# Patient Record
Sex: MALE | Race: WHITE | NOT HISPANIC OR LATINO | Employment: FULL TIME | URBAN - METROPOLITAN AREA
[De-identification: names, ages, dates, MRNs, and addresses within clinical notes are randomized per-mention and may not be internally consistent; named-entity substitution may affect disease eponyms.]

---

## 2018-01-12 NOTE — PROGRESS NOTES
Assessment    1  Well adult exam (V70 0) (Z00 00)   2  Current every day smoker (305 1) (F17 200)   3  Ingrowing nail with infection (703 0) (L60 0)   4  Hyperlipidemia (272 4) (E78 5)   5  Insect bites, initial encounter (919 4,E906 4) (W57 XXXA)   6  Obesity (278 00) (E66 9)    Plan  Abnormal glucose, Hyperlipidemia    · (1) HEMOGLOBIN A1C; Status:Active; Requested for:70Qfi6931;   Hyperlipidemia    · (1) CBC/PLT/DIFF; Status:Active; Requested for:12Dwy0581;    · (1) COMPREHENSIVE METABOLIC PANEL; Status:Active; Requested for:30Atr0984;    · (1) LIPID PANEL, FASTING; Status:Active; Requested for:50Kut9885;    · (1) TSH; Status:Active; Requested for:85Yjs9591;   Ingrowing nail with infection    · Ciclopirox Treatment 8 % External Kit; USE AS DIRECTED  Insect bites, initial encounter    · HydrOXYzine HCl - 25 MG Oral Tablet; 1-2 AT HS FOR ITCHING AND SLEEP  Insomnia    · Zolpidem Tartrate ER 12 5 MG Oral Tablet Extended Release; take 1 tablet at  bedtime for sleep  Need for prophylactic vaccination and inoculation against influenza    · Fluzone Quadrivalent 0 5 ML Intramuscular Suspension Prefilled Syringe  Obesity    · Follow-up visit in 6 weeks Evaluation and Treatment  Follow-up  Status: Hold For -  Scheduling  Requested for: 77QLL9309  SocHx: Tobacco use    · Chantix Starting Month Ivan 0 5 MG X 11 & 1 MG X 42 Oral Tablet; TAKE AS  DIRECTED PER PACKAGE INSTRUCTIONS  Well adult exam    · We recommend you quit smoking  Time spent counseling today was greater than 3  minutes ; Status:Complete;   Done: 91EQD3033    Discussion/Summary  Impression: health maintenance visit, OBESE MALE  Currently, he eats a poor diet and has an inadequate exercise regimen  Prostate cancer screening: PSA is not indicated  Testicular cancer screening: monthly self testicular exam was advised  Colorectal cancer screening: colorectal cancer screening is not indicated  Screening lab work includes glucose and lipid profile   The immunizations are up to date  He was advised to be evaluated by Bridger Sharma  Advice and education were given regarding aerobic exercise, weight loss, cardiovascular risk reduction and tobacco cessation  Patient discussion: discussed with the patient  ENCOURGED TO LOSE WEIGHT AND QUIT SMOKING- REFILL CHANTIX- FOLLOW UP IN 6 WEEKS FOR PROGRESS  HE HAS USEDC CHANTIX 2X AND WELLBUTRIN - NO HELP  DENTAL LOSS- FOLLOW UP WITH DENTIST  MANSI COURTNEY OF CAD AND DM- CHECK LABS AND HGA1C  ADD AMBIEN CR TO HS TO SLEEP- MAY NEED SLEEP EVALUATION; ADD HYDROXIZINE TO HELP W SLEEP AND ITCHING FROM BUG BITES ON LEGS  NAILS- ADD PENLAC TO AREA BID  FLU VACCINE TODAY  Chief Complaint  patient here for annual exam; he feels well; he wants to quit smoking- has used chantix- helped the first time but the second time it did not;   he smokes 1 5 ppd  he has sleep issues; History of Present Illness  HM, Adult Male: The patient is being seen for a health maintenance evaluation  The last health maintenance visit was 12 PLUS month(s) ago  Social History: Work status: working full time  The patient SMOKES 1 5 PPD  He is ready to quit using tobacco  He reports rare alcohol use  He has never used illicit drugs  General Health: The patient's health since the last visit is described as good  He denies vision problems  He denies hearing loss  Immunizations status: up to date  Lifestyle:  He consumes a diverse and healthy diet  He has weight concerns  He does not exercise regularly  He uses tobacco  He denies drug use  Screening:       Sleep Disorders (Brief): The patient is being seen for worsening symptoms of a sleep disorder   Symptoms:  excessive daytime sleepiness, difficulty falling asleep and unrefreshing sleep, but no witnessed sleep apnea, no witnessed sleep gasping, no nocturnal choking, no snoring, no early awakening, no sleepiness when sedentary, no impaired concentration, no memory problems, no irritability and no restless legs  The patient is currently experiencing symptoms  No associated symptoms are reported  Smoking Cessation [facility]: Current treatment includes: varenicline (Chantix)  By report, there is good compliance with treatment  Nail Disease (Brief): The patient is being seen for worsening symptoms of nail disease  Symptoms:  nail discoloration, thickened nails, splitting nails and grooves in the nails, but no brittle nails, no soft nails, no loosened nails, no nail shedding, no bleeding under the nails, no swelling under the nails, no drainage under the nails and no tenderness under the nails  The patient is currently experiencing symptoms  Associated symptoms:  adjacent skin irritation and adjacent skin erythema, but no digital pain, no digital edema, no digital erythema, no digital tenderness, no adjacent skin pruritus and no adjacent skin edema  HPI: PT HAS SLEEP ISSUES- WAS ON 20 MG OF AMBIEN- IN THE PAST- 1 TAB DID NOT WORK WELL; HE WAKES UP THRU THE NIGHT; HE FAILED TRAZODONE;   HE WANTS TO QUIT SMOKING  HE HAS A LESIO NON RIGHT MIDDLE FINGER - A BUBBLE POPS UP AND GETS INFECTED- - HE HAS A HX OF NAIL FUNGUS; HE USED BRUSH ON APPLICATION ON HIS FEET- THEY ARE DOING WELL; HE HAS ITCHING IN GROIN  HE HAS STRONG FAMLY HX OF DM  HE IS DUE FOR LABS       Review of Systems    Constitutional: No fever or chills, feels well, no tiredness, no recent weight gain or weight loss, no fever, not feeling poorly, no chills and not feeling tired  Eyes: No complaints of eye pain, no red eyes, no discharge from eyes, no itchy eyes, no eye pain, no eyesight problems, eyes not red and no purulent discharge from the eyes  ENT: no complaints of earache, no hearing loss, no nosebleeds, no nasal discharge, no sore throat, no hoarseness, no earache, no nosebleeds, no hearing loss and no nasal discharge     Cardiovascular: No complaints of slow heart rate, no fast heart rate, no chest pain, no palpitations, no leg claudication, no lower extremity, the heart rate was not slow, no chest pain, the heart rate was not fast and no palpitations  Respiratory: cough, but No complaints of shortness of breath, no wheezing, no cough, no SOB on exertion, no orthopnea or PND, as noted in HPI, no shortness of breath, no orthopnea, no wheezing, no shortness of breath during exertion and no PND  Gastrointestinal: No complaints of abdominal pain, no constipation, no nausea or vomiting, no diarrhea or bloody stools, no abdominal pain, no nausea, no constipation and no diarrhea  Genitourinary: No complaints of dysuria, no incontinence, no hesitancy, no nocturia, no genital lesion, no testicular pain, no dysuria, no urinary hesitancy, no incontinence and no nocturia  Musculoskeletal: arthralgias and NAIL DEFORMITY RIGHT MIDDLE FINGER; BITES ON LEGS, but as noted in HPI, no joint swelling and no joint stiffness  Integumentary: No complaints of skin rash or skin lesions, no itching, no skin wound, no dry skin  Neurological: No compliants of headache, no confusion, no convulsions, no numbness or tingling, no dizziness or fainting, no limb weakness, no difficulty walking  Psychiatric: Is not suicidal, no sleep disturbances, no anxiety or depression, no change in personality, no emotional problems, no anxiety and no depression  Endocrine: No complaints of proptosis, no hot flashes, no muscle weakness, no erectile dysfunction, no deepening of the voice, no feelings of weakness, no proptosis, no hot flashes and no erectile dysfunction  Hematologic/Lymphatic: No complaints of swollen glands, no swollen glands in the neck, does not bleed easily, no easy bruising, no tendency for easy bleeding and no tendency for easy bruising  ROS reviewed  Active Problems    1  Abscess, dental (522 5) (K04 7)   2  Hyperlipidemia (272 4) (E78 5)   3  Insomnia (780 52) (G47 00)   4  Lyme disease (088 81) (A69 20)   5   Need for prophylactic vaccination and inoculation against influenza (V04 81) (Z23)   6  Obesity (278 00) (E66 9)   7  Onychomycosis (110 1) (B35 1)   8  Pre-op exam (V72 84) (Z01 818)   9  Shingles rash (053 9) (B02 9)   10  Shoulder joint pain, unspecified laterality   11  Tobacco use (305 1) (Z72 0)   12  Well adult exam (V70 0) (Z00 00)    Past Medical History    · History of Cellulitis of left arm (682 3) (L03 114)   · History of Complete tear of rotator cuff, unspecified laterality (727 61) (M75 120)   · History of Foot Pain (Soft Tissue) (729 5)   · History of onychomycosis (V12 09) (Z86 19)   · History of Rotator cuff tendinitis, unspecified laterality (726 10) (M75 80)   · History of Visit for pre-operative examination (V72 84) (M80 096)    Surgical History    · History of Eye Surgery   · History of Foot Repair   · History of Shoulder Surgery    Family History  Father    · Family history of Diabetes Mellitus (V18 0)    Social History    · Current every day smoker (305 1) (F17 200)   · Tobacco use (305 1) (Z72 0)    Current Meds   1  No Reported Medications Recorded    Allergies    1  No Known Drug Allergies    Vitals   Recorded: 33KNW6483 32:73JF   Systolic 078   Diastolic 80   Heart Rate 84   Respiration 16   Temperature 96 F   Height 5 ft 11 in   Weight 278 lb 8 0 oz   BMI Calculated 38 84   BSA Calculated 2 42     Physical Exam    Constitutional   General appearance: No acute distress, well appearing and well nourished  WDWN OBESE MALE IN NAD;    Eyes   Conjunctiva and lids: No erythema, swelling or discharge  EOMI PERRLA  Pupils and irises: Equal, round, reactive to light  Ears, Nose, Mouth, and Throat   External inspection of ears and nose: Normal     Otoscopic examination: Tympanic membranes translucent with normal light reflex  Canals patent without erythema  TM CLEAR  Hearing: Normal     Nasal mucosa, septum, and turbinates: Normal without edema or erythema  Lips, teeth, and gums: Normal, good dentition  Oropharynx: Normal with no erythema, edema, exudate or lesions  POOR DENTITION; CROWDED OROPHARYN X    Neck   Neck: Supple, symmetric, trachea midline, no masses  Thyroid: Normal, no thyromegaly  NO NODULES  Pulmonary   Respiratory effort: No increased work of breathing or signs of respiratory distress  Percussion of chest: Normal     Palpation of chest: Normal     Auscultation of lungs: Clear to auscultation  Auscultation of the lungs revealed no expiratory wheezing, normal expiratory time and no inspiratory wheezing  no rales or crackles were heard bilaterally  no rhonchi  no friction rub  no wheezing  no diminished breath sounds  no bronchial breath sounds  CLEAR WITH DECREASED BS AT BASES NO RALES NO WHEEDZE  Cardiovascular   Palpation of heart: Normal PMI, no thrills  REGULAR  Auscultation of heart: Normal rate and rhythm, normal S1 and S2, no murmurs  The heart rate was normal  The rhythm was regular  Heart sounds: normal S1, normal S2, no S3 and no S4  no murmurs were heard  Carotid pulses: 2+ bilaterally  NO BRUITS NOTED  Abdominal aorta: Normal     Pedal pulses: 2+ bilaterally  PEDAL PULSES INTACT - BONY DEFORMITY LEFT GREAT TOE  Examination of extremities for edema and/or varicosities: Normal     Abdomen   Abdomen: Non-tender, no masses  OBESE NTND NO MASSES NO BRUITS  Liver and spleen: No hepatomegaly or splenomegaly  Lymphatic   Palpation of lymph nodes in neck: No lymphadenopathy  RIGHT MIDDLE DIGIT WITH SCALING NAD IRRITATION AROUND NAIL BED SOME RIDGES NOTED IN NAIL  Musculoskeletal   Gait and station: Normal     Inspection/palpation of digits and nails: Normal without clubbing or cyanosis  Inspection/palpation of joints, bones, and muscles: Normal     Range of motion: Normal     Stability: Normal     Muscle strength/tone: Normal     Skin   Skin and subcutaneous tissue: Normal without rashes or lesions      Palpation of skin and subcutaneous tissue: Abnormal  MULTIPLE BITES ON B/ L LOWER EXTREMITIES  Neurologic   Cranial nerves: Cranial nerves 2-12 intact  Reflexes: 2+ and symmetric  Sensation: No sensory loss  Psychiatric   Judgment and insight: Normal     Orientation to person, place and time: Normal     Recent and remote memory: Intact  Mood and affect: Normal        Results/Data  PHQ-2 Adult Depression Screening 20Sep2016 09:11AM User, [facility]     Test Name Result Flag Reference   PHQ-2 Adult Depression Score 0     Over the last two weeks, how often have you been bothered by any of the following problems? Little interest or pleasure in doing things: Not at all - 0  Feeling down, depressed, or hopeless: Not at all - 0   PHQ-2 Adult Depression Screening Negative         Signatures   Electronically signed by :  Landon Terrell DO; Sep 20 2016  4:03PM EST                       (Author)

## 2018-01-16 NOTE — RESULT NOTES
Message   Farrel Row- Your triglycerides are very high- please add fish oil daily (available over the counter);  quitting smoking, exercising and limiting the fats in your diet will also help;  please repeat labs in 4 months to make sure they improve; Your HgA1c is also 6 3- putting youat risk for diabetes- limiting fats and carbohydrates in your diet will help this as will exercise and weight loss  Follow up after labs are drawn  Verified Results  (1) CBC/PLT/DIFF [id number] 08:22AM [facility] Order Number: [id number]     Test Name Result Flag Reference   WBC COUNT 8 58 Thousand/uL  4 31-10 16   RBC COUNT 5 14 Million/uL  3 88-5 62   HEMOGLOBIN 15 7 g/dL  12 0-17 0   HEMATOCRIT 45 0 %  36 5-49 3   MCV 88 fL  82-98   MCH 30 5 pg  26 8-34 3   MCHC 34 9 g/dL  31 4-37 4   RDW 13 4 %  11 6-15 1   MPV 11 4 fL  8 9-12 7   PLATELET COUNT 980 Thousands/uL  149-390   nRBC AUTOMATED 0 /100 WBCs     NEUTROPHILS RELATIVE PERCENT 58 %  43-75   LYMPHOCYTES RELATIVE PERCENT 27 %  14-44   MONOCYTES RELATIVE PERCENT 9 %  4-12   EOSINOPHILS RELATIVE PERCENT 5 %  0-6   BASOPHILS RELATIVE PERCENT 1 %  0-1   NEUTROPHILS ABSOLUTE COUNT 4 98 Thousands/?L  1 85-7 62   LYMPHOCYTES ABSOLUTE COUNT 2 33 Thousands/?L  0 60-4 47   MONOCYTES ABSOLUTE COUNT 0 81 Thousand/?L  0 17-1 22   EOSINOPHILS ABSOLUTE COUNT 0 39 Thousand/?L  0 00-0 61   BASOPHILS ABSOLUTE COUNT 0 06 Thousands/?L  0 00-0 10   - Patient Instructions: This bloodwork is non-fasting  Please drink two glasses of water morning of bloodwork  - Patient Instructions: This bloodwork is non-fasting  Please drink two glasses of water morning of bloodwork       (1) COMPREHENSIVE METABOLIC PANEL [id number] 92:59AC Kailyn Goncalves Order Number: [id number]     Test Name Result Flag Reference   GLUCOSE,RANDM 112 mg/dL     If the patient is fasting, the ADA then defines impaired fasting glucose as > 100 mg/dL and diabetes as > or equal to 123 mg/dL  SODIUM 132 mmol/L L 136-145   POTASSIUM 4 1 mmol/L  3 5-5 3   CHLORIDE 100 mmol/L  100-108   CARBON DIOXIDE 25 mmol/L  21-32   ANION GAP (CALC) 7 mmol/L  4-13   BLOOD UREA NITROGEN 11 mg/dL  5-25   CREATININE 1 02 mg/dL  0 60-1 30   Standardized to IDMS reference method   CALCIUM 9 0 mg/dL  8 3-10 1   BILI, TOTAL 0 65 mg/dL  0 20-1 00   ALK PHOSPHATAS 58 U/L     ALT (SGPT) 41 U/L  12-78   AST(SGOT) 16 U/L  5-45   ALBUMIN 3 6 g/dL  3 5-5 0   TOTAL PROTEIN 7 7 g/dL  6 4-8 2   eGFR Non-African American      >60 0 ml/min/1 73sq m   - Patient Instructions: This is a fasting blood test  Water,black tea or black  coffee only after 9:00pm the night before test Drink 2 glasses of water the morning of test - Patient Instructions: This bloodwork is non-fasting  Please drink two glasses of   water morning of bloodwork  National Kidney Disease Education Program recommendations are as follows:  GFR calculation is accurate only with a steady state creatinine  Chronic Kidney disease less than 60 ml/min/1 73 sq  meters  Kidney failure less than 15 ml/min/1 73 sq  meters  (1) LIPID PANEL, FASTING [id number] 08:22AM [facility] Order Number: [id number]     Test Name Result Flag Reference   CHOLESTEROL 156 mg/dL     HDL,DIRECT 18 mg/dL L 40-60   Specimen collection should occur prior to Metamizole administration due to the potential for falsely depressed results  LDL CHOLESTEROL CALCULATED 69 mg/dL  0-100   - Patient Instructions: This is a fasting blood test  Water,black tea or black  coffee only after 9:00pm the night before test   Drink 2 glasses of water the morning of test     - Patient Instructions: This is a fasting blood test  Water,black tea or black  coffee only after 9:00pm the night before test Drink 2 glasses of water the morning of test - Patient Instructions: This bloodwork is non-fasting  Please drink two glasses of   water morning of bloodwork  Triglyceride:         Normal              <150 mg/dl       Borderline High    150-199 mg/dl       High               200-499 mg/dl       Very High          >499 mg/dl  Cholesterol:         Desirable        <200 mg/dl      Borderline High  200-239 mg/dl      High             >239 mg/dl  HDL Cholesterol:        High    >59 mg/dL      Low     <41 mg/dL  LDL CALCULATED:    This screening LDL is a calculated result  It does not have the accuracy of the Direct Measured LDL in the monitoring of patients with hyperlipidemia and/or statin therapy  Direct Measure LDL (NIO613) must be ordered separately in these patients  TRIGLYCERIDES 344 mg/dL H <=150   Specimen collection should occur prior to N-Acetylcysteine or Metamizole administration due to the potential for falsely depressed results  (1) TSH 23Sep2016 08:22AM [facility] Number: [id number]     Test Name Result Flag Reference   TSH 1 320 uIU/mL  0 358-3 740   - Patient Instructions: This bloodwork is non-fasting  Please drink two glasses of water morning of bloodwork  - Patient Instructions: This is a fasting blood test  Water,black tea or black  coffee only after 9:00pm the night before test Drink 2 glasses of water the morning of test - Patient Instructions: This bloodwork is non-fasting  Please drink two glasses of   water morning of bloodwork  Patients undergoing fluorescein dye angiography may retain small amounts of fluorescein in the body for 48-72 hours post procedure  Samples containing fluorescein can produce falsely depressed TSH values  If the patient had this procedure,a specimen should be resubmitted post fluorescein clearance  (1) HEMOGLOBIN A1C 23Sep2016 08:22AM [facility] Order Number: [id number]     Test Name Result Flag Reference   HEMOGLOBIN A1C 6 3 %  4 2-6 3   EST  AVG   GLUCOSE 134 mg/dl         Plan  Hyperlipidemia    · (1) COMPREHENSIVE METABOLIC PANEL; Status:Active; Requested BTB:[id number];   Hyperlipidemia, Obesity    · (1) LIPID PANEL, FASTING; Status:Active;  Requested GQE:[id number];

## 2018-05-23 ENCOUNTER — APPOINTMENT (OUTPATIENT)
Dept: LAB | Facility: AMBULARY SURGERY CENTER | Age: 43
End: 2018-05-23
Payer: COMMERCIAL

## 2018-05-23 ENCOUNTER — TRANSCRIBE ORDERS (OUTPATIENT)
Dept: LAB | Facility: AMBULARY SURGERY CENTER | Age: 43
End: 2018-05-23

## 2018-05-23 DIAGNOSIS — D50.0 BLOOD LOSS ANEMIA: ICD-10-CM

## 2018-05-23 DIAGNOSIS — R73.01 IMPAIRED FASTING BLOOD SUGAR: ICD-10-CM

## 2018-05-23 DIAGNOSIS — E78.5 HYPERLIPIDEMIA, UNSPECIFIED HYPERLIPIDEMIA TYPE: Primary | ICD-10-CM

## 2018-05-23 DIAGNOSIS — R35.1 NYCTURIA: ICD-10-CM

## 2018-05-23 DIAGNOSIS — Z11.3 SCREENING FOR VENEREAL DISEASE: ICD-10-CM

## 2018-05-23 DIAGNOSIS — E78.5 HYPERLIPIDEMIA, UNSPECIFIED HYPERLIPIDEMIA TYPE: ICD-10-CM

## 2018-05-23 LAB
ALBUMIN SERPL BCP-MCNC: 3.7 G/DL (ref 3.5–5)
ALP SERPL-CCNC: 55 U/L (ref 46–116)
ALT SERPL W P-5'-P-CCNC: 30 U/L (ref 12–78)
ANION GAP SERPL CALCULATED.3IONS-SCNC: 6 MMOL/L (ref 4–13)
AST SERPL W P-5'-P-CCNC: 12 U/L (ref 5–45)
BASOPHILS # BLD AUTO: 0.07 THOUSANDS/ΜL (ref 0–0.1)
BASOPHILS NFR BLD AUTO: 1 % (ref 0–1)
BILIRUB SERPL-MCNC: 0.64 MG/DL (ref 0.2–1)
BUN SERPL-MCNC: 14 MG/DL (ref 5–25)
CALCIUM SERPL-MCNC: 8.9 MG/DL (ref 8.3–10.1)
CHLAMYDIA DNA CVX QL NAA+PROBE: NORMAL
CHLORIDE SERPL-SCNC: 102 MMOL/L (ref 100–108)
CHOLEST SERPL-MCNC: 175 MG/DL (ref 50–200)
CO2 SERPL-SCNC: 27 MMOL/L (ref 21–32)
CREAT SERPL-MCNC: 1.07 MG/DL (ref 0.6–1.3)
EOSINOPHIL # BLD AUTO: 0.35 THOUSAND/ΜL (ref 0–0.61)
EOSINOPHIL NFR BLD AUTO: 4 % (ref 0–6)
ERYTHROCYTE [DISTWIDTH] IN BLOOD BY AUTOMATED COUNT: 13.3 % (ref 11.6–15.1)
EST. AVERAGE GLUCOSE BLD GHB EST-MCNC: 128 MG/DL
GFR SERPL CREATININE-BSD FRML MDRD: 84 ML/MIN/1.73SQ M
GLUCOSE P FAST SERPL-MCNC: 101 MG/DL (ref 65–99)
HAV IGM SER QL: NORMAL
HBA1C MFR BLD: 6.1 % (ref 4.2–6.3)
HBV CORE IGM SER QL: NORMAL
HBV SURFACE AG SER QL: NORMAL
HCT VFR BLD AUTO: 49.1 % (ref 36.5–49.3)
HCV AB SER QL: NORMAL
HDLC SERPL-MCNC: 24 MG/DL (ref 40–60)
HGB BLD-MCNC: 17 G/DL (ref 12–17)
LDLC SERPL CALC-MCNC: 118 MG/DL (ref 0–100)
LYMPHOCYTES # BLD AUTO: 2.26 THOUSANDS/ΜL (ref 0.6–4.47)
LYMPHOCYTES NFR BLD AUTO: 28 % (ref 14–44)
MCH RBC QN AUTO: 31.5 PG (ref 26.8–34.3)
MCHC RBC AUTO-ENTMCNC: 34.6 G/DL (ref 31.4–37.4)
MCV RBC AUTO: 91 FL (ref 82–98)
MONOCYTES # BLD AUTO: 0.8 THOUSAND/ΜL (ref 0.17–1.22)
MONOCYTES NFR BLD AUTO: 10 % (ref 4–12)
N GONORRHOEA DNA GENITAL QL NAA+PROBE: NORMAL
NEUTROPHILS # BLD AUTO: 4.64 THOUSANDS/ΜL (ref 1.85–7.62)
NEUTS SEG NFR BLD AUTO: 57 % (ref 43–75)
NONHDLC SERPL-MCNC: 151 MG/DL
NRBC BLD AUTO-RTO: 0 /100 WBCS
PLATELET # BLD AUTO: 239 THOUSANDS/UL (ref 149–390)
PMV BLD AUTO: 10.3 FL (ref 8.9–12.7)
POTASSIUM SERPL-SCNC: 4.3 MMOL/L (ref 3.5–5.3)
PROT SERPL-MCNC: 8 G/DL (ref 6.4–8.2)
PSA SERPL-MCNC: 0.8 NG/ML (ref 0–4)
RBC # BLD AUTO: 5.39 MILLION/UL (ref 3.88–5.62)
SODIUM SERPL-SCNC: 135 MMOL/L (ref 136–145)
TRIGL SERPL-MCNC: 164 MG/DL
WBC # BLD AUTO: 8.14 THOUSAND/UL (ref 4.31–10.16)

## 2018-05-23 PROCEDURE — 80074 ACUTE HEPATITIS PANEL: CPT

## 2018-05-23 PROCEDURE — 36415 COLL VENOUS BLD VENIPUNCTURE: CPT

## 2018-05-23 PROCEDURE — 87389 HIV-1 AG W/HIV-1&-2 AB AG IA: CPT

## 2018-05-23 PROCEDURE — 83036 HEMOGLOBIN GLYCOSYLATED A1C: CPT

## 2018-05-23 PROCEDURE — G0103 PSA SCREENING: HCPCS

## 2018-05-23 PROCEDURE — 80061 LIPID PANEL: CPT

## 2018-05-23 PROCEDURE — 87591 N.GONORRHOEAE DNA AMP PROB: CPT

## 2018-05-23 PROCEDURE — 87491 CHLMYD TRACH DNA AMP PROBE: CPT

## 2018-05-23 PROCEDURE — 85025 COMPLETE CBC W/AUTO DIFF WBC: CPT

## 2018-05-23 PROCEDURE — 80053 COMPREHEN METABOLIC PANEL: CPT

## 2018-05-25 LAB — HIV 1+2 AB+HIV1 P24 AG SERPL QL IA: NORMAL

## 2018-06-04 ENCOUNTER — OFFICE VISIT (OUTPATIENT)
Dept: OBGYN CLINIC | Facility: CLINIC | Age: 43
End: 2018-06-04
Payer: COMMERCIAL

## 2018-06-04 ENCOUNTER — TRANSCRIBE ORDERS (OUTPATIENT)
Dept: ADMINISTRATIVE | Facility: HOSPITAL | Age: 43
End: 2018-06-04

## 2018-06-04 VITALS
HEART RATE: 60 BPM | WEIGHT: 257 LBS | HEIGHT: 72 IN | BODY MASS INDEX: 34.81 KG/M2 | DIASTOLIC BLOOD PRESSURE: 78 MMHG | SYSTOLIC BLOOD PRESSURE: 130 MMHG

## 2018-06-04 DIAGNOSIS — R22.32 MASS OF LEFT HAND: Primary | ICD-10-CM

## 2018-06-04 DIAGNOSIS — R22.32 MASS OF FINGER OF LEFT HAND: Primary | ICD-10-CM

## 2018-06-04 PROCEDURE — 99213 OFFICE O/P EST LOW 20 MIN: CPT | Performed by: ORTHOPAEDIC SURGERY

## 2018-06-04 RX ORDER — DIPHENHYDRAMINE HCL 25 MG
25 CAPSULE ORAL EVERY 6 HOURS PRN
COMMUNITY
End: 2019-08-01

## 2018-06-04 RX ORDER — TOBRAMYCIN AND DEXAMETHASONE 3; 1 MG/ML; MG/ML
1 SUSPENSION/ DROPS OPHTHALMIC
COMMUNITY
End: 2019-08-01

## 2018-06-04 RX ORDER — VARENICLINE TARTRATE 25 MG
KIT ORAL
COMMUNITY
Start: 2016-09-20 | End: 2019-08-01

## 2018-06-04 RX ORDER — LORATADINE 10 MG/1
10 TABLET ORAL DAILY
COMMUNITY
End: 2019-08-01

## 2018-06-04 NOTE — PROGRESS NOTES
Assessment:  1  Mass of left hand  US cyst aspiration left    MRI hand left w wo contrast     Patient Active Problem List   Diagnosis    Mass of left hand           Plan      Difficult to assess whether the mass in Tony's left hand is a ganglion cyst versus a neuroma as it is tense to palpation  He will obtain an and [facility] ultrasound with possible aspiration if fluid-filled with radiology  If the mass appears to be dense tissue on the ultrasound and then an MRI with contrast will be ordered to evaluate for neuroma  Possibility of hand surgery referral if Tony's findings are positive for neuroma was discussed with him  Patient was seen and examined and plan for related by Dr Becka Campuzano      Subjective:     Patient ID:    Chief Complaint:Tony Ferrer [age] y o  male      HPI    78-year-old male who is here for evaluation of a mass in his left hand  He noticed a "cyst" in the palm of his left hand approximately one year ago  Initially he did not think much of it but over the past 1-2 months has been giving him significant amount of discomfort with pressure  He denies locking or catching  He denies numbness or tingling  The following portions of the patient's history were reviewed and updated as appropriate: allergies, current medications, past family history, past social history, past surgical history and problem list         Social History     Social History    Marital status: Single     Spouse name: N/A    Number of children: N/A    Years of education: N/A     Occupational History    Not on file  Social History Main Topics    Smoking status: Current Every Day Smoker     Types: Cigarettes    Smokeless tobacco: Never Used    Alcohol use Yes    Drug use: Unknown    Sexual activity: Not on file     Other Topics Concern    Not on file     Social History Narrative    No narrative on file     History reviewed  No pertinent past medical history    Past Surgical History:   Procedure Laterality Date    EYE SURGERY      FOOT SURGERY      HAND SURGERY      SHOULDER SURGERY       Allergies   Allergen Reactions    Adhesive [Medical Tape]      No current outpatient prescriptions on file prior to visit  No current facility-administered medications on file prior to visit  Objective:    Review of Systems   Constitutional: Negative  HENT: Negative  Eyes: Positive for discharge and redness  Respiratory: Negative  Cardiovascular: Negative  Gastrointestinal: Negative  Endocrine: Negative  Genitourinary: Negative  Musculoskeletal: Negative  Skin: Negative  Allergic/Immunologic: Negative  Neurological: Negative  Hematological: Negative  Psychiatric/Behavioral: Negative  Left Knee Exam     Other   Effusion: no effusion present      Left Hand Exam     Tenderness   The patient is experiencing tenderness in the bautista area  Range of Motion   The patient has normal left wrist ROM  Muscle Strength   The patient has normal left wrist strength  Other   Erythema: absent  Scars: absent  Sensation: normal  Pulse: present    Comments:  Mobile dense mass to the palmar surface of left hand between ring and small finger  NO triggering or involvement with A1 pulley  Physical Exam   Constitutional: He is oriented to person, place, and time  He appears well-developed and well-nourished  HENT:   Head: Normocephalic and atraumatic  Eyes: EOM are normal  Left eye exhibits discharge  Neck: Normal range of motion  Cardiovascular: Regular rhythm  Pulmonary/Chest: Effort normal    Musculoskeletal:        Left knee: He exhibits no effusion  Neurological: He is alert and oriented to person, place, and time  Skin: Skin is warm and dry  Psychiatric: He has a normal mood and affect  His behavior is normal  Thought content normal    Nursing note and vitals reviewed              Portions of the record may have been created with voice recognition software   Occasional wrong word or "sound a like" substitutions may have occurred due to the inherent limitations of voice recognition software   Read the chart carefully and recognize, using context, where substitutions have occurred

## 2018-06-07 ENCOUNTER — HOSPITAL ENCOUNTER (OUTPATIENT)
Dept: RADIOLOGY | Facility: HOSPITAL | Age: 43
Discharge: HOME | End: 2018-06-07
Payer: COMMERCIAL

## 2018-06-07 ENCOUNTER — TRANSCRIBE ORDERS (OUTPATIENT)
Dept: RADIOLOGY | Facility: HOSPITAL | Age: 43
End: 2018-06-07

## 2018-06-07 DIAGNOSIS — R22.32 MASS OF FINGER OF LEFT HAND: ICD-10-CM

## 2018-06-07 PROCEDURE — 76881 US COMPL JOINT R-T W/IMG: CPT

## 2018-06-07 PROCEDURE — 20606 DRAIN/INJ JOINT/BURSA W/US: CPT

## 2018-06-07 RX ORDER — LIDOCAINE HYDROCHLORIDE 10 MG/ML
5 INJECTION, SOLUTION EPIDURAL; INFILTRATION; INTRACAUDAL; PERINEURAL ONCE
Status: COMPLETED | OUTPATIENT
Start: 2018-06-07 | End: 2018-06-07

## 2018-06-07 RX ADMIN — LIDOCAINE HYDROCHLORIDE 5 ML: 10 INJECTION, SOLUTION EPIDURAL; INFILTRATION; INTRACAUDAL; PERINEURAL at 08:35

## 2018-12-26 ENCOUNTER — TRANSCRIBE ORDERS (OUTPATIENT)
Dept: ADMINISTRATIVE | Facility: HOSPITAL | Age: 43
End: 2018-12-26

## 2018-12-26 DIAGNOSIS — G56.03 CARPAL TUNNEL SYNDROME, BILATERAL: Primary | ICD-10-CM

## 2019-04-02 ENCOUNTER — HOSPITAL ENCOUNTER (OUTPATIENT)
Dept: NEUROLOGY | Facility: AMBULATORY SURGERY CENTER | Age: 44
Discharge: HOME | End: 2019-04-02
Payer: COMMERCIAL

## 2019-04-02 DIAGNOSIS — G56.03 CARPAL TUNNEL SYNDROME, BILATERAL: ICD-10-CM

## 2019-04-02 PROCEDURE — 95911 NRV CNDJ TEST 9-10 STUDIES: CPT | Performed by: PSYCHIATRY & NEUROLOGY

## 2019-04-02 PROCEDURE — 95886 MUSC TEST DONE W/N TEST COMP: CPT | Performed by: PSYCHIATRY & NEUROLOGY

## 2019-04-08 ENCOUNTER — OFFICE VISIT (OUTPATIENT)
Dept: OBGYN CLINIC | Facility: CLINIC | Age: 44
End: 2019-04-08
Payer: COMMERCIAL

## 2019-04-08 VITALS
HEART RATE: 91 BPM | SYSTOLIC BLOOD PRESSURE: 133 MMHG | BODY MASS INDEX: 36.57 KG/M2 | WEIGHT: 270 LBS | DIASTOLIC BLOOD PRESSURE: 79 MMHG | HEIGHT: 72 IN

## 2019-04-08 DIAGNOSIS — G56.03 CARPAL TUNNEL SYNDROME, BILATERAL: Primary | ICD-10-CM

## 2019-04-08 PROCEDURE — 99213 OFFICE O/P EST LOW 20 MIN: CPT | Performed by: SURGERY

## 2019-05-22 ENCOUNTER — OFFICE VISIT (OUTPATIENT)
Dept: OBGYN CLINIC | Facility: HOSPITAL | Age: 44
End: 2019-05-22
Payer: COMMERCIAL

## 2019-05-22 VITALS
BODY MASS INDEX: 37.11 KG/M2 | SYSTOLIC BLOOD PRESSURE: 138 MMHG | HEIGHT: 72 IN | WEIGHT: 274 LBS | DIASTOLIC BLOOD PRESSURE: 82 MMHG | HEART RATE: 90 BPM

## 2019-05-22 DIAGNOSIS — G56.03 CARPAL TUNNEL SYNDROME, BILATERAL: Primary | ICD-10-CM

## 2019-05-22 PROCEDURE — 99213 OFFICE O/P EST LOW 20 MIN: CPT | Performed by: SURGERY

## 2019-05-22 PROCEDURE — 20526 THER INJECTION CARP TUNNEL: CPT | Performed by: SURGERY

## 2019-05-22 RX ORDER — LIDOCAINE HYDROCHLORIDE 20 MG/ML
1 INJECTION, SOLUTION EPIDURAL; INFILTRATION; INTRACAUDAL; PERINEURAL
Status: COMPLETED | OUTPATIENT
Start: 2019-05-22 | End: 2019-05-22

## 2019-05-22 RX ORDER — DEXAMETHASONE SODIUM PHOSPHATE 100 MG/10ML
40 INJECTION INTRAMUSCULAR; INTRAVENOUS
Status: COMPLETED | OUTPATIENT
Start: 2019-05-22 | End: 2019-05-22

## 2019-05-22 RX ADMIN — LIDOCAINE HYDROCHLORIDE 1 ML: 20 INJECTION, SOLUTION EPIDURAL; INFILTRATION; INTRACAUDAL; PERINEURAL at 09:07

## 2019-05-22 RX ADMIN — DEXAMETHASONE SODIUM PHOSPHATE 40 MG: 100 INJECTION INTRAMUSCULAR; INTRAVENOUS at 09:07

## 2019-07-18 ENCOUNTER — OFFICE VISIT (OUTPATIENT)
Dept: OBGYN CLINIC | Facility: CLINIC | Age: 44
End: 2019-07-18
Payer: COMMERCIAL

## 2019-07-18 VITALS
DIASTOLIC BLOOD PRESSURE: 84 MMHG | BODY MASS INDEX: 37.11 KG/M2 | HEIGHT: 72 IN | WEIGHT: 274 LBS | SYSTOLIC BLOOD PRESSURE: 137 MMHG | HEART RATE: 83 BPM

## 2019-07-18 DIAGNOSIS — G56.02 LEFT CARPAL TUNNEL SYNDROME: ICD-10-CM

## 2019-07-18 DIAGNOSIS — G56.03 CARPAL TUNNEL SYNDROME, BILATERAL: Primary | ICD-10-CM

## 2019-07-18 DIAGNOSIS — M25.512 LEFT SHOULDER PAIN, UNSPECIFIED CHRONICITY: ICD-10-CM

## 2019-07-18 PROCEDURE — 99214 OFFICE O/P EST MOD 30 MIN: CPT | Performed by: SURGERY

## 2019-07-18 RX ORDER — METHYLPREDNISOLONE 4 MG/1
TABLET ORAL
Qty: 1 EACH | Refills: 0 | Status: SHIPPED | OUTPATIENT
Start: 2019-07-18 | End: 2019-08-01

## 2019-07-18 NOTE — PROGRESS NOTES
ASSESSMENT/PLAN:      37 y o  male with bilateral carpal tunnel syndrome, left worse then right  Radha Ling has been wearing bilateral cock up wrist braces at night  He also underwent bilateral carpal tunnel CSI, without significant relief  Left open carpal tunnel release was discussed today  Radha Ling would like to proceed with surgical intervention  Left open carpal tunnel surgical consent was sign in the office today  Follow up 10-14 days afer surgery for suture removal  A EKG and BMP will be performed prior to surgery  He was also instructed to quite smoking  A medrol dosepak was prescribed today for left shoulder pain, no injury or trama  If symptoms persist he may see a shoulder specialist      The patient verbalized understanding of exam findings and treatment plan  We engaged in the shared decision-making process and treatment options were discussed at length with the patient  Surgical and conservative management discussed today along with risks and benefits  Diagnoses and all orders for this visit:    Carpal tunnel syndrome, bilateral      Open Carpal Tunnel Release: The anatomy and physiology of carpal tunnel syndrome was discussed with the patient today  Increase pressure localized under the transverse carpal ligament can cause pain, numbness, tingling, or dysesthesias within the median nerve distribution as well as feelings of fatigue, clumsiness, or awkwardness  These symptoms typically occur at night and worse in the morning upon waking  Eventually, untreated carpal tunnel syndrome can result in weakness and permanent loss of muscle within the thenar compartment of the hand  Treatment options were discussed with the patient  Conservative treatment includes nocturnal resting splints to keep the nerve in a neutral position, ergonomic changes within the work or home environment, activity modification, and tendon gliding exercises    Vitamin B6 one tablet daily over the counter may helpful to reduce symptoms  Steroid injections within the carpal canal can help a majority of patients, however this is often self-limited in a majority of patients  Surgical intervention to divide the transverse carpal ligament typically results in a long-lasting relief of the patient's complaints, with the recurrence rate of less than 1%  The patient has elected to undergo an open carpal tunnel release  The palmar incision technique was discussed in the office with the patient today  In the postoperative period, light activities are allowed immediately, driving is allowed when narcotic medication has stopped, and the bandages may be removed and incision may get wet after 2 days  Heavy activities (lifting more than approximately 10 pounds) will be allowed after follow up appointment in 1-2 weeks  While night symptoms (waking from sleep, pain, and discomfort in the hands) generally improves rapidly, the numbness and tingling as well as the strength will slowly improve over weeks to months depending on the chronicity and severity of the carpal tunnel syndrome  Pillar pain and scar discomfort were discussed with the patient which are self-limiting conditions  The risks of bleeding and infection from the surgery are less than 1%  Risk of recurrence is approximately 0 5%  The risks of nerve injury or nerve damage or damage to the blood vessels is approximately 1 in 1200  The patient has an understanding of the above mentioned discussion  The risks and benefits of the procedure were explained to the patient, which include, but are not limited to: Bleeding, infection, recurrence, pain, scar, damage to tendons, damage to nerves, and damage to blood vessels, failure to give desired results and complications related to anesthesia  These risks, along with alternative conservative treatment options, and postoperative protocols were voiced back and understood by the patient  All questions were answered to the patient's satisfaction  The patient agrees to comply with a standard postoperative protocol, and is willing to proceed  Education was provided via written and auditory forms  There were no barriers to learning  Written handouts regarding wound care, incision and scar care, and general preoperative information was provided to the patient  Prior to surgery, the patient may be requested to stop all anti-inflammatory medications  Prophylactic aspirin, Plavix, and Coumadin may be allowed to be continued  Medications including vitamin E , ginkgo, and fish oil are requested to be stopped approximately one week prior to surgery  Hypertensive medications and beta blockers, if taken, s    Follow Up:  Return 10-14 days after surgery   To Do Next Visit:  Re-evaluation of current issue and Sutures out    ____________________________________________________________________________________________________________________________________________      CHIEF COMPLAINT:  Chief Complaint   Patient presents with    Left Hand - Follow-up    Right Hand - Follow-up       SUBJECTIVE:  Ayah Villalpando is a 37y o  year old RHD male who presents to the office today for a follow up regarding bilateral carpal tunnel syndrome  Doug Nieto has been wearing bilateral cock up wrist braces at night  He received bilateral carpal tunnel CSI on 05/22/19 without significant relief  He would like to discuss surgical intervention today  He notes pain to his right shoulder that radiates down into his thumb since last night while sleeping  He denies any injury or trama  I have personally reviewed all the relevant PMH, PSH, SH, FH, Medications and allergies  PAST MEDICAL HISTORY:  History reviewed  No pertinent past medical history      PAST SURGICAL HISTORY:  Past Surgical History:   Procedure Laterality Date    EYE SURGERY      FOOT SURGERY      HAND SURGERY      SHOULDER SURGERY         FAMILY HISTORY:  Family History   Problem Relation Age of Onset    Diabetes Father     Diabetes Paternal Grandmother        SOCIAL HISTORY:  Social History     Tobacco Use    Smoking status: Current Every Day Smoker     Types: Cigarettes    Smokeless tobacco: Never Used   Substance Use Topics    Alcohol use: Yes    Drug use: Not on file       MEDICATIONS:    Current Outpatient Medications:     diphenhydrAMINE (BENADRYL) 25 mg capsule, Take 25 mg by mouth every 6 (six) hours as needed for itching, Disp: , Rfl:     loratadine (CLARITIN) 10 mg tablet, Take 10 mg by mouth daily, Disp: , Rfl:     tobramycin-dexamethasone (TOBRADEX) ophthalmic suspension, 1 drop every 4 (four) hours while awake, Disp: , Rfl:     varenicline (CHANTIX STARTING MONTH PAK) 0 5 MG X 11 & 1 MG X 42 tablet, Take by mouth, Disp: , Rfl:     ALLERGIES:  Allergies   Allergen Reactions    Adhesive [Medical Tape]        REVIEW OF SYSTEMS:  Review of Systems   Constitutional: Negative for chills, fever and unexpected weight change  HENT: Negative for hearing loss, nosebleeds and sore throat  Eyes: Negative for pain, redness and visual disturbance  Respiratory: Negative for cough, shortness of breath and wheezing  Cardiovascular: Negative for chest pain, palpitations and leg swelling  Gastrointestinal: Negative for abdominal pain, nausea and vomiting  Endocrine: Negative for polydipsia and polyuria  Genitourinary: Negative for difficulty urinating and hematuria  Musculoskeletal: Negative for arthralgias, joint swelling and myalgias  Skin: Negative for rash and wound  Neurological: Positive for numbness  Negative for dizziness and headaches  Psychiatric/Behavioral: Negative for decreased concentration, dysphoric mood and suicidal ideas  The patient is not nervous/anxious          VITALS:  Vitals:    07/18/19 1538   BP: 137/84   Pulse: 83       LABS:  HgA1c:   Lab Results   Component Value Date    HGBA1C 6 1 05/23/2018     BMP:   Lab Results   Component Value Date    GLUCOSE 101 08/13/2014    CALCIUM 8 9 05/23/2018     (L) 08/13/2014    K 4 3 05/23/2018    CO2 27 05/23/2018     05/23/2018    BUN 14 05/23/2018    CREATININE 1 07 05/23/2018       _____________________________________________________  PHYSICAL EXAMINATION:  General: well developed and well nourished, alert, oriented times 3 and appears comfortable  Psychiatric: Normal  HEENT: Normocephalic, Atraumatic Trachea Midline, No torticollis  Pulmonary: No audible wheezing or respiratory distress   Cardiovascular: No pitting edema, 2+ radial pulse   Skin: No masses, erythema, lacerations, fluctation, ulcerations  Neurovascular: Sensation intact to the Ulnar Nerve, Sensation Intact to the Radial Nerve, Motor Intact to the Ulnar Nerve, Motor Intact to the Radial Nerve and Pulses Intact  Musculoskeletal: Normal, except as noted in detailed exam and in HPI  MUSCULOSKELETAL EXAMINATION:    Right Carpal Tunnel Exam:  Negative thenar atrophy  Negative phalen's test  Negative carpal tunnel compression  Positive tinels over median nerve at the wrist      Left Carpal Tunnel Exam:  Negative thenar atrophy  Negative phalen's test  Negative carpal tunnel compression  Positive tinels over median nerve at the wrist       ___________________________________________________  STUDIES REVIEWED:  I have personally reviewed bilateral upper extremity EMG's which demonstrates bilateral moderate carpal tunnel syndrome    Right worse than left no motor changes noted          PROCEDURES PERFORMED:  Procedures  No Procedures performed today    _____________________________________________________      Rajesh Loveless    I,:   Naren Beckett am acting as a scribe while in the presence of the attending physician :        I,:   Antonia Shane MD personally performed the services described in this documentation    as scribed in my presence :

## 2019-07-18 NOTE — H&P
ASSESSMENT/PLAN:      37 y o  male with bilateral carpal tunnel syndrome, left worse then right  Jenn Michelle has been wearing bilateral cock up wrist braces at night  He also underwent bilateral carpal tunnel CSI, without significant relief  Left open carpal tunnel release was discussed today  Jenn Michelle would like to proceed with surgical intervention  Left open carpal tunnel surgical consent was sign in the office today  Follow up 10-14 days afer surgery for suture removal  A EKG and BMP will be performed prior to surgery  He was also instructed to quite smoking  A medrol dosepak was prescribed today for left shoulder pain, no injury or trama  If symptoms persist he may see a shoulder specialist      The patient verbalized understanding of exam findings and treatment plan  We engaged in the shared decision-making process and treatment options were discussed at length with the patient  Surgical and conservative management discussed today along with risks and benefits  Diagnoses and all orders for this visit:    Carpal tunnel syndrome, bilateral      Open Carpal Tunnel Release: The anatomy and physiology of carpal tunnel syndrome was discussed with the patient today  Increase pressure localized under the transverse carpal ligament can cause pain, numbness, tingling, or dysesthesias within the median nerve distribution as well as feelings of fatigue, clumsiness, or awkwardness  These symptoms typically occur at night and worse in the morning upon waking  Eventually, untreated carpal tunnel syndrome can result in weakness and permanent loss of muscle within the thenar compartment of the hand  Treatment options were discussed with the patient  Conservative treatment includes nocturnal resting splints to keep the nerve in a neutral position, ergonomic changes within the work or home environment, activity modification, and tendon gliding exercises    Vitamin B6 one tablet daily over the counter may helpful to reduce symptoms  Steroid injections within the carpal canal can help a majority of patients, however this is often self-limited in a majority of patients  Surgical intervention to divide the transverse carpal ligament typically results in a long-lasting relief of the patient's complaints, with the recurrence rate of less than 1%  The patient has elected to undergo an open carpal tunnel release  The palmar incision technique was discussed in the office with the patient today  In the postoperative period, light activities are allowed immediately, driving is allowed when narcotic medication has stopped, and the bandages may be removed and incision may get wet after 2 days  Heavy activities (lifting more than approximately 10 pounds) will be allowed after follow up appointment in 1-2 weeks  While night symptoms (waking from sleep, pain, and discomfort in the hands) generally improves rapidly, the numbness and tingling as well as the strength will slowly improve over weeks to months depending on the chronicity and severity of the carpal tunnel syndrome  Pillar pain and scar discomfort were discussed with the patient which are self-limiting conditions  The risks of bleeding and infection from the surgery are less than 1%  Risk of recurrence is approximately 0 5%  The risks of nerve injury or nerve damage or damage to the blood vessels is approximately 1 in 1200  The patient has an understanding of the above mentioned discussion  The risks and benefits of the procedure were explained to the patient, which include, but are not limited to: Bleeding, infection, recurrence, pain, scar, damage to tendons, damage to nerves, and damage to blood vessels, failure to give desired results and complications related to anesthesia  These risks, along with alternative conservative treatment options, and postoperative protocols were voiced back and understood by the patient  All questions were answered to the patient's satisfaction  The patient agrees to comply with a standard postoperative protocol, and is willing to proceed  Education was provided via written and auditory forms  There were no barriers to learning  Written handouts regarding wound care, incision and scar care, and general preoperative information was provided to the patient  Prior to surgery, the patient may be requested to stop all anti-inflammatory medications  Prophylactic aspirin, Plavix, and Coumadin may be allowed to be continued  Medications including vitamin E , ginkgo, and fish oil are requested to be stopped approximately one week prior to surgery  Hypertensive medications and beta blockers, if taken, s    Follow Up:  Return 10-14 days after surgery   To Do Next Visit:  Re-evaluation of current issue and Sutures out    ____________________________________________________________________________________________________________________________________________      CHIEF COMPLAINT:  Chief Complaint   Patient presents with    Left Hand - Follow-up    Right Hand - Follow-up       SUBJECTIVE:  Chrissy Huynh is a 37y o  year old RHD male who presents to the office today for a follow up regarding bilateral carpal tunnel syndrome  Hermila Morgan has been wearing bilateral cock up wrist braces at night  He received bilateral carpal tunnel CSI on 05/22/19 without significant relief  He would like to discuss surgical intervention today  He notes pain to his right shoulder that radiates down into his thumb since last night while sleeping  He denies any injury or trama  I have personally reviewed all the relevant PMH, PSH, SH, FH, Medications and allergies  PAST MEDICAL HISTORY:  History reviewed  No pertinent past medical history      PAST SURGICAL HISTORY:  Past Surgical History:   Procedure Laterality Date    EYE SURGERY      FOOT SURGERY      HAND SURGERY      SHOULDER SURGERY         FAMILY HISTORY:  Family History   Problem Relation Age of Onset    Diabetes Father     Diabetes Paternal Grandmother        SOCIAL HISTORY:  Social History     Tobacco Use    Smoking status: Current Every Day Smoker     Types: Cigarettes    Smokeless tobacco: Never Used   Substance Use Topics    Alcohol use: Yes    Drug use: Not on file       MEDICATIONS:    Current Outpatient Medications:     diphenhydrAMINE (BENADRYL) 25 mg capsule, Take 25 mg by mouth every 6 (six) hours as needed for itching, Disp: , Rfl:     loratadine (CLARITIN) 10 mg tablet, Take 10 mg by mouth daily, Disp: , Rfl:     tobramycin-dexamethasone (TOBRADEX) ophthalmic suspension, 1 drop every 4 (four) hours while awake, Disp: , Rfl:     varenicline (CHANTIX STARTING MONTH PAK) 0 5 MG X 11 & 1 MG X 42 tablet, Take by mouth, Disp: , Rfl:     ALLERGIES:  Allergies   Allergen Reactions    Adhesive [Medical Tape]        REVIEW OF SYSTEMS:  Review of Systems   Constitutional: Negative for chills, fever and unexpected weight change  HENT: Negative for hearing loss, nosebleeds and sore throat  Eyes: Negative for pain, redness and visual disturbance  Respiratory: Negative for cough, shortness of breath and wheezing  Cardiovascular: Negative for chest pain, palpitations and leg swelling  Gastrointestinal: Negative for abdominal pain, nausea and vomiting  Endocrine: Negative for polydipsia and polyuria  Genitourinary: Negative for difficulty urinating and hematuria  Musculoskeletal: Negative for arthralgias, joint swelling and myalgias  Skin: Negative for rash and wound  Neurological: Positive for numbness  Negative for dizziness and headaches  Psychiatric/Behavioral: Negative for decreased concentration, dysphoric mood and suicidal ideas  The patient is not nervous/anxious          VITALS:  Vitals:    07/18/19 1538   BP: 137/84   Pulse: 83       LABS:  HgA1c:   Lab Results   Component Value Date    HGBA1C 6 1 05/23/2018     BMP:   Lab Results   Component Value Date    GLUCOSE 101 08/13/2014    CALCIUM 8 9 05/23/2018     (L) 08/13/2014    K 4 3 05/23/2018    CO2 27 05/23/2018     05/23/2018    BUN 14 05/23/2018    CREATININE 1 07 05/23/2018       _____________________________________________________  PHYSICAL EXAMINATION:  General: well developed and well nourished, alert, oriented times 3 and appears comfortable  Psychiatric: Normal  HEENT: Normocephalic, Atraumatic Trachea Midline, No torticollis  Pulmonary: No audible wheezing or respiratory distress   Cardiovascular: No pitting edema, 2+ radial pulse   Skin: No masses, erythema, lacerations, fluctation, ulcerations  Neurovascular: Sensation intact to the Ulnar Nerve, Sensation Intact to the Radial Nerve, Motor Intact to the Ulnar Nerve, Motor Intact to the Radial Nerve and Pulses Intact  Musculoskeletal: Normal, except as noted in detailed exam and in HPI  MUSCULOSKELETAL EXAMINATION:    Right Carpal Tunnel Exam:  Negative thenar atrophy  Negative phalen's test  Negative carpal tunnel compression  Positive tinels over median nerve at the wrist      Left Carpal Tunnel Exam:  Negative thenar atrophy  Negative phalen's test  Negative carpal tunnel compression  Positive tinels over median nerve at the wrist       ___________________________________________________  STUDIES REVIEWED:  I have personally reviewed bilateral upper extremity EMG's which demonstrates bilateral moderate carpal tunnel syndrome    Right worse than left no motor changes noted          PROCEDURES PERFORMED:  Procedures  No Procedures performed today    _____________________________________________________      Lisa Hooker    I,:   Susan Beckett am acting as a scribe while in the presence of the attending physician :        I,:   Miladys Her MD personally performed the services described in this documentation    as scribed in my presence :

## 2019-08-01 RX ORDER — COVID-19 ANTIGEN TEST
220 KIT MISCELLANEOUS AS NEEDED
COMMUNITY
End: 2020-01-20

## 2019-08-01 NOTE — PRE-PROCEDURE INSTRUCTIONS
Pre-Surgery Instructions:   Medication Instructions    Naproxen Sodium (ALEVE) 220 MG CAPS Patient was instructed by Physician and understands  Pre op and bathing instructions reviewed   Pt will get hibiclens

## 2019-08-06 ENCOUNTER — OFFICE VISIT (OUTPATIENT)
Dept: LAB | Facility: CLINIC | Age: 44
End: 2019-08-06
Payer: COMMERCIAL

## 2019-08-06 ENCOUNTER — APPOINTMENT (OUTPATIENT)
Dept: LAB | Facility: CLINIC | Age: 44
End: 2019-08-06
Payer: COMMERCIAL

## 2019-08-06 DIAGNOSIS — G56.02 LEFT CARPAL TUNNEL SYNDROME: ICD-10-CM

## 2019-08-06 LAB
ANION GAP SERPL CALCULATED.3IONS-SCNC: 11 MMOL/L (ref 4–13)
BUN SERPL-MCNC: 9 MG/DL (ref 5–25)
CALCIUM SERPL-MCNC: 9.4 MG/DL (ref 8.3–10.1)
CHLORIDE SERPL-SCNC: 103 MMOL/L (ref 100–108)
CO2 SERPL-SCNC: 25 MMOL/L (ref 21–32)
CREAT SERPL-MCNC: 1.07 MG/DL (ref 0.6–1.3)
GFR SERPL CREATININE-BSD FRML MDRD: 83 ML/MIN/1.73SQ M
GLUCOSE SERPL-MCNC: 136 MG/DL (ref 65–140)
POTASSIUM SERPL-SCNC: 4 MMOL/L (ref 3.5–5.3)
SODIUM SERPL-SCNC: 139 MMOL/L (ref 136–145)

## 2019-08-06 PROCEDURE — 93005 ELECTROCARDIOGRAM TRACING: CPT

## 2019-08-06 PROCEDURE — 80048 BASIC METABOLIC PNL TOTAL CA: CPT

## 2019-08-06 PROCEDURE — 36415 COLL VENOUS BLD VENIPUNCTURE: CPT

## 2019-08-08 LAB
ATRIAL RATE: 73 BPM
P AXIS: 55 DEGREES
PR INTERVAL: 204 MS
QRS AXIS: 24 DEGREES
QRSD INTERVAL: 110 MS
QT INTERVAL: 378 MS
QTC INTERVAL: 416 MS
T WAVE AXIS: 37 DEGREES
VENTRICULAR RATE: 73 BPM

## 2019-08-08 PROCEDURE — 93010 ELECTROCARDIOGRAM REPORT: CPT | Performed by: INTERNAL MEDICINE

## 2019-08-09 ENCOUNTER — ANESTHESIA EVENT (OUTPATIENT)
Dept: PERIOP | Facility: AMBULARY SURGERY CENTER | Age: 44
End: 2019-08-09
Payer: COMMERCIAL

## 2019-08-20 ENCOUNTER — ANESTHESIA (OUTPATIENT)
Dept: PERIOP | Facility: AMBULARY SURGERY CENTER | Age: 44
End: 2019-08-20
Payer: COMMERCIAL

## 2019-08-20 ENCOUNTER — HOSPITAL ENCOUNTER (OUTPATIENT)
Facility: AMBULARY SURGERY CENTER | Age: 44
Setting detail: OUTPATIENT SURGERY
Discharge: HOME | End: 2019-08-20
Attending: SURGERY | Admitting: SURGERY
Payer: COMMERCIAL

## 2019-08-20 VITALS
RESPIRATION RATE: 18 BRPM | TEMPERATURE: 97.3 F | HEART RATE: 63 BPM | OXYGEN SATURATION: 94 % | DIASTOLIC BLOOD PRESSURE: 67 MMHG | BODY MASS INDEX: 36.57 KG/M2 | SYSTOLIC BLOOD PRESSURE: 111 MMHG | HEIGHT: 72 IN | WEIGHT: 270 LBS

## 2019-08-20 DIAGNOSIS — Z47.89 AFTERCARE FOLLOWING SURGERY OF THE MUSCULOSKELETAL SYSTEM: ICD-10-CM

## 2019-08-20 DIAGNOSIS — G56.02 LEFT CARPAL TUNNEL SYNDROME: Primary | ICD-10-CM

## 2019-08-20 PROCEDURE — 99024 POSTOP FOLLOW-UP VISIT: CPT | Performed by: SURGERY

## 2019-08-20 PROCEDURE — 64721 CARPAL TUNNEL SURGERY: CPT | Performed by: PHYSICIAN ASSISTANT

## 2019-08-20 PROCEDURE — 64721 CARPAL TUNNEL SURGERY: CPT | Performed by: SURGERY

## 2019-08-20 RX ORDER — FENTANYL CITRATE 50 UG/ML
INJECTION, SOLUTION INTRAMUSCULAR; INTRAVENOUS AS NEEDED
Status: DISCONTINUED | OUTPATIENT
Start: 2019-08-20 | End: 2019-08-20 | Stop reason: SURG

## 2019-08-20 RX ORDER — LIDOCAINE HYDROCHLORIDE 10 MG/ML
INJECTION, SOLUTION INFILTRATION; PERINEURAL AS NEEDED
Status: DISCONTINUED | OUTPATIENT
Start: 2019-08-20 | End: 2019-08-20 | Stop reason: SURG

## 2019-08-20 RX ORDER — HYDROCODONE BITARTRATE AND ACETAMINOPHEN 5; 325 MG/1; MG/1
1 TABLET ORAL EVERY 6 HOURS PRN
Qty: 5 TABLET | Refills: 0 | Status: SHIPPED | OUTPATIENT
Start: 2019-08-20 | End: 2019-08-30

## 2019-08-20 RX ORDER — ONDANSETRON 2 MG/ML
INJECTION INTRAMUSCULAR; INTRAVENOUS AS NEEDED
Status: DISCONTINUED | OUTPATIENT
Start: 2019-08-20 | End: 2019-08-20 | Stop reason: SURG

## 2019-08-20 RX ORDER — MIDAZOLAM HYDROCHLORIDE 1 MG/ML
INJECTION INTRAMUSCULAR; INTRAVENOUS AS NEEDED
Status: DISCONTINUED | OUTPATIENT
Start: 2019-08-20 | End: 2019-08-20 | Stop reason: SURG

## 2019-08-20 RX ORDER — CEFAZOLIN SODIUM 1 G/50ML
1000 SOLUTION INTRAVENOUS ONCE
Status: COMPLETED | OUTPATIENT
Start: 2019-08-20 | End: 2019-08-20

## 2019-08-20 RX ORDER — FENTANYL CITRATE/PF 50 MCG/ML
25 SYRINGE (ML) INJECTION
Status: DISCONTINUED | OUTPATIENT
Start: 2019-08-20 | End: 2019-08-20 | Stop reason: HOSPADM

## 2019-08-20 RX ORDER — ONDANSETRON 2 MG/ML
4 INJECTION INTRAMUSCULAR; INTRAVENOUS EVERY 6 HOURS PRN
Status: DISCONTINUED | OUTPATIENT
Start: 2019-08-20 | End: 2019-08-20 | Stop reason: HOSPADM

## 2019-08-20 RX ORDER — ACETAMINOPHEN 325 MG/1
650 TABLET ORAL EVERY 6 HOURS PRN
Status: DISCONTINUED | OUTPATIENT
Start: 2019-08-20 | End: 2019-08-20 | Stop reason: HOSPADM

## 2019-08-20 RX ORDER — PROPOFOL 10 MG/ML
INJECTION, EMULSION INTRAVENOUS AS NEEDED
Status: DISCONTINUED | OUTPATIENT
Start: 2019-08-20 | End: 2019-08-20 | Stop reason: SURG

## 2019-08-20 RX ORDER — PROPOFOL 10 MG/ML
INJECTION, EMULSION INTRAVENOUS CONTINUOUS PRN
Status: DISCONTINUED | OUTPATIENT
Start: 2019-08-20 | End: 2019-08-20 | Stop reason: SURG

## 2019-08-20 RX ORDER — SODIUM CHLORIDE, SODIUM LACTATE, POTASSIUM CHLORIDE, CALCIUM CHLORIDE 600; 310; 30; 20 MG/100ML; MG/100ML; MG/100ML; MG/100ML
125 INJECTION, SOLUTION INTRAVENOUS CONTINUOUS
Status: DISCONTINUED | OUTPATIENT
Start: 2019-08-20 | End: 2019-08-20 | Stop reason: HOSPADM

## 2019-08-20 RX ORDER — MAGNESIUM HYDROXIDE 1200 MG/15ML
LIQUID ORAL AS NEEDED
Status: DISCONTINUED | OUTPATIENT
Start: 2019-08-20 | End: 2019-08-20 | Stop reason: HOSPADM

## 2019-08-20 RX ORDER — CEFAZOLIN SODIUM 2 G/50ML
2000 SOLUTION INTRAVENOUS ONCE
Status: COMPLETED | OUTPATIENT
Start: 2019-08-20 | End: 2019-08-20

## 2019-08-20 RX ADMIN — FENTANYL CITRATE 50 MCG: 50 INJECTION, SOLUTION INTRAMUSCULAR; INTRAVENOUS at 08:40

## 2019-08-20 RX ADMIN — CEFAZOLIN SODIUM 1000 MG: 1 SOLUTION INTRAVENOUS at 08:28

## 2019-08-20 RX ADMIN — LIDOCAINE HYDROCHLORIDE 50 MG: 10 INJECTION, SOLUTION INFILTRATION; PERINEURAL at 08:29

## 2019-08-20 RX ADMIN — PROPOFOL 150 MG: 10 INJECTION, EMULSION INTRAVENOUS at 08:35

## 2019-08-20 RX ADMIN — PROPOFOL 120 MCG/KG/MIN: 10 INJECTION, EMULSION INTRAVENOUS at 08:35

## 2019-08-20 RX ADMIN — MIDAZOLAM HYDROCHLORIDE 2 MG: 1 INJECTION, SOLUTION INTRAMUSCULAR; INTRAVENOUS at 08:29

## 2019-08-20 RX ADMIN — ONDANSETRON 4 MG: 2 INJECTION INTRAMUSCULAR; INTRAVENOUS at 08:40

## 2019-08-20 RX ADMIN — CEFAZOLIN SODIUM 2000 MG: 2 SOLUTION INTRAVENOUS at 08:28

## 2019-08-20 RX ADMIN — FENTANYL CITRATE 50 MCG: 50 INJECTION, SOLUTION INTRAMUSCULAR; INTRAVENOUS at 08:29

## 2019-08-20 RX ADMIN — SODIUM CHLORIDE, SODIUM LACTATE, POTASSIUM CHLORIDE, AND CALCIUM CHLORIDE: .6; .31; .03; .02 INJECTION, SOLUTION INTRAVENOUS at 08:29

## 2019-08-20 NOTE — ANESTHESIA POSTPROCEDURE EVALUATION
Post-Op Assessment Note    CV Status:  Stable    Pain management: adequate     Mental Status:  Alert and awake   Hydration Status:  Euvolemic   PONV Controlled:  Controlled   Airway Patency:  Patent   Post Op Vitals Reviewed: Yes      Staff: CRNA           BP   119/65   Temp     Pulse  81   Resp   18   SpO2   92

## 2019-08-20 NOTE — OP NOTE
OPERATIVE REPORT  PATIENT NAME: Eren Byrd  :  1975  MRN: [id number]  Pt Location: [facility]    SURGERY DATE: 19    Surgeon(s) and Role:     * Shira Montilla MD - Primary     * Kim Tafoya PA-C - Assisting    Pre-Op Diagnosis:  Left carpal tunnel syndrome [G56 02]    Post-Op Diagnosis Codes:     * Left carpal tunnel syndrome [G56 02]    Procedure(s):  CARPAL TUNNEL RELEASE (Left)    Specimen(s):  * No orders in the log *    Estimated Blood Loss:   Minimal    Anesthesia Type:   Conscious Sedation     IMPLANTS:  * No implants in log *    PERIOPERATIVE ANTIBIOTICS:    cefazolin, 3 grams    Tourniquet Time: 4 min at 250 mmHg          Operative Indications: The patient has a history of Carpal Tunnel Syndrome  left that was recalcitrant to conservative management  The decision was made to bring the patient to the operating room for Open Carpal Tunnel Release  left  Risks of the procedure were explained which include, but are not limited to bleeding; infection; damage to nerves, arteries,veins, tendons; scar; pain; need for reoperation; failure to give desired result; and risks of anaesthesia  All questions were answered to satisfaction and they were willing to proceed  Operative Findings:  Significant hypertrophy of TCL       Complications:   None    Procedure and Technique:  After the patient, site, and procedure were identified, the patient was brought into the operating room in a supine position  Local anaesthesia and sedation were provided  A well padded tourniquet was applied to the extremity, set at 250 mmHg  The  left upper extremity was then prepped and drapped in a normal, sterile, orthopedic fashion  An anterior approach to the carpal tunnel was undertaken  A 3 cm incision was made in line with the fourth digit, proximal to Castillo's line  The skin and the subcutaneous tissue were sharply incised    Under loupe magnification, dissection was carried down to the palmar fascia and it was incised  The transverse carpal ligament was visualized and  Released distally with a #64 blade  A freer was was passed above and below the TCL in a retrograde fashion, freeing up any adhesions  A Knife Lite was used to release the proximal portion of the transverse carpal ligament under direct visualization  Distally the superficial arch was identified  A complete release was carried out and exploration of the carpal canal revealed no significant tenosynovitis  The median nerve and its branches were intact  At the completion of the procedure, hemostasis was obtained with cautery and direct pressure  The wounds were copiously irrigated with sterile solution  The wounds were closed with Prolene  Sterile dressings were applied, including Xeroform, gauze, tweeners, webril, ACE  Please note, all sponge, needle, and instrument counts were correct prior to closure  Loupe magnification was utilized  The patient tolerated the procedure well       I was present for the entire procedure, A qualified resident physician was not available and A physician assistant was required during the procedure for retraction tissue handling,dissection and suturing    Patient Disposition:  PACU     SIGNATURE: Antonia Shane MD  DATE: 08/20/19  TIME: 8:55 AM

## 2019-08-20 NOTE — INTERVAL H&P NOTE
H&P reviewed  After examining the patient I find no changes in the patients condition since the H&P had been written      Vitals:    08/20/19 0744   BP: 136/81   Pulse: 76   Resp: 18   Temp: (!) 97 3 °F (36 3 °C)   SpO2: 95%

## 2019-08-20 NOTE — H&P
ASSESSMENT/PLAN:       37 y o  male with bilateral carpal tunnel syndrome, left worse then right  James Rachel has been wearing bilateral cock up wrist braces at night  He also underwent bilateral carpal tunnel CSI, without significant relief  Left open carpal tunnel release was discussed today  James Rachel would like to proceed with surgical intervention  Left open carpal tunnel surgical consent was sign in the office today  Follow up 10-14 days afer surgery for suture removal  A EKG and BMP will be performed prior to surgery  He was also instructed to quite smoking  A medrol dosepak was prescribed today for left shoulder pain, no injury or trama  If symptoms persist he may see a shoulder specialist       The patient verbalized understanding of exam findings and treatment plan  We engaged in the shared decision-making process and treatment options were discussed at length with the patient  Surgical and conservative management discussed today along with risks and benefits      Diagnoses and all orders for this visit:     Carpal tunnel syndrome, bilateral       Open Carpal Tunnel Release: The anatomy and physiology of carpal tunnel syndrome was discussed with the patient today  Increase pressure localized under the transverse carpal ligament can cause pain, numbness, tingling, or dysesthesias within the median nerve distribution as well as feelings of fatigue, clumsiness, or awkwardness  These symptoms typically occur at night and worse in the morning upon waking  Eventually, untreated carpal tunnel syndrome can result in weakness and permanent loss of muscle within the thenar compartment of the hand  Treatment options were discussed with the patient  Conservative treatment includes nocturnal resting splints to keep the nerve in a neutral position, ergonomic changes within the work or home environment, activity modification, and tendon gliding exercises    Vitamin B6 one tablet daily over the counter may helpful to reduce symptoms  Steroid injections within the carpal canal can help a majority of patients, however this is often self-limited in a majority of patients  Surgical intervention to divide the transverse carpal ligament typically results in a long-lasting relief of the patient's complaints, with the recurrence rate of less than 1%  The patient has elected to undergo an open carpal tunnel release  The palmar incision technique was discussed in the office with the patient today  In the postoperative period, light activities are allowed immediately, driving is allowed when narcotic medication has stopped, and the bandages may be removed and incision may get wet after 2 days  Heavy activities (lifting more than approximately 10 pounds) will be allowed after follow up appointment in 1-2 weeks  While night symptoms (waking from sleep, pain, and discomfort in the hands) generally improves rapidly, the numbness and tingling as well as the strength will slowly improve over weeks to months depending on the chronicity and severity of the carpal tunnel syndrome  Pillar pain and scar discomfort were discussed with the patient which are self-limiting conditions  The risks of bleeding and infection from the surgery are less than 1%  Risk of recurrence is approximately 0 5%  The risks of nerve injury or nerve damage or damage to the blood vessels is approximately 1 in 1200  The patient has an understanding of the above mentioned discussion  The risks and benefits of the procedure were explained to the patient, which include, but are not limited to: Bleeding, infection, recurrence, pain, scar, damage to tendons, damage to nerves, and damage to blood vessels, failure to give desired results and complications related to anesthesia  These risks, along with alternative conservative treatment options, and postoperative protocols were voiced back and understood by the patient    All questions were answered to the patient's satisfaction  The patient agrees to comply with a standard postoperative protocol, and is willing to proceed  Education was provided via written and auditory forms  There were no barriers to learning  Written handouts regarding wound care, incision and scar care, and general preoperative information was provided to the patient  Prior to surgery, the patient may be requested to stop all anti-inflammatory medications  Prophylactic aspirin, Plavix, and Coumadin may be allowed to be continued  Medications including vitamin E , ginkgo, and fish oil are requested to be stopped approximately one week prior to surgery  Hypertensive medications and beta blockers, if taken, s     Follow Up:  Return 10-14 days after surgery          To Do Next Visit:  Re-evaluation of current issue and Sutures out     ____________________________________________________________________________________________________________________________________________        CHIEF COMPLAINT:      Chief Complaint   Patient presents with    Left Hand - Follow-up    Right Hand - Follow-up         SUBJECTIVE:  Tsering Hernandez is a 37y o  year old RHD male who presents to the office today for a follow up regarding bilateral carpal tunnel syndrome  Dulce Maria Nuñez has been wearing bilateral cock up wrist braces at night  He received bilateral carpal tunnel CSI on 05/22/19 without significant relief  He would like to discuss surgical intervention today  He notes pain to his right shoulder that radiates down into his thumb since last night while sleeping  He denies any injury or trama          I have personally reviewed all the relevant PMH, PSH, SH, FH, Medications and allergies       PAST MEDICAL HISTORY:  History reviewed   No pertinent past medical history      PAST SURGICAL HISTORY:        Past Surgical History:   Procedure Laterality Date    EYE SURGERY        FOOT SURGERY        HAND SURGERY        SHOULDER SURGERY             FAMILY HISTORY: Family History   Problem Relation Age of Onset    Diabetes Father      Diabetes Paternal Grandmother           SOCIAL HISTORY:  Social History            Tobacco Use    Smoking status: Current Every Day Smoker       Types: Cigarettes    Smokeless tobacco: Never Used   Substance Use Topics    Alcohol use: Yes    Drug use: Not on file         MEDICATIONS:     Current Outpatient Medications:     diphenhydrAMINE (BENADRYL) 25 mg capsule, Take 25 mg by mouth every 6 (six) hours as needed for itching, Disp: , Rfl:     loratadine (CLARITIN) 10 mg tablet, Take 10 mg by mouth daily, Disp: , Rfl:     tobramycin-dexamethasone (TOBRADEX) ophthalmic suspension, 1 drop every 4 (four) hours while awake, Disp: , Rfl:     varenicline (CHANTIX STARTING MONTH PAK) 0 5 MG X 11 & 1 MG X 42 tablet, Take by mouth, Disp: , Rfl:      ALLERGIES:  Allergies   Allergen Reactions    Adhesive [Medical Tape]           REVIEW OF SYSTEMS:  Review of Systems   Constitutional: Negative for chills, fever and unexpected weight change  HENT: Negative for hearing loss, nosebleeds and sore throat  Eyes: Negative for pain, redness and visual disturbance  Respiratory: Negative for cough, shortness of breath and wheezing  Cardiovascular: Negative for chest pain, palpitations and leg swelling  Gastrointestinal: Negative for abdominal pain, nausea and vomiting  Endocrine: Negative for polydipsia and polyuria  Genitourinary: Negative for difficulty urinating and hematuria  Musculoskeletal: Negative for arthralgias, joint swelling and myalgias  Skin: Negative for rash and wound  Neurological: Positive for numbness  Negative for dizziness and headaches  Psychiatric/Behavioral: Negative for decreased concentration, dysphoric mood and suicidal ideas   The patient is not nervous/anxious           VITALS:      Vitals:     07/18/19 1538   BP: 137/84   Pulse: 83         LABS:  HgA1c:         Lab Results   Component Value Date     HGBA1C 6 1 05/23/2018      BMP:         Lab Results   Component Value Date     GLUCOSE 101 08/13/2014     CALCIUM 8 9 05/23/2018      (L) 08/13/2014     K 4 3 05/23/2018     CO2 27 05/23/2018      05/23/2018     BUN 14 05/23/2018     CREATININE 1 07 05/23/2018         _____________________________________________________  PHYSICAL EXAMINATION:  General: well developed and well nourished, alert, oriented times 3 and appears comfortable  Psychiatric: Normal  HEENT: Normocephalic, Atraumatic Trachea Midline, No torticollis  Pulmonary: No audible wheezing or respiratory distress   Cardiovascular: No pitting edema, 2+ radial pulse   Skin: No masses, erythema, lacerations, fluctation, ulcerations  Neurovascular: Sensation intact to the Ulnar Nerve, Sensation Intact to the Radial Nerve, Motor Intact to the Ulnar Nerve, Motor Intact to the Radial Nerve and Pulses Intact  Musculoskeletal: Normal, except as noted in detailed exam and in HPI         MUSCULOSKELETAL EXAMINATION:     Right Carpal Tunnel Exam:  Negative thenar atrophy  Negative phalen's test  Negative carpal tunnel compression  Positive tinels over median nerve at the wrist       Left Carpal Tunnel Exam:  Negative thenar atrophy  Negative phalen's test  Negative carpal tunnel compression  Positive tinels over median nerve at the wrist        ___________________________________________________  STUDIES REVIEWED:  I have personally reviewed bilateral upper extremity EMG's which demonstrates bilateral moderate carpal tunnel syndrome    Right worse than left no motor changes noted

## 2019-09-05 ENCOUNTER — OFFICE VISIT (OUTPATIENT)
Dept: OBGYN CLINIC | Facility: CLINIC | Age: 44
End: 2019-09-05

## 2019-09-05 VITALS
HEIGHT: 72 IN | SYSTOLIC BLOOD PRESSURE: 129 MMHG | DIASTOLIC BLOOD PRESSURE: 80 MMHG | HEART RATE: 67 BPM | BODY MASS INDEX: 36.57 KG/M2 | WEIGHT: 270 LBS

## 2019-09-05 DIAGNOSIS — Z98.890 S/P CARPAL TUNNEL RELEASE: Primary | ICD-10-CM

## 2019-09-05 PROCEDURE — 99024 POSTOP FOLLOW-UP VISIT: CPT | Performed by: PHYSICIAN ASSISTANT

## 2019-09-05 NOTE — PROGRESS NOTES
Assessment/Plan:  Patient ID: Linh Brooks 37 y o  male   Surgery: Carpal Tunnel Release - Left  Date of Surgery: 8/20/2019    Sutures removed today, incision is well healed with no evidence of infection   Begin scar massage  Activities as tolerated  Of note - patient broke out in rash after surgery on the operative arm, likely due to prep used prior to surgery  Follow Up:  PRN    To Do Next Visit:         CHIEF COMPLAINT:  Chief Complaint   Patient presents with    Left Wrist - Post-op         SUBJECTIVE:  Linh Brooks is a 37y o  year old male who presents for follow up after Carpal Tunnel Release - Left  Today patient has no complaints of pain and states his carpal tunnel symptoms are improving  He did wake up once with pain but feels ok now  Patient notes that after surgery he broke out in a rash on the operative arm which has since resolved  Denies fever/chills         PHYSICAL EXAMINATION:  General: well developed and well nourished, alert, oriented times 3 and appears comfortable  Psychiatric: Normal    MUSCULOSKELETAL EXAMINATION:  Incision: Clean, dry, intact  Surgery Site: normal, no evidence of infection   Range of Motion: opposition intact and full composite fist possible  Neurovascular status: Neuro intact, good cap refill  Activity Restrictions: No restrictions  Done today: Sutures out      STUDIES REVIEWED:  No Studies to review      PROCEDURES PERFORMED:  Procedures  No Procedures performed today      Baljeet Rausch PA-C

## 2020-01-20 ENCOUNTER — HOSPITAL ENCOUNTER (OUTPATIENT)
Dept: RADIOLOGY | Facility: HOSPITAL | Age: 45
Discharge: HOME | End: 2020-01-20
Payer: COMMERCIAL

## 2020-01-20 ENCOUNTER — OFFICE VISIT (OUTPATIENT)
Dept: OBGYN CLINIC | Facility: HOSPITAL | Age: 45
End: 2020-01-20
Payer: COMMERCIAL

## 2020-01-20 VITALS
SYSTOLIC BLOOD PRESSURE: 136 MMHG | HEART RATE: 80 BPM | BODY MASS INDEX: 37.93 KG/M2 | DIASTOLIC BLOOD PRESSURE: 85 MMHG | HEIGHT: 72 IN | WEIGHT: 280 LBS

## 2020-01-20 DIAGNOSIS — M79.641 RIGHT HAND PAIN: Primary | ICD-10-CM

## 2020-01-20 DIAGNOSIS — M66.241 NONTRAUMATIC SUBLUXATION OF EXTENSOR TENDON AT METACARPOPHALANGEAL JOINT OF RIGHT HAND: ICD-10-CM

## 2020-01-20 DIAGNOSIS — M79.641 RIGHT HAND PAIN: ICD-10-CM

## 2020-01-20 PROCEDURE — 99213 OFFICE O/P EST LOW 20 MIN: CPT | Performed by: PHYSICIAN ASSISTANT

## 2020-01-20 PROCEDURE — 73130 X-RAY EXAM OF HAND: CPT

## 2020-01-20 RX ORDER — MELOXICAM 15 MG/1
15 TABLET ORAL DAILY
Qty: 14 TABLET | Refills: 0 | Status: SHIPPED | OUTPATIENT
Start: 2020-01-20 | End: 2020-07-14

## 2020-01-20 NOTE — PROGRESS NOTES
Assessment/Plan   Diagnoses and all orders for this visit:    Right hand pain  -     XR hand 3+ vw right; Future    Nontraumatic subluxation of extensor tendon at metacarpophalangeal joint of right hand    - Mobic   - Follow up with Dr Leobardo Gallo   Patient ID: Roque Rebolleod is a 53 y o  male  Vitals:    01/20/20 0828   BP: 136/85   Pulse: [de-identified]     45yo male comes in for an evaluation of his right hand  For about 3 weeks, he has been having pain in the 3rd MCP joint with no injury  It doesn't happen all the time, but a few times per day, when he flexes the mcp, he feels a pop sensation over the dorsal aspect  This is very painful  The pain is dull in character, mild in severity, pain does not radiate and is not associated with numbness  He is a recent patient of Dr Aubrey Carter for CTS          The following portions of the patient's history were reviewed and updated as appropriate: allergies, current medications, past family history, past medical history, past social history, past surgical history and problem list     Review of Systems  Ortho Exam  Past Medical History:   Diagnosis Date    Anesthesia     "woke up during procedure"     Past Surgical History:   Procedure Laterality Date    EYE SURGERY      lasik    FOOT SURGERY Bilateral     hammer toe    FOREARM SURGERY Right     HAND SURGERY Left     MD REVISE MEDIAN N/CARPAL TUNNEL SURG Left 8/20/2019    Procedure: CARPAL TUNNEL RELEASE;  Surgeon: Meagan Ballard MD;  Location: [facility];  Service: Orthopedics    SHOULDER SURGERY Right     x2     Family History   Problem Relation Age of Onset    Diabetes Father     Diabetes Paternal Grandmother      Social History     Occupational History    Not on file   Tobacco Use    Smoking status: Current Every Day Smoker     Packs/day: 2 00     Types: Cigarettes    Smokeless tobacco: Never Used   Substance and Sexual Activity    Alcohol use: Yes     Frequency: 4 or more times a week Drinks per session: 1 or 2     Comment: beer    Drug use: Never    Sexual activity: Not on file       Review of Systems   Constitutional: Negative  HENT: Negative  Eyes: Negative  Respiratory: Negative  Cardiovascular: Negative  Gastrointestinal: Negative  Endocrine: Negative  Genitourinary: Negative  Musculoskeletal: As below      Allergic/Immunologic: Negative  Neurological: Negative  Hematological: Negative  Psychiatric/Behavioral: Negative  Objective   Physical Exam    · Constitutional: Awake, Alert, Oriented  · Eyes: EOMI  · Psych: Mood and affect appropriate  · Heart: regular rate and rhythm  · Lungs: No audible wheezing  · Abdomen: soft  · Lymph: no lymphedema   right long finger:  - Appearance   No swelling, discoloration, deformity, or ecchymosis  - Palpation  o No tenderness to palpation of distal radius, distal ulna, scaphoid, lunate, hamate, ulnar wrist, dorsal wrist, palmar wrist, thenar eminence, hypothenar eminence, or hand   - ROM  o palmar flexion 90, dorsiflexion 90, pronation 90, supination 90,  radial deviation 25, ulnar deviation 25  - Motor  o  5/5, wrist extension 5/5, and wrist flexion 5/5, interossi 5/5  - Special Tests  o He is not able to reproduce the popping over the joint while in the office today  - NVI distally    I have personally reviewed pertinent films in PACS and my interpretation is no fracture

## 2020-01-29 ENCOUNTER — OFFICE VISIT (OUTPATIENT)
Dept: OBGYN CLINIC | Facility: CLINIC | Age: 45
End: 2020-01-29
Payer: COMMERCIAL

## 2020-01-29 VITALS
BODY MASS INDEX: 37.93 KG/M2 | HEART RATE: 69 BPM | WEIGHT: 280 LBS | SYSTOLIC BLOOD PRESSURE: 158 MMHG | HEIGHT: 72 IN | DIASTOLIC BLOOD PRESSURE: 92 MMHG

## 2020-01-29 DIAGNOSIS — M66.241 NONTRAUMATIC SUBLUXATION OF EXTENSOR TENDON AT METACARPOPHALANGEAL JOINT OF RIGHT HAND: Primary | ICD-10-CM

## 2020-01-29 PROCEDURE — 99214 OFFICE O/P EST MOD 30 MIN: CPT | Performed by: SURGERY

## 2020-01-29 NOTE — PROGRESS NOTES
ASSESSMENT/PLAN:      53 y o  male with right long finger extensor tendonitis/ longitudinal tear vs sagittal band injury  A Dynamic ultrasound was ordered today to evaluate his extensor tendon as well as the sagittal bands  On physical exam, Mortimer Salk does not have obvious subluxation of his extensor tendon  It was discussed today that he could possibly have a tear in one of his sagittal bands  A Dynamic ultrasound was ordered today  I will see him back in the office once the ultrasound is complete to discuss a plan of care  The patient verbalized understanding of exam findings and treatment plan  We engaged in the shared decision-making process and treatment options were discussed at length with the patient  Surgical and conservative management discussed today along with risks and benefits  Diagnoses and all orders for this visit:    Nontraumatic subluxation of extensor tendon at metacarpophalangeal joint of right hand  -     US MSK limited; Future        Follow Up:  Return after MSK  To Do Next Visit:  Re-evaluation of current issue    ____________________________________________________________________________________________________________________________________________      CHIEF COMPLAINT:  Chief Complaint   Patient presents with    Right Hand - Follow-up       SUBJECTIVE:  Armando Laguerre is a 53y o  year old  male who presents to the office today for right long finger pain  Mortimer Salk notes pain to his right long finger MP joint with flexion of his finger  He notes a feeling of locking when in the flexed position, to the point he has to crack his knuckle  He denies any injury or trama  He is taking Mobic for pain control  I have personally reviewed all the relevant PMH, PSH, SH, FH, Medications and allergies       PAST MEDICAL HISTORY:  Past Medical History:   Diagnosis Date    Anesthesia     "woke up during procedure"       PAST SURGICAL HISTORY:  Past Surgical History:   Procedure Laterality Date    EYE SURGERY      lasik    FOOT SURGERY Bilateral     hammer toe    FOREARM SURGERY Right     HAND SURGERY Left     IA REVISE MEDIAN N/CARPAL TUNNEL SURG Left 8/20/2019    Procedure: CARPAL TUNNEL RELEASE;  Surgeon: Oscar Dai MD;  Location: [facility];  Service: Orthopedics    SHOULDER SURGERY Right     x2       FAMILY HISTORY:  Family History   Problem Relation Age of Onset    Diabetes Father     Diabetes Paternal Grandmother        SOCIAL HISTORY:  Social History     Tobacco Use    Smoking status: Current Every Day Smoker     Packs/day: 2 00     Types: Cigarettes    Smokeless tobacco: Never Used   Substance Use Topics    Alcohol use: Yes     Frequency: 4 or more times a week     Drinks per session: 1 or 2     Comment: beer    Drug use: Never       MEDICATIONS:    Current Outpatient Medications:     meloxicam (MOBIC) 15 mg tablet, Take 1 tablet (15 mg total) by mouth daily, Disp: 14 tablet, Rfl: 0    ALLERGIES:  Allergies   Allergen Reactions    Adhesive [Medical Tape] Hives       REVIEW OF SYSTEMS:  Review of Systems   Constitutional: Negative for chills, fever and unexpected weight change  HENT: Negative for hearing loss, nosebleeds and sore throat  Eyes: Negative for pain, redness and visual disturbance  Respiratory: Negative for cough, shortness of breath and wheezing  Cardiovascular: Negative for chest pain, palpitations and leg swelling  Gastrointestinal: Negative for abdominal pain, nausea and vomiting  Endocrine: Negative for polydipsia and polyuria  Genitourinary: Negative for difficulty urinating and hematuria  Musculoskeletal: Negative for arthralgias, joint swelling and myalgias  Skin: Negative for rash and wound  Neurological: Negative for dizziness, numbness and headaches  Psychiatric/Behavioral: Negative for decreased concentration, dysphoric mood and suicidal ideas  The patient is not nervous/anxious          VITALS:  Vitals: 01/29/20 0942   BP: 158/92   Pulse: 69       LABS:  HgA1c:   Lab Results   Component Value Date    HGBA1C 6 1 05/23/2018     BMP:   Lab Results   Component Value Date    GLUCOSE 101 08/13/2014    CALCIUM 9 4 08/06/2019     (L) 08/13/2014    K 4 0 08/06/2019    CO2 25 08/06/2019     08/06/2019    BUN 9 08/06/2019    CREATININE 1 07 08/06/2019       _____________________________________________________  PHYSICAL EXAMINATION:  General: well developed and well nourished, alert, oriented times 3 and appears comfortable  Psychiatric: Normal  HEENT: Normocephalic, Atraumatic Trachea Midline, No torticollis  Pulmonary: No audible wheezing or respiratory distress   Cardiovascular: No pitting edema, 2+ radial pulse   Skin: No masses, erythema, lacerations, fluctation, ulcerations  Neurovascular: Sensation Intact to the Median, Ulnar, Radial Nerve, Motor Intact to the Median, Ulnar, Radial Nerve and Pulses Intact  Musculoskeletal: Normal, except as noted in detailed exam and in HPI        MUSCULOSKELETAL EXAMINATION:    Right long finger:     No erythema  No ecchymosis   Mild edema ulnar aspect of MP joint   Tender to palpation MP joint   No obvious extensor tendon subluxation   Slight ulnar deviation of extensor tendon with flexion   Full ROM   Sensation intact to light touch   Brisk capillary refill     ___________________________________________________  STUDIES REVIEWED:  I have personally reviewed AP lateral and oblique radiographs of left long finger    which demonstrate no acute fracture dislocation may be mild narrowing of the joint but no significant degenerative changes noted           PROCEDURES PERFORMED:  Procedures  No Procedures performed today    _____________________________________________________      Marah Book    I,:   [place] Beckett am acting as a scribe while in the presence of the attending physician :        I,:   Anders Funes MD personally performed the services described in this documentation    as scribed in my presence :

## 2020-02-07 ENCOUNTER — OFFICE VISIT (OUTPATIENT)
Dept: PODIATRY | Facility: CLINIC | Age: 45
End: 2020-02-07
Payer: COMMERCIAL

## 2020-02-07 ENCOUNTER — TELEPHONE (OUTPATIENT)
Dept: PODIATRY | Facility: CLINIC | Age: 45
End: 2020-02-07

## 2020-02-07 ENCOUNTER — HOSPITAL ENCOUNTER (OUTPATIENT)
Dept: RADIOLOGY | Facility: HOSPITAL | Age: 45
Discharge: HOME | End: 2020-02-07
Attending: SURGERY
Payer: COMMERCIAL

## 2020-02-07 ENCOUNTER — TRANSCRIBE ORDERS (OUTPATIENT)
Dept: RADIOLOGY | Facility: HOSPITAL | Age: 45
End: 2020-02-07

## 2020-02-07 VITALS
HEIGHT: 72 IN | SYSTOLIC BLOOD PRESSURE: 163 MMHG | BODY MASS INDEX: 38.47 KG/M2 | DIASTOLIC BLOOD PRESSURE: 76 MMHG | HEART RATE: 97 BPM | WEIGHT: 284 LBS

## 2020-02-07 DIAGNOSIS — M21.42 PES PLANUS OF BOTH FEET: ICD-10-CM

## 2020-02-07 DIAGNOSIS — M67.00 SHORT ACHILLES TENDON, UNSPECIFIED LATERALITY: ICD-10-CM

## 2020-02-07 DIAGNOSIS — M66.241 NONTRAUMATIC SUBLUXATION OF EXTENSOR TENDON AT METACARPOPHALANGEAL JOINT OF RIGHT HAND: ICD-10-CM

## 2020-02-07 DIAGNOSIS — M20.41 HAMMER TOE OF RIGHT FOOT: Primary | ICD-10-CM

## 2020-02-07 DIAGNOSIS — M21.41 PES PLANUS OF BOTH FEET: ICD-10-CM

## 2020-02-07 DIAGNOSIS — M20.42 HAMMER TOE OF LEFT FOOT: ICD-10-CM

## 2020-02-07 DIAGNOSIS — M72.2 PLANTAR FASCIITIS: ICD-10-CM

## 2020-02-07 PROCEDURE — 76882 US LMTD JT/FCL EVL NVASC XTR: CPT

## 2020-02-07 PROCEDURE — 99203 OFFICE O/P NEW LOW 30 MIN: CPT | Performed by: PODIATRIST

## 2020-02-07 NOTE — PROGRESS NOTES
PATIENT:  Sriram Finley  1975       ASSESSMENT:     1  Hammer toe of right foot  XR foot 3+ vw right   2  Hammer toe of left foot  XR foot 3+ vw left   3  Pes planus of both feet     4  Plantar fasciitis     5  Short Achilles tendon, unspecified laterality               PLAN:  Patient was counseled and educated on the condition and the diagnosis  X-ray was obtained and personally reviewed  The radiological findings were discussed with the patient  The diagnosis, treatment options and prognosis were discussed with the patient  He has chronic pain associated with deformity and plantar fasciitis  Discussed surgical options for his foot deformity  Will try orthotics for now and schedule him for casting  Instructed supportive care, home exercise and proper footwear  He also has severe ankle equinus bilaterally that could be the main pathomechanics causing the deformity and pain  Instructed stretching exercise at home and consider PT  Subjective:       HPI  The patient presents with chief complaint of bilateral foot pain, left worse than right  He has chronic pain in his feet and relates to hammertoe  He also has chronic plantar fasciitis related to flat foot  He had multiple foot surgeries since he was in his 25s  He always had foot deformity even when he was a kid  He tried 2 orthotics in the past   He was doing well with first pairs  He had a second pairs in 2017  He has not been wearing them in the last 6 months because they were causing more pain  Pain is more intense around left 4th toe with deformity  Denied any swelling  No associated numbness or paresthesia  No significant weakness  The following portions of the patient's history were reviewed and updated as appropriate: allergies, current medications, past family history, past medical history, past social history, past surgical history and problem list   All pertinent labs and images were reviewed  Past Medical History  Past Medical History:   Diagnosis Date    Anesthesia     "woke up during procedure"       Past Surgical History  Past Surgical History:   Procedure Laterality Date    EYE SURGERY      lasik    FOOT SURGERY Bilateral     hammer toe    FOREARM SURGERY Right     HAND SURGERY Left     [place] REVISE MEDIAN N/CARPAL TUNNEL SURG Left 8/20/2019    Procedure: CARPAL TUNNEL RELEASE;  Surgeon: Hal Murillo MD;  [facility];  Service: Orthopedics    SHOULDER SURGERY Right     x2        Allergies:  Adhesive [medical tape]    Medications:  Current Outpatient Medications   Medication Sig Dispense Refill    meloxicam (MOBIC) 15 mg tablet Take 1 tablet (15 mg total) by mouth daily (Patient not taking: Reported on 2/7/2020) 14 tablet 0     No current facility-administered medications for this visit          Social History:  Social History     Socioeconomic History    Marital status: Single     Spouse name: None    Number of children: None    Years of education: None    Highest education level: None   Occupational History    None   Social Needs    Financial resource strain: None    Food insecurity:     Worry: None     Inability: None    Transportation needs:     Medical: None     Non-medical: None   Tobacco Use    Smoking status: Current Every Day Smoker     Packs/day: 2 00     Types: Cigarettes    Smokeless tobacco: Never Used   Substance and Sexual Activity    Alcohol use: Yes     Frequency: 4 or more times a week     Drinks per session: 1 or 2     Comment: beer    Drug use: Never    Sexual activity: None   Lifestyle    Physical activity:     Days per week: None     Minutes per session: None    Stress: None   Relationships    Social connections:     Talks on phone: None     Gets together: None     Attends Protestant service: None     Active member of club or organization: None     Attends meetings of clubs or organizations: None     Relationship status: None    Intimate partner violence:     Fear of current or ex partner: None     Emotionally abused: None     Physically abused: None     Forced sexual activity: None   Other Topics Concern    None   Social History Narrative    None          Review of Systems   Constitutional: Negative for chills and fever  Respiratory: Negative for cough and shortness of breath  Cardiovascular: Negative for chest pain  Gastrointestinal: Negative for diarrhea, nausea and vomiting  Endocrine: Negative for polyuria  Skin: Negative for color change and wound  Allergic/Immunologic: Negative for immunocompromised state  Neurological: Negative for weakness and numbness  Hematological: Does not bruise/bleed easily  Psychiatric/Behavioral: Negative for confusion  Objective:      /76   Pulse 97   Ht 6' (1 829 m)   Wt 129 kg (284 lb)   BMI 38 52 kg/m²          Physical Exam   Constitutional: He is oriented to person, place, and time  He appears well-developed and well-nourished  No distress  HENT:   Head: Normocephalic and atraumatic  Neck: Normal range of motion  Neck supple  Cardiovascular: Normal rate, regular rhythm and intact distal pulses  Pulmonary/Chest: Effort normal and breath sounds normal  No respiratory distress  Musculoskeletal: He exhibits no edema  Severe equinus noted with negative 10 degree dorsiflexion of foot when knee extended  Semi-rigid hammertoe deformity presents  S/P fusion on right 4th toe  Lateral angular deformity of hallux  Pes planus noted upon WB  Neurological: He is alert and oriented to person, place, and time  No cranial nerve deficit or sensory deficit  Coordination normal    Skin: Skin is dry  Capillary refill takes less than 2 seconds  No rash noted  No erythema  Psychiatric: His behavior is normal    Vitals reviewed

## 2020-02-07 NOTE — TELEPHONE ENCOUNTER
[employer]    Pt will be covered 90% after deductible has been met  He has met his deductible so he will owe apprx $125  He is also covered 100% if he meets his out of pocket of $7000  At this time he met $5728  13    Insurance will be billed at the time of

## 2020-02-13 ENCOUNTER — OFFICE VISIT (OUTPATIENT)
Dept: OBGYN CLINIC | Facility: CLINIC | Age: 45
End: 2020-02-13
Payer: COMMERCIAL

## 2020-02-13 VITALS
HEART RATE: 78 BPM | BODY MASS INDEX: 38.47 KG/M2 | HEIGHT: 72 IN | WEIGHT: 284 LBS | DIASTOLIC BLOOD PRESSURE: 77 MMHG | SYSTOLIC BLOOD PRESSURE: 151 MMHG

## 2020-02-13 DIAGNOSIS — M79.641 RIGHT HAND PAIN: ICD-10-CM

## 2020-02-13 DIAGNOSIS — M19.041 OSTEOARTHRITIS OF FINGER OF RIGHT HAND: Primary | ICD-10-CM

## 2020-02-13 PROCEDURE — 99213 OFFICE O/P EST LOW 20 MIN: CPT | Performed by: SURGERY

## 2020-02-13 NOTE — PROGRESS NOTES
ASSESSMENT/PLAN:      57-year-old male with right 3rd MP joint pain  Dynamic ultrasound negative for sagittal band injury or any concerning pathology  Patient states his pain is improved since he last saw us a would like to hold off on any steroid injection at this time  Discussed that his pain may flare up again, we will send him Voltaren gel in the meantime but we welcome the opportunity see him back should any flares occur and we may consider a steroid injection at that time    The patient verbalized understanding of exam findings and treatment plan  We engaged in the shared decision-making process and treatment options were discussed at length with the patient  Surgical and conservative management discussed today along with risks and benefits  Diagnoses and all orders for this visit:    Osteoarthritis of finger of right hand  -     diclofenac sodium (VOLTAREN) 1 %; Apply 2 g topically 4 (four) times a day    Right hand pain      Follow Up:  Return if symptoms worsen or fail to improve  To Do Next Visit:  Re-evaluation of current issue    ____________________________________________________________________________________________________________________________________________      CHIEF COMPLAINT:  Chief Complaint   Patient presents with    Right Hand - Follow-up       SUBJECTIVE:  Clarke Yates is a 53y o  year old RHD male who presents for follow-up evaluation of right long finger MP joint pain  He recently got a dynamic ultrasound to evaluate for sagittal band injury which was negative  Patient states his pain has improved a lot since his last visit with us and his pain is not significantly impacted his ability to function  Denies any numbness or tingling  I have personally reviewed all the relevant PMH, PSH, SH, FH, Medications and allergies       PAST MEDICAL HISTORY:  Past Medical History:   Diagnosis Date    Anesthesia     "woke up during procedure"       PAST SURGICAL HISTORY:  Past Surgical History:   Procedure Laterality Date    EYE SURGERY      lasik    FOOT SURGERY Bilateral     hammer toe    FOREARM SURGERY Right     HAND SURGERY Left     [place] REVISE MEDIAN N/CARPAL TUNNEL SURG Left 8/20/2019    Procedure: CARPAL TUNNEL RELEASE;  Surgeon: Nevaeh Broderick MD;  [facility];  Service: Orthopedics    SHOULDER SURGERY Right     x2       FAMILY HISTORY:  Family History   Problem Relation Age of Onset    Diabetes Father     Diabetes Paternal Grandmother        SOCIAL HISTORY:  Social History     Tobacco Use    Smoking status: Current Every Day Smoker     Packs/day: 2 00     Types: Cigarettes    Smokeless tobacco: Never Used   Substance Use Topics    Alcohol use: Yes     Frequency: 4 or more times a week     Drinks per session: 1 or 2     Comment: beer    Drug use: Never       MEDICATIONS:    Current Outpatient Medications:     diclofenac sodium (VOLTAREN) 1 %, Apply 2 g topically 4 (four) times a day, Disp: 1 Tube, Rfl: 1    meloxicam (MOBIC) 15 mg tablet, Take 1 tablet (15 mg total) by mouth daily (Patient not taking: Reported on 2/7/2020), Disp: 14 tablet, Rfl: 0    ALLERGIES:  Allergies   Allergen Reactions    Adhesive [Medical Tape] Hives       REVIEW OF SYSTEMS:  Review of Systems   Constitutional: Negative for chills, fatigue, fever and unexpected weight change  HENT: Negative for congestion, dental problem, facial swelling, hearing loss, sneezing, sore throat, trouble swallowing and voice change  Respiratory: Negative for chest tightness, shortness of breath, wheezing and stridor  Cardiovascular: Negative for chest pain, palpitations and leg swelling  Gastrointestinal: Negative for abdominal pain, diarrhea, nausea and vomiting  Endocrine: Negative for cold intolerance and heat intolerance  Musculoskeletal: Positive for arthralgias  Negative for joint swelling, myalgias and neck pain     Skin: Negative for color change, pallor, rash and wound  Neurological: Negative for tremors, facial asymmetry, speech difficulty, weakness and numbness  VITALS:  Vitals:    02/13/20 0836   BP: 151/77   Pulse: 78       LABS:  HgA1c:   Lab Results   Component Value Date    HGBA1C 6 1 05/23/2018     BMP:   Lab Results   Component Value Date    GLUCOSE 101 08/13/2014    CALCIUM 9 4 08/06/2019     (L) 08/13/2014    K 4 0 08/06/2019    CO2 25 08/06/2019     08/06/2019    BUN 9 08/06/2019    CREATININE 1 07 08/06/2019       _____________________________________________________  PHYSICAL EXAMINATION:  General: well developed and well nourished, alert, oriented times 3 and appears comfortable  Psychiatric: Normal  HEENT: Normocephalic, Atraumatic Trachea Midline, No torticollis  Pulmonary: No audible wheezing or respiratory distress   Cardiovascular: No pitting edema, 2+ radial pulse   Skin: No masses, erythema, lacerations, fluctation, ulcerations  Neurovascular: Sensation Intact to the Median, Ulnar, Radial Nerve, Motor Intact to the Median, Ulnar, Radial Nerve and Pulses Intact  Musculoskeletal: Normal, except as noted in detailed exam and in HPI  MUSCULOSKELETAL EXAMINATION:  Right hand:  No excessive swelling, no erythema, no ecchymosis  Very mild tenderness to palpation over 3rd MP joint  Range of motion intact at MP, PIP, DIP joints  No tenderness over radial sagittal band or any other joints    ___________________________________________________  STUDIES REVIEWED:  I have personally reviewed dynamic ultrasound which reveal no evidence of sagittal band injury of the right long finger, extensor tendons are intact without tenosynovitis          PROCEDURES PERFORMED:  Procedures  No Procedures performed today    _____________________________________________________    Yola Lick, PA-C        PA Cosign: I supervised the physician assistant and discussed the case with them    I personally performed history and physical exam  I agree with the assessment and plan of care as documented by the physician assistant

## 2020-06-19 ENCOUNTER — TRANSCRIBE ORDERS (OUTPATIENT)
Dept: LAB | Facility: AMBULARY SURGERY CENTER | Age: 45
End: 2020-06-19

## 2020-06-19 ENCOUNTER — APPOINTMENT (OUTPATIENT)
Dept: LAB | Facility: AMBULARY SURGERY CENTER | Age: 45
End: 2020-06-19
Payer: COMMERCIAL

## 2020-06-19 DIAGNOSIS — E78.2 MIXED HYPERLIPIDEMIA: Primary | ICD-10-CM

## 2020-06-19 DIAGNOSIS — R73.01 IMPAIRED FASTING GLUCOSE: ICD-10-CM

## 2020-06-19 DIAGNOSIS — Z11.3 SCREENING EXAMINATION FOR VENEREAL DISEASE: ICD-10-CM

## 2020-06-19 DIAGNOSIS — E78.2 MIXED HYPERLIPIDEMIA: ICD-10-CM

## 2020-06-19 LAB
ALBUMIN SERPL BCP-MCNC: 4 G/DL (ref 3.5–5)
ALP SERPL-CCNC: 55 U/L (ref 46–116)
ALT SERPL W P-5'-P-CCNC: 34 U/L (ref 12–78)
ANION GAP SERPL CALCULATED.3IONS-SCNC: 5 MMOL/L (ref 4–13)
AST SERPL W P-5'-P-CCNC: 11 U/L (ref 5–45)
BASOPHILS # BLD AUTO: 0.12 THOUSANDS/ΜL (ref 0–0.1)
BASOPHILS NFR BLD AUTO: 1 % (ref 0–1)
BILIRUB SERPL-MCNC: 0.72 MG/DL (ref 0.2–1)
BUN SERPL-MCNC: 15 MG/DL (ref 5–25)
C TRACH DNA SPEC QL NAA+PROBE: NEGATIVE
CALCIUM SERPL-MCNC: 9.5 MG/DL (ref 8.3–10.1)
CHLORIDE SERPL-SCNC: 103 MMOL/L (ref 100–108)
CHOLEST SERPL-MCNC: 199 MG/DL (ref 50–200)
CO2 SERPL-SCNC: 26 MMOL/L (ref 21–32)
CREAT SERPL-MCNC: 1.01 MG/DL (ref 0.6–1.3)
EOSINOPHIL # BLD AUTO: 0.26 THOUSAND/ΜL (ref 0–0.61)
EOSINOPHIL NFR BLD AUTO: 2 % (ref 0–6)
ERYTHROCYTE [DISTWIDTH] IN BLOOD BY AUTOMATED COUNT: 13.2 % (ref 11.6–15.1)
EST. AVERAGE GLUCOSE BLD GHB EST-MCNC: 123 MG/DL
GFR SERPL CREATININE-BSD FRML MDRD: 89 ML/MIN/1.73SQ M
GLUCOSE P FAST SERPL-MCNC: 108 MG/DL (ref 65–99)
HAV IGM SER QL: NORMAL
HBA1C MFR BLD: 5.9 %
HBV CORE IGM SER QL: NORMAL
HBV SURFACE AG SER QL: NORMAL
HCT VFR BLD AUTO: 48.7 % (ref 36.5–49.3)
HCV AB SER QL: NORMAL
HDLC SERPL-MCNC: 22 MG/DL
HGB BLD-MCNC: 16.3 G/DL (ref 12–17)
IMM GRANULOCYTES # BLD AUTO: 0.05 THOUSAND/UL (ref 0–0.2)
IMM GRANULOCYTES NFR BLD AUTO: 0 % (ref 0–2)
LDLC SERPL CALC-MCNC: 142 MG/DL (ref 0–100)
LYMPHOCYTES # BLD AUTO: 3.68 THOUSANDS/ΜL (ref 0.6–4.47)
LYMPHOCYTES NFR BLD AUTO: 27 % (ref 14–44)
MCH RBC QN AUTO: 30.6 PG (ref 26.8–34.3)
MCHC RBC AUTO-ENTMCNC: 33.5 G/DL (ref 31.4–37.4)
MCV RBC AUTO: 91 FL (ref 82–98)
MONOCYTES # BLD AUTO: 0.96 THOUSAND/ΜL (ref 0.17–1.22)
MONOCYTES NFR BLD AUTO: 7 % (ref 4–12)
N GONORRHOEA DNA SPEC QL NAA+PROBE: NEGATIVE
NEUTROPHILS # BLD AUTO: 8.4 THOUSANDS/ΜL (ref 1.85–7.62)
NEUTS SEG NFR BLD AUTO: 63 % (ref 43–75)
NONHDLC SERPL-MCNC: 177 MG/DL
NRBC BLD AUTO-RTO: 0 /100 WBCS
PLATELET # BLD AUTO: 299 THOUSANDS/UL (ref 149–390)
PMV BLD AUTO: 10 FL (ref 8.9–12.7)
POTASSIUM SERPL-SCNC: 4.2 MMOL/L (ref 3.5–5.3)
PROT SERPL-MCNC: 8 G/DL (ref 6.4–8.2)
RBC # BLD AUTO: 5.33 MILLION/UL (ref 3.88–5.62)
SODIUM SERPL-SCNC: 134 MMOL/L (ref 136–145)
TRIGL SERPL-MCNC: 177 MG/DL
WBC # BLD AUTO: 13.47 THOUSAND/UL (ref 4.31–10.16)

## 2020-06-19 PROCEDURE — 85025 COMPLETE CBC W/AUTO DIFF WBC: CPT

## 2020-06-19 PROCEDURE — 87491 CHLMYD TRACH DNA AMP PROBE: CPT

## 2020-06-19 PROCEDURE — 80061 LIPID PANEL: CPT

## 2020-06-19 PROCEDURE — 80074 ACUTE HEPATITIS PANEL: CPT

## 2020-06-19 PROCEDURE — 87591 N.GONORRHOEAE DNA AMP PROB: CPT

## 2020-06-19 PROCEDURE — 80053 COMPREHEN METABOLIC PANEL: CPT

## 2020-06-19 PROCEDURE — 83036 HEMOGLOBIN GLYCOSYLATED A1C: CPT

## 2020-06-19 PROCEDURE — 87389 HIV-1 AG W/HIV-1&-2 AB AG IA: CPT

## 2020-06-19 PROCEDURE — 36415 COLL VENOUS BLD VENIPUNCTURE: CPT

## 2020-06-21 LAB — HIV 1+2 AB+HIV1 P24 AG SERPL QL IA: NORMAL

## 2020-06-30 ENCOUNTER — HOSPITAL ENCOUNTER (OUTPATIENT)
Dept: RADIOLOGY | Facility: HOSPITAL | Age: 45
Discharge: HOME | End: 2020-06-30
Attending: FAMILY MEDICINE
Payer: COMMERCIAL

## 2020-06-30 ENCOUNTER — TRANSCRIBE ORDERS (OUTPATIENT)
Dept: ADMINISTRATIVE | Facility: HOSPITAL | Age: 45
End: 2020-06-30

## 2020-06-30 DIAGNOSIS — M25.511 RIGHT SHOULDER PAIN, UNSPECIFIED CHRONICITY: ICD-10-CM

## 2020-06-30 DIAGNOSIS — M25.511 RIGHT SHOULDER PAIN, UNSPECIFIED CHRONICITY: Primary | ICD-10-CM

## 2020-06-30 PROCEDURE — 73030 X-RAY EXAM OF SHOULDER: CPT

## 2020-07-14 ENCOUNTER — OFFICE VISIT (OUTPATIENT)
Dept: OBGYN CLINIC | Facility: CLINIC | Age: 45
End: 2020-07-14
Payer: COMMERCIAL

## 2020-07-14 VITALS
HEART RATE: 77 BPM | DIASTOLIC BLOOD PRESSURE: 80 MMHG | HEIGHT: 72 IN | WEIGHT: 284 LBS | SYSTOLIC BLOOD PRESSURE: 148 MMHG | BODY MASS INDEX: 38.47 KG/M2

## 2020-07-14 DIAGNOSIS — M54.12 RIGHT CERVICAL RADICULOPATHY: Primary | ICD-10-CM

## 2020-07-14 DIAGNOSIS — M25.511 ACUTE PAIN OF RIGHT SHOULDER: ICD-10-CM

## 2020-07-14 DIAGNOSIS — Z98.890 HISTORY OF REPAIR OF RIGHT ROTATOR CUFF: ICD-10-CM

## 2020-07-14 PROCEDURE — 99203 OFFICE O/P NEW LOW 30 MIN: CPT | Performed by: ORTHOPAEDIC SURGERY
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
## 2020-11-09 ENCOUNTER — HOSPITAL ENCOUNTER (OUTPATIENT)
Dept: NEUROLOGY | Facility: CLINIC | Age: 45
Discharge: HOME | End: 2020-11-09
Payer: COMMERCIAL

## 2020-11-09 DIAGNOSIS — M54.12 RIGHT CERVICAL RADICULOPATHY: ICD-10-CM

## 2020-11-09 PROCEDURE — 95886 MUSC TEST DONE W/N TEST COMP: CPT | Performed by: PSYCHIATRY & NEUROLOGY

## 2020-11-09 PROCEDURE — 95909 NRV CNDJ TST 5-6 STUDIES: CPT | Performed by: PSYCHIATRY & NEUROLOGY

## 2020-11-18 ENCOUNTER — OFFICE VISIT (OUTPATIENT)
Dept: OBGYN CLINIC | Facility: CLINIC | Age: 45
End: 2020-11-18
Payer: COMMERCIAL

## 2020-11-18 VITALS — DIASTOLIC BLOOD PRESSURE: 90 MMHG | SYSTOLIC BLOOD PRESSURE: 156 MMHG | TEMPERATURE: 96.7 F | HEART RATE: 76 BPM

## 2020-11-18 DIAGNOSIS — G56.01 CARPAL TUNNEL SYNDROME OF RIGHT WRIST: Primary | ICD-10-CM

## 2020-11-18 PROCEDURE — 99214 OFFICE O/P EST MOD 30 MIN: CPT | Performed by: SURGERY

## 2020-11-30 RX ORDER — ACETAMINOPHEN/DIPHENHYDRAMINE 500MG-25MG
1 TABLET ORAL
COMMUNITY

## 2020-12-04 ENCOUNTER — HOSPITAL ENCOUNTER (OUTPATIENT)
Facility: HOSPITAL | Age: 45
Setting detail: OUTPATIENT SURGERY
Discharge: HOME | End: 2020-12-04
Attending: SURGERY | Admitting: SURGERY
Payer: COMMERCIAL

## 2020-12-04 ENCOUNTER — ANESTHESIA EVENT (OUTPATIENT)
Dept: PERIOP | Facility: HOSPITAL | Age: 45
End: 2020-12-04
Payer: COMMERCIAL

## 2020-12-04 ENCOUNTER — ANESTHESIA (OUTPATIENT)
Dept: PERIOP | Facility: HOSPITAL | Age: 45
End: 2020-12-04
Payer: COMMERCIAL

## 2020-12-04 VITALS
DIASTOLIC BLOOD PRESSURE: 75 MMHG | WEIGHT: 270 LBS | HEART RATE: 67 BPM | TEMPERATURE: 97.8 F | BODY MASS INDEX: 36.57 KG/M2 | SYSTOLIC BLOOD PRESSURE: 121 MMHG | HEIGHT: 72 IN | RESPIRATION RATE: 20 BRPM | OXYGEN SATURATION: 96 %

## 2020-12-04 VITALS — HEART RATE: 83 BPM

## 2020-12-04 DIAGNOSIS — G56.01 CARPAL TUNNEL SYNDROME OF RIGHT WRIST: Primary | ICD-10-CM

## 2020-12-04 DIAGNOSIS — Z47.89 AFTERCARE FOLLOWING SURGERY OF THE MUSCULOSKELETAL SYSTEM: ICD-10-CM

## 2020-12-04 PROBLEM — E66.9 CLASS 2 OBESITY IN ADULT: Status: ACTIVE | Noted: 2020-12-04

## 2020-12-04 PROBLEM — F17.200 SMOKING: Status: ACTIVE | Noted: 2020-12-04

## 2020-12-04 PROBLEM — E66.812 CLASS 2 OBESITY IN ADULT: Status: ACTIVE | Noted: 2020-12-04

## 2020-12-04 PROCEDURE — 64721 CARPAL TUNNEL SURGERY: CPT | Performed by: SURGERY

## 2020-12-04 RX ORDER — DEXAMETHASONE SODIUM PHOSPHATE 10 MG/ML
INJECTION, SOLUTION INTRAMUSCULAR; INTRAVENOUS AS NEEDED
Status: DISCONTINUED | OUTPATIENT
Start: 2020-12-04 | End: 2020-12-04

## 2020-12-04 RX ORDER — ONDANSETRON 2 MG/ML
INJECTION INTRAMUSCULAR; INTRAVENOUS AS NEEDED
Status: DISCONTINUED | OUTPATIENT
Start: 2020-12-04 | End: 2020-12-04

## 2020-12-04 RX ORDER — FENTANYL CITRATE/PF 50 MCG/ML
25 SYRINGE (ML) INJECTION
Status: DISCONTINUED | OUTPATIENT
Start: 2020-12-04 | End: 2020-12-04 | Stop reason: HOSPADM

## 2020-12-04 RX ORDER — PROPOFOL 10 MG/ML
INJECTION, EMULSION INTRAVENOUS AS NEEDED
Status: DISCONTINUED | OUTPATIENT
Start: 2020-12-04 | End: 2020-12-04

## 2020-12-04 RX ORDER — CEFAZOLIN SODIUM 1 G/3ML
INJECTION, POWDER, FOR SOLUTION INTRAMUSCULAR; INTRAVENOUS AS NEEDED
Status: DISCONTINUED | OUTPATIENT
Start: 2020-12-04 | End: 2020-12-04

## 2020-12-04 RX ORDER — PROPOFOL 10 MG/ML
INJECTION, EMULSION INTRAVENOUS CONTINUOUS PRN
Status: DISCONTINUED | OUTPATIENT
Start: 2020-12-04 | End: 2020-12-04

## 2020-12-04 RX ORDER — FENTANYL CITRATE 50 UG/ML
INJECTION, SOLUTION INTRAMUSCULAR; INTRAVENOUS AS NEEDED
Status: DISCONTINUED | OUTPATIENT
Start: 2020-12-04 | End: 2020-12-04

## 2020-12-04 RX ORDER — HYDROMORPHONE HCL/PF 1 MG/ML
0.2 SYRINGE (ML) INJECTION
Status: DISCONTINUED | OUTPATIENT
Start: 2020-12-04 | End: 2020-12-04 | Stop reason: HOSPADM

## 2020-12-04 RX ORDER — MIDAZOLAM HYDROCHLORIDE 2 MG/2ML
INJECTION, SOLUTION INTRAMUSCULAR; INTRAVENOUS AS NEEDED
Status: DISCONTINUED | OUTPATIENT
Start: 2020-12-04 | End: 2020-12-04

## 2020-12-04 RX ORDER — SODIUM CHLORIDE, SODIUM LACTATE, POTASSIUM CHLORIDE, CALCIUM CHLORIDE 600; 310; 30; 20 MG/100ML; MG/100ML; MG/100ML; MG/100ML
125 INJECTION, SOLUTION INTRAVENOUS CONTINUOUS
Status: DISCONTINUED | OUTPATIENT
Start: 2020-12-04 | End: 2020-12-04 | Stop reason: HOSPADM

## 2020-12-04 RX ORDER — CEFAZOLIN SODIUM 2 G/50ML
2000 SOLUTION INTRAVENOUS ONCE
Status: DISCONTINUED | OUTPATIENT
Start: 2020-12-04 | End: 2020-12-04 | Stop reason: HOSPADM

## 2020-12-04 RX ORDER — KETAMINE HYDROCHLORIDE 50 MG/ML
INJECTION, SOLUTION, CONCENTRATE INTRAMUSCULAR; INTRAVENOUS AS NEEDED
Status: DISCONTINUED | OUTPATIENT
Start: 2020-12-04 | End: 2020-12-04

## 2020-12-04 RX ORDER — HYDROCODONE BITARTRATE AND ACETAMINOPHEN 5; 325 MG/1; MG/1
1 TABLET ORAL EVERY 6 HOURS PRN
Status: DISCONTINUED | OUTPATIENT
Start: 2020-12-04 | End: 2020-12-04 | Stop reason: HOSPADM

## 2020-12-04 RX ORDER — LIDOCAINE HYDROCHLORIDE 10 MG/ML
0.5 INJECTION, SOLUTION EPIDURAL; INFILTRATION; INTRACAUDAL; PERINEURAL ONCE AS NEEDED
Status: DISCONTINUED | OUTPATIENT
Start: 2020-12-04 | End: 2020-12-04 | Stop reason: HOSPADM

## 2020-12-04 RX ORDER — MAGNESIUM HYDROXIDE 1200 MG/15ML
LIQUID ORAL AS NEEDED
Status: DISCONTINUED | OUTPATIENT
Start: 2020-12-04 | End: 2020-12-04 | Stop reason: HOSPADM

## 2020-12-04 RX ORDER — HYDROCODONE BITARTRATE AND ACETAMINOPHEN 5; 325 MG/1; MG/1
1 TABLET ORAL EVERY 6 HOURS PRN
Qty: 5 TABLET | Refills: 0 | Status: SHIPPED | OUTPATIENT
Start: 2020-12-04 | End: 2020-12-14

## 2020-12-04 RX ORDER — ONDANSETRON 2 MG/ML
4 INJECTION INTRAMUSCULAR; INTRAVENOUS ONCE AS NEEDED
Status: DISCONTINUED | OUTPATIENT
Start: 2020-12-04 | End: 2020-12-04 | Stop reason: HOSPADM

## 2020-12-04 RX ORDER — SODIUM CHLORIDE, SODIUM LACTATE, POTASSIUM CHLORIDE, CALCIUM CHLORIDE 600; 310; 30; 20 MG/100ML; MG/100ML; MG/100ML; MG/100ML
INJECTION, SOLUTION INTRAVENOUS CONTINUOUS PRN
Status: DISCONTINUED | OUTPATIENT
Start: 2020-12-04 | End: 2020-12-04

## 2020-12-04 RX ADMIN — FENTANYL CITRATE 25 MCG: 50 INJECTION INTRAMUSCULAR; INTRAVENOUS at 09:41

## 2020-12-04 RX ADMIN — KETAMINE HYDROCHLORIDE 20 MG: 50 INJECTION, SOLUTION INTRAMUSCULAR; INTRAVENOUS at 09:43

## 2020-12-04 RX ADMIN — FENTANYL CITRATE 25 MCG: 50 INJECTION INTRAMUSCULAR; INTRAVENOUS at 10:09

## 2020-12-04 RX ADMIN — CEFAZOLIN 2000 MG: 1 INJECTION, POWDER, FOR SOLUTION INTRAVENOUS at 09:41

## 2020-12-04 RX ADMIN — KETAMINE HYDROCHLORIDE 30 MG: 50 INJECTION, SOLUTION INTRAMUSCULAR; INTRAVENOUS at 09:42

## 2020-12-04 RX ADMIN — ONDANSETRON 4 MG: 2 INJECTION INTRAMUSCULAR; INTRAVENOUS at 09:52

## 2020-12-04 RX ADMIN — PROPOFOL 20 MG: 10 INJECTION, EMULSION INTRAVENOUS at 09:43

## 2020-12-04 RX ADMIN — PROPOFOL 60 MCG/KG/MIN: 10 INJECTION, EMULSION INTRAVENOUS at 09:41

## 2020-12-04 RX ADMIN — SODIUM CHLORIDE, SODIUM LACTATE, POTASSIUM CHLORIDE, AND CALCIUM CHLORIDE: .6; .31; .03; .02 INJECTION, SOLUTION INTRAVENOUS at 09:37

## 2020-12-04 RX ADMIN — SODIUM CHLORIDE, SODIUM LACTATE, POTASSIUM CHLORIDE, AND CALCIUM CHLORIDE 125 ML/HR: .6; .31; .03; .02 INJECTION, SOLUTION INTRAVENOUS at 09:03

## 2020-12-04 RX ADMIN — MIDAZOLAM 2 MG: 1 INJECTION INTRAMUSCULAR; INTRAVENOUS at 09:36

## 2020-12-04 RX ADMIN — DEXAMETHASONE SODIUM PHOSPHATE 10 MG: 10 INJECTION, SOLUTION INTRAMUSCULAR; INTRAVENOUS at 09:52

## 2020-12-16 ENCOUNTER — OFFICE VISIT (OUTPATIENT)
Dept: OBGYN CLINIC | Facility: CLINIC | Age: 45
End: 2020-12-16
Payer: COMMERCIAL

## 2020-12-16 VITALS
HEART RATE: 85 BPM | BODY MASS INDEX: 36.57 KG/M2 | DIASTOLIC BLOOD PRESSURE: 81 MMHG | SYSTOLIC BLOOD PRESSURE: 153 MMHG | WEIGHT: 270 LBS | HEIGHT: 72 IN

## 2020-12-16 DIAGNOSIS — Z98.890 S/P CARPAL TUNNEL RELEASE: ICD-10-CM

## 2020-12-16 DIAGNOSIS — R22.31 MASS OF RIGHT HAND: Primary | ICD-10-CM

## 2020-12-16 PROCEDURE — 99212 OFFICE O/P EST SF 10 MIN: CPT | Performed by: SURGERY

## 2020-12-16 PROCEDURE — 99024 POSTOP FOLLOW-UP VISIT: CPT | Performed by: SURGERY

## 2021-01-19 ENCOUNTER — HOSPITAL ENCOUNTER (OUTPATIENT)
Dept: RADIOLOGY | Facility: HOSPITAL | Age: 46
Discharge: HOME | End: 2021-01-19
Attending: SURGERY
Payer: COMMERCIAL

## 2021-01-19 ENCOUNTER — TRANSCRIBE ORDERS (OUTPATIENT)
Dept: RADIOLOGY | Facility: HOSPITAL | Age: 46
End: 2021-01-19

## 2021-01-19 DIAGNOSIS — R22.31 MASS OF RIGHT HAND: ICD-10-CM

## 2021-01-19 PROCEDURE — 76882 US LMTD JT/FCL EVL NVASC XTR: CPT

## 2021-01-19 RX ORDER — LIDOCAINE HYDROCHLORIDE 10 MG/ML
5 INJECTION, SOLUTION EPIDURAL; INFILTRATION; INTRACAUDAL; PERINEURAL ONCE
Status: DISCONTINUED | OUTPATIENT
Start: 2021-01-19 | End: 2021-01-19

## 2021-01-25 ENCOUNTER — OFFICE VISIT (OUTPATIENT)
Dept: OBGYN CLINIC | Facility: CLINIC | Age: 46
End: 2021-01-25
Payer: COMMERCIAL

## 2021-01-25 VITALS
BODY MASS INDEX: 37.93 KG/M2 | SYSTOLIC BLOOD PRESSURE: 147 MMHG | HEIGHT: 72 IN | HEART RATE: 90 BPM | DIASTOLIC BLOOD PRESSURE: 75 MMHG | WEIGHT: 280 LBS

## 2021-01-25 DIAGNOSIS — M25.532 PAIN IN LEFT WRIST: ICD-10-CM

## 2021-01-25 DIAGNOSIS — M72.0 DUPUYTREN'S DISEASE OF PALM OF RIGHT HAND: Primary | ICD-10-CM

## 2021-01-25 DIAGNOSIS — S63.522A: ICD-10-CM

## 2021-01-25 PROCEDURE — 20605 DRAIN/INJ JOINT/BURSA W/O US: CPT | Performed by: SURGERY

## 2021-01-25 PROCEDURE — 99214 OFFICE O/P EST MOD 30 MIN: CPT | Performed by: SURGERY

## 2021-01-25 RX ORDER — TRIAMCINOLONE ACETONIDE 40 MG/ML
20 INJECTION, SUSPENSION INTRA-ARTICULAR; INTRAMUSCULAR
Status: COMPLETED | OUTPATIENT
Start: 2021-01-25 | End: 2021-01-25

## 2021-01-25 RX ORDER — MELOXICAM 15 MG/1
15 TABLET ORAL DAILY
Qty: 30 TABLET | Refills: 0 | Status: SHIPPED | OUTPATIENT
Start: 2021-01-25

## 2021-01-25 RX ORDER — BUPIVACAINE HYDROCHLORIDE 2.5 MG/ML
0.5 INJECTION, SOLUTION INFILTRATION; PERINEURAL
Status: COMPLETED | OUTPATIENT
Start: 2021-01-25 | End: 2021-01-25

## 2021-01-25 RX ADMIN — TRIAMCINOLONE ACETONIDE 20 MG: 40 INJECTION, SUSPENSION INTRA-ARTICULAR; INTRAMUSCULAR at 08:19

## 2021-01-25 RX ADMIN — BUPIVACAINE HYDROCHLORIDE 0.5 ML: 2.5 INJECTION, SOLUTION INFILTRATION; PERINEURAL at 08:19

## 2021-01-25 NOTE — PROGRESS NOTES
ASSESSMENT/PLAN:      53-year-old male reviewed and ultrasound MSK the right hand showed early onset Dupuytren's nodules over the 4th and 5th metacarpal heads  On exam of the left wrist and thumb patient has mild CMC joint arthritis but majority of his pain is over the radiocarpal joint  An injection was administered today, which she tolerated well  Explained the cortisone will take affect within 7 days  In addition to the injection who prescribed Mobic 15 mg to be taken 1 time daily with food which were sent to his pharmacy  Patient shows understanding and agrees with this planned  We will follow up with him in 6 weeks for re-evaluation  The patient verbalized understanding of exam findings and treatment plan  We engaged in the shared decision-making process and treatment options were discussed at length with the patient  Surgical and conservative management discussed today along with risks and benefits  Diagnoses and all orders for this visit:    Dupuytren's disease of palm of right hand    Pain in left wrist  -     meloxicam (MOBIC) 15 mg tablet; Take 1 tablet (15 mg total) by mouth daily With food    Radiocarpal joint sprain, left, initial encounter  -     meloxicam (MOBIC) 15 mg tablet; Take 1 tablet (15 mg total) by mouth daily With food    Other orders  -     Medium joint arthrocentesis      Follow Up:  Return in about 6 weeks (around 3/8/2021) for left wrist       To Do Next Visit:  Re-evaluation of current issue    ____________________________________________________________________________________________________________________________________________      CHIEF COMPLAINT:  Chief Complaint   Patient presents with    Follow-up       SUBJECTIVE:  Raj Ann is a 50y o  year old RHD male who presents 2 days to review an ultrasound MSK of the right hand to further evaluate nodules on the palmar aspect of the 4th and 5th metacarpals    He noted these palmar masses on the right hand surgery post a right carpal tunnel release  He has a history of symptoms in the left hand that were evaluated by ultrasound and aspirated 2018  I have personally reviewed all the relevant PMH, PSH, SH, FH, Medications and allergies  PAST MEDICAL HISTORY:  Past Medical History:   Diagnosis Date    Anesthesia     "woke up during procedure"       PAST SURGICAL HISTORY:  Past Surgical History:   Procedure Laterality Date    EYE SURGERY      lasik    FOOT SURGERY Bilateral     hammer toe    FOREARM SURGERY Right     HAND SURGERY Left     [place] REVISE MEDIAN N/CARPAL TUNNEL SURG Left 8/20/2019    Procedure: CARPAL TUNNEL RELEASE;  Surgeon: Tobin Bob MD;  [facility];  Service: Orthopedics    [place] REVISE MEDIAN N/CARPAL TUNNEL SURG Right 12/4/2020    Procedure: RELEASE CARPAL TUNNEL;  Surgeon: Tobin Bob MD;  [facility];  Service: Orthopedics    SHOULDER SURGERY Right     x2       FAMILY HISTORY:  Family History   Problem Relation Age of Onset    Diabetes Father     Diabetes Paternal Grandmother        SOCIAL HISTORY:  Social History     Tobacco Use    Smoking status: Current Every Day Smoker     Packs/day: 1 50     Types: Cigarettes    Smokeless tobacco: Never Used    Tobacco comment: last cigarette at 9186   Substance Use Topics    Alcohol use: Yes     Alcohol/week: 4 0 standard drinks     Types: 4 Cans of beer per week     Frequency: 2-3 times a week     Drinks per session: 1 or 2     Binge frequency: Never    Drug use: Never       MEDICATIONS:    Current Outpatient Medications:     diphenhydrAMINE-acetaminophen (Tylenol PM Extra Strength)  MG TABS, Take 1 tablet by mouth daily at bedtime as needed for sleep, Disp: , Rfl:     ALLERGIES:  Allergies   Allergen Reactions    Adhesive [Medical Tape] Hives       REVIEW OF SYSTEMS:  Review of Systems   Constitutional: Negative for chills, fever and unexpected weight change     HENT: Negative for hearing loss, nosebleeds and sore throat  Eyes: Negative for pain, redness and visual disturbance  Respiratory: Negative for cough, shortness of breath and wheezing  Cardiovascular: Negative for chest pain, palpitations and leg swelling  Gastrointestinal: Negative for abdominal pain, nausea and vomiting  Endocrine: Negative for polydipsia and polyuria  Genitourinary: Negative for dysuria and hematuria  Skin: Negative for rash and wound  Neurological: Negative for dizziness, light-headedness and headaches  Psychiatric/Behavioral: Negative for decreased concentration, dysphoric mood and suicidal ideas  The patient is not nervous/anxious  VITALS:  Vitals:    01/25/21 0750   BP: 147/75   Pulse: 90       LABS:  HgA1c:   Lab Results   Component Value Date    HGBA1C 5 9 (H) 06/19/2020     BMP:   Lab Results   Component Value Date    GLUCOSE 101 08/13/2014    CALCIUM 9 5 06/19/2020     (L) 08/13/2014    K 4 2 06/19/2020    CO2 26 06/19/2020     06/19/2020    BUN 15 06/19/2020    CREATININE 1 01 06/19/2020       _____________________________________________________  PHYSICAL EXAMINATION:   General: well developed and well nourished, alert, oriented times 3 and appears comfortable  Psychiatric: Normal  HEENT: Normocephalic, Atraumatic Trachea Midline, No torticollis  Pulmonary: No audible wheezing or respiratory distress   Cardiovascular: No pitting edema, 2+ radial pulse   Skin: No masses, erythema, lacerations, fluctation, ulcerations  Neurovascular: Sensation Intact to the Median, Ulnar, Radial Nerve, Motor Intact to the Median, Ulnar, Radial Nerve and Pulses Intact  Musculoskeletal: Normal, except as noted in detailed exam and in HPI        MUSCULOSKELETAL EXAMINATION:  Right hand:   No swelling in the palm  Well healed carpal tunnel incision  + TTP over the dupuytren's nodules over the ring and small fingers  Sensation intact to light touch  Brisk capillary refill       Left hand:   Mild swelling over the thenar  Mild tenderness at the thumb CMC joint  + TTP over the radial carpal joint  Negative Valentine's test  Sensation intact to light touch  Brisk capillary refill    ___________________________________________________  STUDIES REVIEWED:  I have personally reviewed ultrasound MSK of the right hand  which demonstrate an early Dupuytren's contracture/nodule noted over the palmar aspect of the 4th and 5th metacarpal heads  PROCEDURES PERFORMED:  Medium joint arthrocentesis: L radiocarpal  Universal Protocol:  Consent: Verbal consent obtained  Risks and benefits: risks, benefits and alternatives were discussed  Consent given by: patient  Time out: Immediately prior to procedure a "time out" was called to verify the correct patient, procedure, equipment, support staff and site/side marked as required    Timeout called at: 1/25/2021 8:15 AM   Patient understanding: patient states understanding of the procedure being performed  Site marked: the operative site was marked  Patient identity confirmed: verbally with patient    Supporting Documentation  Indications: pain   Procedure Details  Location: wrist - L radiocarpal  Preparation: Patient was prepped and draped in the usual sterile fashion  Needle size: 25 G  Approach: dorsal  Medications administered: 20 mg triamcinolone acetonide 40 mg/mL; 0 5 mL bupivacaine 0 25 %    Patient tolerance: patient tolerated the procedure well with no immediate complications        _____________________________________________________      Eugena Living    I,:  Fiona Bowling am acting as a scribe while in the presence of the attending physician :       I,:  Little Andrews MD personally performed the services described in this documentation    as scribed in my presence :

## 2021-02-28 ENCOUNTER — IMMUNIZATIONS (OUTPATIENT)
Dept: FAMILY MEDICINE CLINIC | Facility: HOSPITAL | Age: 46
End: 2021-02-28

## 2021-02-28 DIAGNOSIS — Z23 ENCOUNTER FOR IMMUNIZATION: Primary | ICD-10-CM

## 2021-02-28 PROCEDURE — 91300 SARS-COV-2 / COVID-19 MRNA VACCINE (PFIZER-BIONTECH) 30 MCG: CPT

## 2021-02-28 PROCEDURE — 0001A SARS-COV-2 / COVID-19 MRNA VACCINE (PFIZER-BIONTECH) 30 MCG: CPT

## 2021-03-08 ENCOUNTER — OFFICE VISIT (OUTPATIENT)
Dept: OBGYN CLINIC | Facility: CLINIC | Age: 46
End: 2021-03-08
Payer: COMMERCIAL

## 2021-03-08 VITALS
DIASTOLIC BLOOD PRESSURE: 81 MMHG | BODY MASS INDEX: 37.93 KG/M2 | HEIGHT: 72 IN | WEIGHT: 280 LBS | SYSTOLIC BLOOD PRESSURE: 132 MMHG | HEART RATE: 66 BPM

## 2021-03-08 DIAGNOSIS — M72.0 DUPUYTREN'S DISEASE OF PALM OF RIGHT HAND: Primary | ICD-10-CM

## 2021-03-08 DIAGNOSIS — S63.522D: ICD-10-CM

## 2021-03-08 PROCEDURE — 99213 OFFICE O/P EST LOW 20 MIN: CPT | Performed by: SURGERY

## 2021-03-08 PROCEDURE — 3008F BODY MASS INDEX DOCD: CPT | Performed by: SURGERY

## 2021-03-08 NOTE — PROGRESS NOTES
ASSESSMENT/PLAN:      50 y o  male with right hand Dupuytren's disease and left thumb CMC arthritis and left wrist radiocarpal joint sprain  Suyapa Jefferson is doing well  Activities to tolerance, no restrictions  Advised to take NSAID's prior to strenuous activities  The radiocarpal joint CSI can be repeated every 3 months as needed  Follow up in the office as needed if symptoms worsen or fail to improve  The patient verbalized understanding of exam findings and treatment plan  We engaged in the shared decision-making process and treatment options were discussed at length with the patient  Surgical and conservative management discussed today along with risks and benefits  Diagnoses and all orders for this visit:    Dupuytren's disease of palm of right hand    Radiocarpal joint sprain, left, subsequent encounter      Follow Up:  Return if symptoms worsen or fail to improve  To Do Next Visit:  Re-evaluation of current issue    ____________________________________________________________________________________________________________________________________________      CHIEF COMPLAINT:  Chief Complaint   Patient presents with    Right Wrist - Follow-up       SUBJECTIVE:  Thi John is a 50y o  year old RHD male who presents to the office today for a follow up regarding left wrist pain  Suyapa Jefferson was last seen in the office on 01/25/2021, at which time a radiocarpal joint CSI was performed  Overall Suyapa Jefferson is doing well  He states that CSI was beneficial for him  Suyapa Jefferson has minimal to no pain at this time  He is not taking anything for pain control at this time  I have personally reviewed all the relevant PMH, PSH, SH, FH, Medications and allergies       PAST MEDICAL HISTORY:  Past Medical History:   Diagnosis Date    Anesthesia     "woke up during procedure"       PAST SURGICAL HISTORY:  Past Surgical History:   Procedure Laterality Date    EYE SURGERY      lasik    FOOT SURGERY Bilateral hammer toe    FOREARM SURGERY Right     HAND SURGERY Left     CT REVISE MEDIAN N/CARPAL TUNNEL SURG Left 8/20/2019    Procedure: CARPAL TUNNEL RELEASE;  Surgeon: Sofia Fitzpatrick MD;  [facility];  Service: Orthopedics    CT REVISE MEDIAN N/CARPAL TUNNEL SURG Right 12/4/2020    Procedure: RELEASE CARPAL TUNNEL;  Surgeon: Sofia Fitzpatrick MD;  [facility];  Service: Orthopedics    SHOULDER SURGERY Right     x2       FAMILY HISTORY:  Family History   Problem Relation Age of Onset    Diabetes Father     Diabetes Paternal Grandmother        SOCIAL HISTORY:  Social History     Tobacco Use    Smoking status: Current Every Day Smoker     Packs/day: 1 50     Types: Cigarettes    Smokeless tobacco: Never Used    Tobacco comment: last cigarette at 8389   Substance Use Topics    Alcohol use: Yes     Alcohol/week: 4 0 standard drinks     Types: 4 Cans of beer per week     Frequency: 2-3 times a week     Drinks per session: 1 or 2     Binge frequency: Never    Drug use: Never       MEDICATIONS:    Current Outpatient Medications:     diphenhydrAMINE-acetaminophen (Tylenol PM Extra Strength)  MG TABS, Take 1 tablet by mouth daily at bedtime as needed for sleep, Disp: , Rfl:     meloxicam (MOBIC) 15 mg tablet, Take 1 tablet (15 mg total) by mouth daily With food, Disp: 30 tablet, Rfl: 0    ALLERGIES:  Allergies   Allergen Reactions    Adhesive [Medical Tape] Hives       REVIEW OF SYSTEMS:  Review of Systems   Constitutional: Negative for chills, fever and unexpected weight change  HENT: Negative for hearing loss, nosebleeds and sore throat  Eyes: Negative for pain, redness and visual disturbance  Respiratory: Negative for cough, shortness of breath and wheezing  Cardiovascular: Negative for chest pain, palpitations and leg swelling  Gastrointestinal: Negative for abdominal pain, nausea and vomiting  Endocrine: Negative for polydipsia and polyuria     Genitourinary: Negative for difficulty urinating and hematuria  Musculoskeletal: Negative for arthralgias, joint swelling and myalgias  Skin: Negative for rash and wound  Neurological: Negative for dizziness, numbness and headaches  Psychiatric/Behavioral: Negative for decreased concentration, dysphoric mood and suicidal ideas  The patient is not nervous/anxious  VITALS:  Vitals:    03/08/21 0738   BP: 132/81   Pulse: 66       LABS:  HgA1c:   Lab Results   Component Value Date    HGBA1C 5 9 (H) 06/19/2020     BMP:   Lab Results   Component Value Date    GLUCOSE 101 08/13/2014    CALCIUM 9 5 06/19/2020     (L) 08/13/2014    K 4 2 06/19/2020    CO2 26 06/19/2020     06/19/2020    BUN 15 06/19/2020    CREATININE 1 01 06/19/2020       _____________________________________________________  PHYSICAL EXAMINATION:  General: well developed and well nourished, alert, oriented times 3 and appears comfortable  Psychiatric: Normal  HEENT: Normocephalic, Atraumatic Trachea Midline, No torticollis  Pulmonary: No audible wheezing or respiratory distress   Cardiovascular: No pitting edema, 2+ radial pulse   Skin: No masses, erythema, lacerations, fluctation, ulcerations  Neurovascular: Sensation Intact to the Median, Ulnar, Radial Nerve, Motor Intact to the Median, Ulnar, Radial Nerve and Pulses Intact  Musculoskeletal: Normal, except as noted in detailed exam and in HPI        MUSCULOSKELETAL EXAMINATION:    Left wrist:     No erythema, ecchymosis or edema  Radiocarpal joint non tender to palpation   Full wrist ROM   No pain with weightbearing on an extended wrist   Full composite fist   2+ radial pulse     ___________________________________________________  STUDIES REVIEWED:  No new imaging to review           PROCEDURES PERFORMED:  Procedures  No Procedures performed today    _____________________________________________________      Hugo Gabriella    I,:  Jacques Beckett am acting as a scribe while in the presence of the attending physician :       I,:  Leobardo Vidales MD personally performed the services described in this documentation    as scribed in my presence :

## 2021-03-21 ENCOUNTER — IMMUNIZATIONS (OUTPATIENT)
Dept: FAMILY MEDICINE CLINIC | Facility: HOSPITAL | Age: 46
End: 2021-03-21

## 2021-03-21 DIAGNOSIS — Z23 ENCOUNTER FOR IMMUNIZATION: Primary | ICD-10-CM

## 2021-03-21 PROCEDURE — 91300 SARS-COV-2 / COVID-19 MRNA VACCINE (PFIZER-BIONTECH) 30 MCG: CPT

## 2021-03-21 PROCEDURE — 0002A SARS-COV-2 / COVID-19 MRNA VACCINE (PFIZER-BIONTECH) 30 MCG: CPT

## 2021-07-30 ENCOUNTER — OFFICE VISIT (OUTPATIENT)
Dept: PODIATRY | Facility: CLINIC | Age: 46
End: 2021-07-30
Payer: COMMERCIAL

## 2021-07-30 VITALS
SYSTOLIC BLOOD PRESSURE: 122 MMHG | BODY MASS INDEX: 36.7 KG/M2 | HEIGHT: 72 IN | HEART RATE: 83 BPM | WEIGHT: 271 LBS | DIASTOLIC BLOOD PRESSURE: 74 MMHG

## 2021-07-30 DIAGNOSIS — M21.41 PES PLANUS OF BOTH FEET: Primary | ICD-10-CM

## 2021-07-30 DIAGNOSIS — M21.70 ACQUIRED UNEQUAL LIMB LENGTH: ICD-10-CM

## 2021-07-30 DIAGNOSIS — M67.00 SHORT ACHILLES TENDON, UNSPECIFIED LATERALITY: ICD-10-CM

## 2021-07-30 DIAGNOSIS — M20.42 HAMMER TOE OF LEFT FOOT: ICD-10-CM

## 2021-07-30 DIAGNOSIS — M21.42 PES PLANUS OF BOTH FEET: Primary | ICD-10-CM

## 2021-07-30 DIAGNOSIS — M20.41 HAMMER TOE OF RIGHT FOOT: ICD-10-CM

## 2021-07-30 PROCEDURE — 99214 OFFICE O/P EST MOD 30 MIN: CPT | Performed by: PODIATRIST

## 2021-07-30 PROCEDURE — 3008F BODY MASS INDEX DOCD: CPT | Performed by: PODIATRIST

## 2021-07-30 PROCEDURE — 4004F PT TOBACCO SCREEN RCVD TLK: CPT | Performed by: PODIATRIST

## 2021-07-30 NOTE — PROGRESS NOTES
PATIENT:  Wil Oliva  1975       ASSESSMENT:     1  Pes planus of both feet  Device prior authorization   2  Hammer toe of right foot  Device prior authorization   3  Hammer toe of left foot  Device prior authorization   4  Acquired unequal limb length  CT leg length evaluation (scanogram)    Device prior authorization   5  Short Achilles tendon, unspecified laterality             PLAN:  1  Patient was counseled and educated on the condition and the diagnosis  2  Reviewed previous office note  Previous X-ray was reviewed and discussed  3  The diagnosis, treatment options and prognosis were discussed with the patient  4  He has chronic pain associated with deformity  He also has severe ankle equinus bilaterally that could be the main pathomechanics causing the deformity and pain  Instructed stretching exercise at home and consider PT      5   Will try orthotics  Will call his insurance for possible coverage  6   Sent him for scanogram to rule out limb length discrepancy  7   RA in 3-4 weeks for orthotic casting  Subjective:       HPI  The patient presents with chief complaint of bilateral foot pain  He has chronic pain in his feet and relates to hammertoe and flatfoot  Pain increases after being on his feet for a while  No edema or redness  Denied any injury to his feet  No associated numbness or paresthesia  No significant weakness  He thinks his left leg is shorter and feels he is always leaning to his left side  The following portions of the patient's history were reviewed and updated as appropriate: allergies, current medications, past family history, past medical history, past social history, past surgical history and problem list   All pertinent labs and images were reviewed        Past Medical History  Past Medical History:   Diagnosis Date    Anesthesia     "woke up during procedure"       Past Surgical History  Past Surgical History: Procedure Laterality Date    EYE SURGERY      lasik    FOOT SURGERY Bilateral     hammer toe    FOREARM SURGERY Right     HAND SURGERY Left     MI REVISE MEDIAN N/CARPAL TUNNEL SURG Left 8/20/2019    Procedure: CARPAL TUNNEL RELEASE;  Surgeon: Angelica Holden MD;  [facility];  Service: Orthopedics    MI REVISE MEDIAN N/CARPAL TUNNEL SURG Right 12/4/2020    Procedure: RELEASE CARPAL TUNNEL;  Surgeon: Angelica Hodlen MD;  [facility];  Service: Orthopedics    SHOULDER SURGERY Right     x2        Allergies:  Adhesive [medical tape]    Medications:  Current Outpatient Medications   Medication Sig Dispense Refill    diphenhydrAMINE-acetaminophen (Tylenol PM Extra Strength)  MG TABS Take 1 tablet by mouth daily at bedtime as needed for sleep (Patient not taking: Reported on 7/30/2021)      meloxicam (MOBIC) 15 mg tablet Take 1 tablet (15 mg total) by mouth daily With food (Patient not taking: Reported on 7/30/2021) 30 tablet 0     No current facility-administered medications for this visit  Social History:  Social History     Socioeconomic History    Marital status: Single     Spouse name: None    Number of children: None    Years of education: None    Highest education level: None   Occupational History    None   Tobacco Use    Smoking status: Current Every Day Smoker     Packs/day: 1 50     Types: Cigarettes    Smokeless tobacco: Never Used    Tobacco comment: last cigarette at 0644   Vaping Use    Vaping Use: Never used   Substance and Sexual Activity    Alcohol use:  Yes     Alcohol/week: 4 0 standard drinks     Types: 4 Cans of beer per week    Drug use: Never    Sexual activity: None   Other Topics Concern    None   Social History Narrative    None     Social Determinants of Health     Financial Resource Strain:     Difficulty of Paying Living Expenses:    Food Insecurity:     Worried About Running Out of Food in the Last Year:     [employer] in the Last Year:    Transportation Needs:     Lack of Transportation (Medical):  Lack of Transportation (Non-Medical):    Physical Activity:     Days of Exercise per Week:     Minutes of Exercise per Session:    Stress:     Feeling of Stress :    Social Connections:     Frequency of Communication with Friends and Family:     Frequency of Social Gatherings with Friends and Family:     Attends Zoroastrian Services:     Active Member of Clubs or Organizations:     Attends Club or Organization Meetings:     Marital Status:    Intimate Partner Violence:     Fear of Current or Ex-Partner:     Emotionally Abused:     Physically Abused:     Sexually Abused:           Review of Systems   Constitutional: Negative for appetite change, chills and fever  Respiratory: Negative for cough and shortness of breath  Cardiovascular: Negative for chest pain and leg swelling  Gastrointestinal: Negative for diarrhea, nausea and vomiting  Skin: Negative for color change and wound  Allergic/Immunologic: Negative for immunocompromised state  Neurological: Negative for weakness and numbness  Hematological: Negative  Psychiatric/Behavioral: Negative for behavioral problems and confusion  Objective:      /74   Pulse 83   Ht 6' (1 829 m) Comment: verbal  Wt 123 kg (271 lb)   BMI 36 75 kg/m²          Physical Exam  Vitals reviewed  Constitutional:       General: He is not in acute distress  Appearance: He is well-developed  He is obese  He is not toxic-appearing  Cardiovascular:      Rate and Rhythm: Normal rate and regular rhythm  Pulses: Normal pulses  Dorsalis pedis pulses are 2+ on the right side and 2+ on the left side  Posterior tibial pulses are 2+ on the right side and 2+ on the left side  Pulmonary:      Effort: Pulmonary effort is normal  No respiratory distress  Musculoskeletal:         General: Deformity present  No swelling or signs of injury  Cervical back: Normal range of motion and neck supple  Right lower leg: No edema  Left lower leg: No edema  Right foot: No foot drop  Left foot: No foot drop  Comments: Severe equinus noted with negative 10 degree dorsiflexion of foot when knee extended  Semi-rigid hammertoe deformity presents  S/P fusion on right 4th toe  Lateral angular deformity of hallux  Pes planus noted upon WB  Skin:     General: Skin is warm and dry  Capillary Refill: Capillary refill takes less than 2 seconds  Coloration: Skin is not cyanotic or mottled  Findings: No abscess, ecchymosis, erythema, rash or wound  Nails: There is no clubbing  Neurological:      General: No focal deficit present  Mental Status: He is alert and oriented to person, place, and time  Cranial Nerves: No cranial nerve deficit  Sensory: No sensory deficit  Motor: No weakness  Coordination: Coordination normal    Psychiatric:         Mood and Affect: Mood normal          Behavior: Behavior normal          Thought Content:  Thought content normal          Judgment: Judgment normal

## 2021-08-06 ENCOUNTER — HOSPITAL ENCOUNTER (OUTPATIENT)
Dept: CT IMAGING | Facility: HOSPITAL | Age: 46
Discharge: HOME | End: 2021-08-06
Payer: COMMERCIAL

## 2021-08-06 DIAGNOSIS — M21.70 ACQUIRED UNEQUAL LIMB LENGTH: ICD-10-CM

## 2021-08-06 PROCEDURE — 77073 BONE LENGTH STUDIES: CPT

## 2021-09-16 ENCOUNTER — PROCEDURE VISIT (OUTPATIENT)
Dept: PODIATRY | Facility: CLINIC | Age: 46
End: 2021-09-16
Payer: COMMERCIAL

## 2021-09-16 VITALS — WEIGHT: 276.8 LBS | HEIGHT: 72 IN | BODY MASS INDEX: 37.49 KG/M2

## 2021-09-16 DIAGNOSIS — M20.41 HAMMER TOE OF RIGHT FOOT: ICD-10-CM

## 2021-09-16 DIAGNOSIS — M21.41 PES PLANUS OF BOTH FEET: Primary | ICD-10-CM

## 2021-09-16 DIAGNOSIS — M72.2 PLANTAR FASCIITIS: ICD-10-CM

## 2021-09-16 DIAGNOSIS — M21.42 PES PLANUS OF BOTH FEET: Primary | ICD-10-CM

## 2021-09-16 PROCEDURE — 99213 OFFICE O/P EST LOW 20 MIN: CPT | Performed by: PODIATRIST

## 2021-09-16 PROCEDURE — S0395 IMPRESSION CASTING FT: HCPCS | Performed by: PODIATRIST

## 2021-09-16 NOTE — PROGRESS NOTES
PATIENT:  Meli Parra  1975       ASSESSMENT:     1  Pes planus of both feet     2  Hammer toe of right foot     3  Plantar fasciitis             PLAN:  1  Patient was counseled and educated on the condition and the diagnosis  2  Reviewed previous office note  Scanogram was reviewed and discussed  No significant LLD  3  The diagnosis, treatment options and prognosis were discussed with the patient  4  Instructed stretching exercise at home and consider PT      5   He was casted for orthotics bilaterally  Will call him when it is ready to be picked  Subjective:       HPI  The patient presents for foot evaluation and orthotic casting  He has chronic hammertoe, flatfoot, and plantar fasciitis  No acute edema or numbness in his feet  The following portions of the patient's history were reviewed and updated as appropriate: allergies, current medications, past family history, past medical history, past social history, past surgical history and problem list   All pertinent labs and images were reviewed        Past Medical History  Past Medical History:   Diagnosis Date    Anesthesia     "woke up during procedure"       Past Surgical History  Past Surgical History:   Procedure Laterality Date    EYE SURGERY      lasik    FOOT SURGERY Bilateral     hammer toe    FOREARM SURGERY Right     HAND SURGERY Left     ME REVISE MEDIAN N/CARPAL TUNNEL SURG Left 8/20/2019    Procedure: CARPAL TUNNEL RELEASE;  Surgeon: Avinash Newsome MD;  [facility];  Service: Orthopedics    ME REVISE MEDIAN N/CARPAL TUNNEL SURG Right 12/4/2020    Procedure: RELEASE CARPAL TUNNEL;  Surgeon: Avinash Newsome MD;  [facility];  Service: Orthopedics    SHOULDER SURGERY Right     x2        Allergies:  Adhesive [medical tape]    Medications:  Current Outpatient Medications   Medication Sig Dispense Refill    diphenhydrAMINE-acetaminophen (Tylenol PM Extra Strength)  MG TABS Take 1 tablet by mouth daily at bedtime as needed for sleep (Patient not taking: Reported on 7/30/2021)      meloxicam (MOBIC) 15 mg tablet Take 1 tablet (15 mg total) by mouth daily With food (Patient not taking: Reported on 7/30/2021) 30 tablet 0     No current facility-administered medications for this visit  Social History:  Social History     Socioeconomic History    Marital status: Single     Spouse name: None    Number of children: None    Years of education: None    Highest education level: None   Occupational History    None   Tobacco Use    Smoking status: Current Every Day Smoker     Packs/day: 1 50     Types: Cigarettes    Smokeless tobacco: Never Used    Tobacco comment: last cigarette at 0644   Vaping Use    Vaping Use: Never used   Substance and Sexual Activity    Alcohol use: Yes     Alcohol/week: 4 0 standard drinks     Types: 4 Cans of beer per week    Drug use: Never    Sexual activity: None   Other Topics Concern    None   Social History Narrative    None     Social Determinants of Health     Financial Resource Strain:     Difficulty of Paying Living Expenses:    Food Insecurity:     Worried About Running Out of Food in the Last Year:     Ran Out of Food in the Last Year:    Transportation Needs:     Lack of Transportation (Medical):      Lack of Transportation (Non-Medical):    Physical Activity:     Days of Exercise per Week:     Minutes of Exercise per Session:    Stress:     Feeling of Stress :    Social Connections:     Frequency of Communication with Friends and Family:     Frequency of Social Gatherings with Friends and Family:     Attends Sikhism Services:     Active Member of Clubs or Organizations:     Attends Club or Organization Meetings:     Marital Status:    Intimate Partner Violence:     Fear of Current or Ex-Partner:     Emotionally Abused:     Physically Abused:     Sexually Abused:           Review of Systems   Constitutional: Negative for appetite change, chills and fever  Respiratory: Negative for cough and shortness of breath  Cardiovascular: Negative for chest pain and leg swelling  Gastrointestinal: Negative for diarrhea, nausea and vomiting  Neurological: Negative for weakness and numbness  Objective:      Ht 6' (1 829 m) Comment: verbal  Wt 126 kg (276 lb 12 8 oz)   BMI 37 54 kg/m²          Physical Exam  Vitals reviewed  Constitutional:       General: He is not in acute distress  Appearance: He is well-developed  He is obese  He is not toxic-appearing  Cardiovascular:      Rate and Rhythm: Normal rate and regular rhythm  Pulses: Normal pulses  Dorsalis pedis pulses are 2+ on the right side and 2+ on the left side  Posterior tibial pulses are 2+ on the right side and 2+ on the left side  Pulmonary:      Effort: Pulmonary effort is normal  No respiratory distress  Musculoskeletal:         General: Deformity present  No swelling or signs of injury  Cervical back: Normal range of motion and neck supple  Right lower leg: No edema  Left lower leg: No edema  Right foot: No foot drop  Left foot: No foot drop  Comments: Severe equinus bilateral ankle  Semi-rigid hammertoe deformity presents  S/P fusion on right 4th toe  Lateral angular deformity of hallux  Pes planus noted upon WB  Skin:     General: Skin is warm and dry  Capillary Refill: Capillary refill takes less than 2 seconds  Coloration: Skin is not cyanotic or mottled  Findings: No abscess, ecchymosis, erythema, rash or wound  Nails: There is no clubbing  Neurological:      General: No focal deficit present  Mental Status: He is alert and oriented to person, place, and time  Cranial Nerves: No cranial nerve deficit  Sensory: No sensory deficit  Motor: No weakness        Coordination: Coordination normal    Psychiatric:         Mood and Affect: Mood normal          Behavior: Behavior normal          Thought Content:  Thought content normal          Judgment: Judgment normal

## 2021-10-06 ENCOUNTER — TELEPHONE (OUTPATIENT)
Dept: PODIATRY | Facility: CLINIC | Age: 46
End: 2021-10-06

## 2021-10-28 ENCOUNTER — PROCEDURE VISIT (OUTPATIENT)
Dept: PODIATRY | Facility: CLINIC | Age: 46
End: 2021-10-28
Payer: COMMERCIAL

## 2021-10-28 VITALS — WEIGHT: 276 LBS | BODY MASS INDEX: 37.38 KG/M2 | HEIGHT: 72 IN

## 2021-10-28 DIAGNOSIS — M20.42 HAMMER TOE OF LEFT FOOT: ICD-10-CM

## 2021-10-28 DIAGNOSIS — M21.41 PES PLANUS OF BOTH FEET: Primary | ICD-10-CM

## 2021-10-28 DIAGNOSIS — M20.41 HAMMER TOE OF RIGHT FOOT: ICD-10-CM

## 2021-10-28 DIAGNOSIS — M21.42 PES PLANUS OF BOTH FEET: Primary | ICD-10-CM

## 2021-10-28 PROCEDURE — L3000 FT INSERT UCB BERKELEY SHELL: HCPCS | Performed by: PODIATRIST

## 2022-12-06 ENCOUNTER — APPOINTMENT (OUTPATIENT)
Dept: LAB | Facility: AMBULARY SURGERY CENTER | Age: 47
End: 2022-12-06

## 2022-12-06 DIAGNOSIS — E78.1 PURE HYPERGLYCERIDEMIA: ICD-10-CM

## 2022-12-06 DIAGNOSIS — R73.01 IMPAIRED FASTING GLUCOSE: ICD-10-CM

## 2022-12-06 DIAGNOSIS — E04.1 NONTOXIC UNINODULAR GOITER: ICD-10-CM

## 2022-12-06 DIAGNOSIS — D50.9 IRON DEFICIENCY ANEMIA, UNSPECIFIED IRON DEFICIENCY ANEMIA TYPE: ICD-10-CM

## 2022-12-06 LAB
ALBUMIN SERPL BCP-MCNC: 3.5 G/DL (ref 3.5–5)
ALP SERPL-CCNC: 48 U/L (ref 46–116)
ALT SERPL W P-5'-P-CCNC: 41 U/L (ref 12–78)
ANION GAP SERPL CALCULATED.3IONS-SCNC: 3 MMOL/L (ref 4–13)
AST SERPL W P-5'-P-CCNC: 15 U/L (ref 5–45)
BASOPHILS # BLD AUTO: 0.12 THOUSANDS/ÂΜL (ref 0–0.1)
BASOPHILS NFR BLD AUTO: 1 % (ref 0–1)
BILIRUB SERPL-MCNC: 0.53 MG/DL (ref 0.2–1)
BUN SERPL-MCNC: 11 MG/DL (ref 5–25)
CALCIUM SERPL-MCNC: 9 MG/DL (ref 8.3–10.1)
CHLORIDE SERPL-SCNC: 107 MMOL/L (ref 96–108)
CHOLEST SERPL-MCNC: 185 MG/DL
CO2 SERPL-SCNC: 26 MMOL/L (ref 21–32)
CREAT SERPL-MCNC: 0.93 MG/DL (ref 0.6–1.3)
EOSINOPHIL # BLD AUTO: 0.51 THOUSAND/ÂΜL (ref 0–0.61)
EOSINOPHIL NFR BLD AUTO: 6 % (ref 0–6)
ERYTHROCYTE [DISTWIDTH] IN BLOOD BY AUTOMATED COUNT: 13 % (ref 11.6–15.1)
EST. AVERAGE GLUCOSE BLD GHB EST-MCNC: 140 MG/DL
GFR SERPL CREATININE-BSD FRML MDRD: 96 ML/MIN/1.73SQ M
GLUCOSE P FAST SERPL-MCNC: 131 MG/DL (ref 65–99)
HBA1C MFR BLD: 6.5 %
HCT VFR BLD AUTO: 46.8 % (ref 36.5–49.3)
HDLC SERPL-MCNC: 18 MG/DL
HGB BLD-MCNC: 15.8 G/DL (ref 12–17)
IMM GRANULOCYTES # BLD AUTO: 0.02 THOUSAND/UL (ref 0–0.2)
IMM GRANULOCYTES NFR BLD AUTO: 0 % (ref 0–2)
LDLC SERPL CALC-MCNC: 126 MG/DL (ref 0–100)
LYMPHOCYTES # BLD AUTO: 2.6 THOUSANDS/ÂΜL (ref 0.6–4.47)
LYMPHOCYTES NFR BLD AUTO: 30 % (ref 14–44)
MCH RBC QN AUTO: 31 PG (ref 26.8–34.3)
MCHC RBC AUTO-ENTMCNC: 33.8 G/DL (ref 31.4–37.4)
MCV RBC AUTO: 92 FL (ref 82–98)
MONOCYTES # BLD AUTO: 0.83 THOUSAND/ÂΜL (ref 0.17–1.22)
MONOCYTES NFR BLD AUTO: 10 % (ref 4–12)
NEUTROPHILS # BLD AUTO: 4.57 THOUSANDS/ÂΜL (ref 1.85–7.62)
NEUTS SEG NFR BLD AUTO: 53 % (ref 43–75)
NONHDLC SERPL-MCNC: 167 MG/DL
NRBC BLD AUTO-RTO: 0 /100 WBCS
PLATELET # BLD AUTO: 263 THOUSANDS/UL (ref 149–390)
PMV BLD AUTO: 10.1 FL (ref 8.9–12.7)
POTASSIUM SERPL-SCNC: 4.1 MMOL/L (ref 3.5–5.3)
PROT SERPL-MCNC: 7.6 G/DL (ref 6.4–8.4)
RBC # BLD AUTO: 5.1 MILLION/UL (ref 3.88–5.62)
SODIUM SERPL-SCNC: 136 MMOL/L (ref 135–147)
TRIGL SERPL-MCNC: 203 MG/DL
TSH SERPL DL<=0.05 MIU/L-ACNC: 1.47 UIU/ML (ref 0.45–4.5)
WBC # BLD AUTO: 8.65 THOUSAND/UL (ref 4.31–10.16)

## 2023-01-04 ENCOUNTER — TELEPHONE (OUTPATIENT)
Dept: DIABETES SERVICES | Facility: CLINIC | Age: 48
End: 2023-01-04

## 2023-01-04 NOTE — TELEPHONE ENCOUNTER
Patient was referred by non-SL pcp for Living Well with DM program with dx R73 [id number]  I faxed dm services form back to pcp and explained that Living Well is only for patients with Type 2 dm   I will await the revised for before scheduling patient

## 2023-01-10 ENCOUNTER — TELEPHONE (OUTPATIENT)
Age: 48
End: 2023-01-10

## 2023-01-10 NOTE — TELEPHONE ENCOUNTER
New patient called to schedule colonoscopy  None prior  Patient does not have a preference in provider  Patient information was verified, no issues  Please call to schedule

## 2023-01-10 NOTE — LETTER
Bi Mai  [facility],[address] [place] [id number]    FLEXIBLE COLONOSCOPY INSTRUCTIONS  PLEASE NOTE    AS OF JUNE 1, 2014, OUR OFFICE REQUIRES 72 HOURS NOTICE OF CANCELLATION/RESCHEDULE OF A PROCEDURE TO AVOID INCURRING A MISSED APPOINTMENT FEE  Your Colonoscopy Procedure has been scheduled at:[facility]  [address] , [place], [address] [facility]     The Date of your Procedure is: March 6, 2023      The hospital facility will contact you the evening prior to your scheduled procedure with your arrival time  Use the bowel preparation as directed  Check with your family doctor if you are taking a blood thinner (Coumadin, Plavix, Xarelto, Pradaxa, Gingko biloba, Ginseng, Feverfew, St  Jarred's Wort)  We suggest stopping these for 3 days  Special instructions may be needed if you are taking aspirin or any aspirin-containing medication  Check with your family physician  If you are on DIABETIC MEDICATION (tablets or insulin) your doctor may make changes in your preparation  Take all medications usual unless otherwise instructed  As always, if you have any questions or concerns, feel free to call the office  Our  staff will be happy to connect you with one of the nurses to answer any questions or address any concerns you have regarding your procedure  Do not wear any jewelry the day of your procedure including wedding rings  Arrangements must be made for a responsible party to drive you home from the procedure  If you do not have a responsible family member or friend to drive you home, the procedure will not be done  Vast or a taxi is not acceptable  It is important you notify our office of any insurance changes prior to your procedure and, if necessary, supply us with referrals from your primary care physician          COLONOSCOPY PREPARATION INSTRUCTIONS    Purchase (prescription not required):  · 238 gram bottle of Miralax® (Glycolax®)  · 4 Dulcolax® (Bisacodyl) Laxative Tablets  · 64 oz  bottle of Gatorade® or your preference of a non-carbonated clear liquid - NOT RED OR PURPLE     One Day Prior to Colonoscopy Procedure  · Nothing to eat the day before your procedure, only clear liquids  · It is important that you drink plenty of clear liquids throughout the day to prevent dehydration  Clear Liquids include:  o Water/Iced Tea/Lemonade/Gatorade®/Black Coffee or tea (no milk or creamers  o Soft drinks: orange, ginger ale, cola, Pepsi®, Sprite®, 7Up®  o Scot-Aid® (lemonade or orange flavors only)  o Strained fruit juices without pulp such as apple, white grape, white cranberry  o Jell-O®, lemon, lime or orange (no fruit or toppings)  o Popsicles, [place] Ice (No Ice Cream, sherbets, or fruit bars)  o Chicken or beef bouillon/broth  DO NOT EAT OR DRINK ANYTHING RED OR PURPLE  DO NOT DRINK ANY ALCOHOLIC BEVERAGES  DIABETIC PATIENTS: Consult your physician    At 4:00 pm, take (2) Dulcolax® (Bisacodyl) Laxative Tablets  Swallow the tablets whole with an 8 oz  glass of water  At 8:00 pm, take the additional (2) Dulcolax® (Bisacodyl) Laxative Tablets with 8 oz  of water  The package may direct you not to exceed (2) tablets at any time but for the purpose of this examination, you should take (4) total     Mix the 238 gm of Miralax® in 64 oz  of Gatorade® and shake the solution until the Miralax® is dissolved  You will drink half (32 oz) of this solution this evening, beginning between 4 and 6 o'clock  Drink 8 oz glassfuls at your own pace  It may take several hours to drink the solution  Remember to stay close to toilet facilities  DAY OF COLONOSCOPY PROCEDURE    Five (5) hours before your procedure, drink the other half (32 oz) of the Miralax®/Gatorade® mixture within a two (2) hour period  This may require you to get up very early if you are scheduled for an early procedure  NOTHING IS TO BE TAKEN BY MOUTH 3 HOURS PRIOR TO PROCEDURE       If you use an inhaler, please bring it with you to your procedure

## 2023-01-16 ENCOUNTER — OFFICE VISIT (OUTPATIENT)
Dept: DIABETES SERVICES | Facility: CLINIC | Age: 48
End: 2023-01-16

## 2023-01-16 VITALS — WEIGHT: 270 LBS | BODY MASS INDEX: 36.62 KG/M2

## 2023-01-16 DIAGNOSIS — E11.9 DIABETES MELLITUS WITHOUT COMPLICATION (HCC): Primary | ICD-10-CM

## 2023-01-16 NOTE — PROGRESS NOTES
Type 2 Diabetes Class Assessment    HPI: Met with William Virgen for DSME Initial visit  Luis Hein has Type 2 Diabetes  Diabetes Assessment  Visit Type: Initial visit  Present at Session: Patient   Medical Diagnosis/ICD 10: E11 9  Special Learning Needs: No  Barriers to Learning: No barriers    How do you learn best? Combination of visual, reading and tactile  What are you most interested in learning about regards to your diabetes? "Nutrition"  How do you feel about making lifestyle changes at this time? Good  How would you rate your current knowledge of diabetes? Fair  How confident are you that will be able to take better control of your diabetes?: Good    How long have you had diabetes? Diagnosed ~1 month  Have you had diabetes education in the past?: No  Do you have any family members with diabetes?: His dad and his grandmother (unsure of which type of diabetes they had)  Do you monitor your blood sugar? No  Type of blood sugar monitor: N/A  How old is your meter?: N/A  How often do you test your blood sugars?: N/A  Do you keep a written record of your blood sugars?  N/A  Blood sugar log with patient today and reviewed by educator?: N/A  Blood Sugar ranges:    Fasting: N/A   Before meals: N/A   2 hours after meals: N/A  Any financial concerns pertaining to your diabetes supplies, medication or care?: No  Have you ever experienced hypoglycemia?: No  Have you ever been hospitalized or gone to the ER due to your blood sugars?: No  How do you treat low blood sugars?: Used to treat his dad's low blood sugars with elkins syrup  How do you treat high blood sugars?: Does not know  Do you wear a Diabetes I D ?: No  Where do you dispose of your sharps (needles,lancetes)?: N/A    Lab Results   Component Value Date    HGBA1C 6 5 (H) 12/06/2022    HGBA1C 5 9 (H) 06/19/2020    HGBA1C 6 1 05/23/2018     Lab Results   Component Value Date    CHOL 138 08/13/2014     Lab Results   Component Value Date    HDL 18 (L) 12/06/2022 HDL 22 (L) 06/19/2020    HDL 24 (L) 05/23/2018     Lab Results   Component Value Date    LDLCALC 126 (H) 12/06/2022    LDLCALC 142 (H) 06/19/2020    LDLCALC 118 (H) 05/23/2018     Lab Results   Component Value Date    TRIG 203 (H) 12/06/2022    TRIG 177 (H) 06/19/2020    TRIG 164 (H) 05/23/2018     No results found for: CHOLHDL  No results found for: [place]    Body mass index is 36 62 kg/m²  Ht Readings from Last 1 Encounters:   10/28/21 6' (1 829 m)     Wt Readings from Last 1 Encounters:   01/16/23 122 kg (270 lb)     Weight Change: No - states weight has been stable  Diet Assessment    Do you follow any special diet presently?: No  Who cooks at home?: His girlfriend  Who does the grocery shopping?: His girlfriend    How frequently do you eat out?: 3 meals per week    Activity Assessment    Exercise: No regular physical activity at present  He is on his feet a lot at work      Lifestyle/Social Assessment    Racial/ethnic group:                                       Primary Language: English  Marital Status: Single  Education Level: GED  Work status: Full Time  Type of job and hours: 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, 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 where he works 38 hours in addition to working at his store  Who lives in your household?: Lives alone  Who is you primary support person(s): His girlfriend  Describe your quality of life currently?: Good  Any concerns for your safety?: No  Any Rastafarian or cultural practices that may affect your diabetes care: No  Do you have a decrease or loss of hearing?: No  Do you have a decrease or loss of vision?: Reports having difficulty seeing near at times  When was the last time you had an ophthalmology exam?: Had corrective eye surgery in 2006 or 2007, states he went for his last check up 2 years ago   When was the last time you had dental exam?: Not regularly - has full upper denture and only has about half of teeth on the bottom  Do you check your feet for cracks, sores, debris?: No  When was the last time you had podiatry or foot exam?: Unknown  Last flu shot?: None  Pneumonia shot?: None    The patient's history was reviewed and updated as appropriate: allergies, current medications  Intervention    Diabetes Overview: Navneet Michel was instructed on basic concepts of diabetes, including identifying role of diabetes self management, basic pathophysiology and types of diabetes, A1c and blood sugar targets  Navneet Michel has good understanding of material covered  Taking Medications: Navneet Michel is not currently taking any diabetes medications  Monitoring Blood Sugars  Patient is not currently checking his blood sugar at home  Testing frequency: Encouraged pair testing  Test sugars before a meal and 2hr after the same meal, rotating between breakfast, lunch, and dinner  Test sugars twice a day (3 days a week, 7 days a week)  Goal Blood Sugars:   Premeal , even better <110  2hr after a meal <180, even better <140  A1C <7%, even better <6 5%  Hypoglycemia: Instructed patient on definition/risk of hypoglycemia, treatment, causes/symptoms, when to notify provider of lows, prevention of hypoglycemia and exercise precautions  Comments: Jasen [place] understanding of hypoglycemia concepts      Physical Activity: Discussed benefits of physical activity to optimize blood glucose control, encouraged activity at patient is physically able   Always consult a physician prior to starting an exercise program   Comments: Jasen [place] understanding of hypoglycemia concepts      Diabetes Education Record    Navneet Michel received the following handouts: Class assessment folder - "Diabetes Guidelines", sharps disposal, eye care, foot care, hyperglycemia/hypoglycemia handout, 15/15 rule & "Know Your Numbers" handout    Patient response to instruction    Comprehension: good  Motivation: good  Expected Compliance: good  Response to Teachback: 100%, demonstrated understanding    Start- Stop: 8:15-9:00  Total Minutes: 45 Minutes  Group or Individual Instruction: DSME-I  Other: Dread Medina, DO    Thank you for referring your patient to [address] [facility], it was a pleasure working with them today  Please feel free to call with any questions or concerns      Michael Fuentes [address], [address] [place] [address], [address] [id number]

## 2023-01-20 NOTE — TELEPHONE ENCOUNTER
Additional Information  • Negative: Is this a new patient under the age of 48? • Negative: Is this a patient over the age of 76 that has never had a colonoscopy? • Negative: Is this patient over the age of [de-identified] with a history of cancer and/or polyps? • Negative: Has the patient had a colonoscopy within the last year and when was it? • Negative: Does the patient have a family history of colon or rectal cancer? • Affirmative: Does the patient experience chest pain or shortness of breath when climbing stairs? • Negative: Does the patient require oxygen support? • Negative: Has the patient had a cardiac procedure within the last year? • Negative: Has the patient had a stroke or blood clots? • Negative: Is the patient currently on any blood thinners such as Coumadin, Plavix, Eliquis, Pradaxa, Xarelto, etc?  (Check the patient's current medication list and document reason for taking medication)  • Negative: Has the patient had a knee or hip replacement within the last 6 months that required antibiotic coverage prior to the procedure? • Negative: Advised Patient - Initial Assessment  1  Patient advised that our office requires 72 hours notice for a cancellation of a procedure to avoid incurring a missed appointment fee  2  Asked patient to please contact insurance company to ask how they will be processing the individual claim for the procedure  3  Advised patient that he is welcome to see the doctor in the office prior to the procedure  4  Advised patient to be at the location of the procedure at 30 minutes prior to the scheduled time      Protocols used: [facility] COLONOSCOPY SCHEDULING

## 2023-01-23 ENCOUNTER — HOSPITAL ENCOUNTER (OUTPATIENT)
Dept: RADIOLOGY | Facility: HOSPITAL | Age: 48
Discharge: HOME | End: 2023-01-23

## 2023-01-23 ENCOUNTER — OFFICE VISIT (OUTPATIENT)
Dept: OBGYN CLINIC | Facility: CLINIC | Age: 48
End: 2023-01-23

## 2023-01-23 VITALS
DIASTOLIC BLOOD PRESSURE: 95 MMHG | WEIGHT: 266.6 LBS | OXYGEN SATURATION: 97 % | BODY MASS INDEX: 36.11 KG/M2 | HEIGHT: 72 IN | SYSTOLIC BLOOD PRESSURE: 145 MMHG | HEART RATE: 86 BPM

## 2023-01-23 DIAGNOSIS — M79.602 LEFT ARM PAIN: Primary | ICD-10-CM

## 2023-01-23 DIAGNOSIS — M79.602 LEFT ARM PAIN: ICD-10-CM

## 2023-01-23 DIAGNOSIS — R29.898 SHOULDER WEAKNESS: ICD-10-CM

## 2023-01-23 NOTE — PROGRESS NOTES
Assessment/Plan   Diagnoses and all orders for this visit:    Left shoulder pain    Shoulder weakness  - Concern for acute rotator cuff tear  - MRI  - Start PT  - Ice as needed  - Follow up with Dr Zaida Anderson          Subjective   Patient ID: Gloria Gitelman is a 50 y o  male  Vitals:    01/23/23 1352   BP: 145/95   Pulse: 86   SpO2: 97%     48yo male comes in for an evaluation of his right arm  He was injured last night when he fell backwards onto his outstretched arm  He reports his arm was jammed upwards into his shoulder  Since then, he has been having pain and weakness in the shoulder  Most of the pain is in the lateral deltoid area  The pain is dull in character, moderate in severity, pain does not radiate and is not associated with numbness  He is also seeing his chiropractor for issues with his neck and left arm which are chronic          The following portions of the patient's history were reviewed and updated as appropriate: allergies, current medications, past family history, past medical history, past social history, past surgical history and problem list     Review of Systems  Ortho Exam  Past Medical History:   Diagnosis Date   • Anesthesia     "woke up during procedure"     Past Surgical History:   Procedure Laterality Date   • EYE SURGERY      lasik   • FOOT SURGERY Bilateral     hammer toe   • FOREARM SURGERY Right    • HAND SURGERY Left    • MS NEUROPLASTY &/TRANSPOS MEDIAN NRV CARPAL TUNNE Left 8/20/2019    Procedure: CARPAL TUNNEL RELEASE;  Surgeon: Richi Fried MD;  Location: [facility];  Service: Orthopedics   • MS NEUROPLASTY &/TRANSPOS MEDIAN NRV CARPAL Murphyryan Miller Right 12/4/2020    Procedure: RELEASE CARPAL TUNNEL;  Surgeon: Richi Fried MD;  Location:  MAIN OR;  Service: Orthopedics   • SHOULDER SURGERY Right     x2     Family History   Problem Relation Age of Onset   • Diabetes Father    • Diabetes Paternal Grandmother      Social History     Occupational History   • Not on file   Tobacco Use   • Smoking status: Every Day     Packs/day: 1 50     Types: Cigarettes   • Smokeless tobacco: Never   • Tobacco comments:     last cigarette at 0644   Vaping Use   • Vaping Use: Never used   Substance and Sexual Activity   • Alcohol use: Yes     Alcohol/week: 4 0 standard drinks     Types: 4 Cans of beer per week   • Drug use: Never   • Sexual activity: Not on file       Review of Systems   Constitutional: Negative  HENT: Negative  Eyes: Negative  Respiratory: Negative  Cardiovascular: Negative  Gastrointestinal: Negative  Endocrine: Negative  Genitourinary: Negative  Musculoskeletal: As below      Allergic/Immunologic: Negative  Neurological: Negative  Hematological: Negative  Psychiatric/Behavioral: Negative  Objective   Physical Exam    Xray  3 views of the right shoulder and 2 of the humerus were done in the office today  I have personally reviewed pertinent films in PACS and my interpretation is no acute displaced fracture  Radiology reading pending  · Constitutional: Awake, Alert, Oriented  · Eyes: EOMI  · Psych: Mood and affect appropriate  · Heart: regular rate   · Lungs: No audible wheezing  · Abdomen: No guarding  · Lymph: no lymphedema      • right Elbow:  - Appearance  • No swelling, discoloration, deformity, or ecchymosis  - Palpation  • "No tenderness to palpation of the medial / lateral epicondyles, olecranon, radial head, or anterior elbow  - ROM  • Extension: 0 and Flexion: 130        •   left Shoulder:  - Appearance  • No rash, erythema, or ecchymosis  - Palpation  • No tenderness to palpation of the clavicle, AC joint, biceps tendon, scapula, or proximal humerus   - ROM  • Flexion: active 0, passive 90  ER 80  IR iliac crest   Pain with attempted passive motion  - Motor  • 3/5 flexion    4/5 ER and IR  - Special tests  • + drop arm     - NVI distally

## 2023-02-01 ENCOUNTER — EVALUATION (OUTPATIENT)
Dept: PHYSICAL THERAPY | Facility: CLINIC | Age: 48
End: 2023-02-01

## 2023-02-01 DIAGNOSIS — R29.898 SHOULDER WEAKNESS: ICD-10-CM

## 2023-02-01 DIAGNOSIS — M79.602 LEFT ARM PAIN: Primary | ICD-10-CM

## 2023-02-01 NOTE — PROGRESS NOTES
PT Evaluation     Today's date: 2023  Patient name: Bi Mai  : 1975  MRN: [id number]  Referring provider: Meade Crigler  Dx:   Encounter Diagnosis     ICD-10-CM    1  Left arm pain  M79 602 Ambulatory Referral to Physical Therapy      2  Shoulder weakness  R29 [id number] Ambulatory Referral to Physical Therapy                     Assessment  Assessment details: Pt presents with signs and symptoms synonymous of admitting diagnosis as well as possible mechanical diagnosis of shoulder Flexion derangement with DP of IR  There is a history of neck and postural component that should not be ignored and will continue to be addressed with further visits as this can assist with a independent shoulder injury  Pt presents with pain, decreased strength, decreased range, flexibility, as well as tolerance to activity and postural awareness  Pt would benefit skilled PT intervention in order to address these impairments in order to be able to perform all desired activities with minimal to nil symptom exacerbation  If they fail to find improvement over the next 3-4 weeks, he is likely to follow up with family up with Dr Varun Archibald in the near future  Thank you very much for this kind and motivated referral      Impairments: abnormal or restricted ROM, activity intolerance, impaired physical strength, lacks appropriate home exercise program, pain with function, poor posture  and poor body mechanics  Understanding of Dx/Px/POC: good   Prognosis: good    Goals  STG 4 Weeks:  Decrease pain at worst to 5  Improve range to improve all planes by 10 from IE  Improve strength to be able to assess strength  Independent with HEP  LTG 8 Weeks:  Decrease pain at worst to 3  Improve range to improve all planes by 10 from   Improve strength to 4/5 or greater     Able to perform all desired activities with minimal to nil symptom exacerbation      Plan  Patient would benefit from: skilled physical therapy  Planned modality interventions: cryotherapy, TENS and thermotherapy: hydrocollator packs  Planned therapy interventions: joint mobilization, manual therapy, activity modification, neuromuscular re-education, patient education, postural training, strengthening, stretching, therapeutic activities, therapeutic exercise, home exercise program, graded motor, graded exercise, graded activity, functional ROM exercises, flexibility, behavior modification and body mechanics training  Frequency: 2x week  Duration in weeks: 10        Subjective Evaluation    History of Present Illness  Date of onset: 2/1/2023  Mechanism of injury: 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 and presents today stating that he hurt his L shoulder on 1/22/23 when he slipped and caught him self on a set of stairs the arm jammed into his body while using rails  Had immediate pain  throught the middle aspect of his arm, nothing in the back or on the side  Pt reports he does have a history of neck pain with R UE symptoms that he has been treated by Chiropractor for which he feels as though at this time has had plateau  Pt reports he went to the ortho office the following day, x-ray performed (-) of abnormality, will be having MRI performed 2/7/23  Pt met with the PA who indicated concerns for [facility] and formal follow up with Dr Maryellen Vidal will occur after further imagery is performed  Pt reports that prior to this he was without any hindrance on L shoulder  Pt reports currently there are periods of time without pain, and with increased irritation can be 7/10  Pt reports his vocational demands can be active and labor intensive, but there is also resting and periods of paper work  Pt reports the L shoulder in tandem with his R shoulder history sleeping is very challenging to be comfortable as well as stay asleep  Denies any numbness into L hand, he does have R sided numbness from his neck irritation that occurred 2 years ago, went away, and it recently returned 3 weeks ago  Denies HA's  Pt denies change in bowel or bladder  Most challenging activities are reaching, SL, and lifting  Pt reports goals at this time are to reduce pain, improve movement, improve functional use of the shoulder  Quality of life: good    Pain  Current pain rating: 3  At best pain ratin  At worst pain ratin  Quality: dull ache, tight, sharp and pressure  Relieving factors: heat, ice, medications and rest  Aggravating factors: overhead activity and lifting  Progression: improved      Diagnostic Tests  X-ray: normal  Treatments  Previous treatment: medication  Patient Goals  Patient goals for therapy: increased strength, return to sport/leisure activities, increased motion and decreased pain          Objective     Active Range of Motion   Left Shoulder   Flexion: 65 degrees   Abduction: 30 degrees   External rotation BTH: C2   Internal rotation BTB: sacrum     Right Shoulder   Flexion: WFL  Abduction: WFL  External rotation BTH: WFL  Internal rotation BTB: WFL    Additional Active Range of Motion Details  Forward head, rounded shoulders  B/L Sensation intact to light touch C3,4,5,6,7,8,T1,T2  DTR Bi Tri Brac all 1+ Ruiz (-) B  Postural correction R arm digits 1-4 , L shoulder  (NE)    UE Screen  L Flex Abd ER IR  All <3_)  R Flex 5 abd 4 ER 4- IR 5  Elbow:  L 4-* flex, ext 5/5,  strong and painles  R strong and painless with  too  CS Repeated motion   Flex WFL + Pro  Retraction Mod  Ext Max*  SB Rot R Mod*  SB Rot L WFL  Joint Mobility  Palpation  Sts  + NLTT R > L  Reaching Flex/Abd pain    Repeated motions UE  (P NW R UE)  L shoulder  Ext P NW  Ext + IR P NW  IR P NW  Improve IR range, improved IR Range, mild improvement with flexion  CS Ret P NW on R UE , NE on L UE    CS Ret + Ext too painfull*           Flowsheet Rows    Flowsheet Row Most Recent Value   PT/OT G-Codes    Current Score 47   Projected Score 70             Precautions: Possible DM2, Possible Latex skin sensitivity hx  Manuals 2/1                                                                Neuro Re-Ed             Postural Ed an Progression 10 min                                                                                          Ther Ex             Shoulder Ext NB x 20            Shoulder Ext with IR  NB x 20            Shoulder IR B 4 x 10            Progression?                           (if shoulder dysfunction table slides?)             CS Ret P NW 5 x 5            Progression? Ther Activity             Flex and IR shoulder range B IR, Mild better Flex            Monitor R UE symptoms             Strength assessment (elbow flex primary focus B                                      Modalities             CP/HP to L shoulder prn                                MDT Key:  ANR: Adherent Nerve root  P: Produce  B: Better  NB: No Better  W: Worse  NW: No worse  NE: No Effect    D: Decrease  I: Increase  A: Abolish  R/Rep: Repeated  EIS: Ext in Standing  EIL: Ext in Lying  FIS: Flex in standing  FISit: Flex in sitting  SGIS: Side Glide in Standing  SGIP: Side Glide in Prone   Pro: Protrusion  Ret: Retraction  SB: Side Bend  Rot: Rotation  Ext: Extension   PE'd: peripheralized  Pe'g: Peripheralizing  CE'd: centralized  Ce'g: Centralizing

## 2023-02-07 ENCOUNTER — HOSPITAL ENCOUNTER (OUTPATIENT)
Dept: MRI IMAGING | Facility: HOSPITAL | Age: 48
Discharge: HOME | End: 2023-02-07

## 2023-02-07 DIAGNOSIS — R29.898 SHOULDER WEAKNESS: ICD-10-CM

## 2023-02-07 DIAGNOSIS — M79.602 LEFT ARM PAIN: ICD-10-CM

## 2023-02-09 ENCOUNTER — OFFICE VISIT (OUTPATIENT)
Dept: PHYSICAL THERAPY | Facility: CLINIC | Age: 48
End: 2023-02-09

## 2023-02-09 DIAGNOSIS — M79.602 LEFT ARM PAIN: Primary | ICD-10-CM

## 2023-02-09 DIAGNOSIS — R29.898 SHOULDER WEAKNESS: ICD-10-CM

## 2023-02-09 NOTE — PROGRESS NOTES
Daily Note     Today's date: 2023  Patient name: Debbie Brice  : 1975  MRN: [id number]  Referring provider: Calvin Alvarez  Dx:   Encounter Diagnosis     ICD-10-CM    1  Left arm pain  M79 602       2  Shoulder weakness  R29 898                      Subjective: Pt presents today stating he is doing okay  He had an MRI performed which indicated RC tear and is meeting with Dr Desiree Rider on 2/15/23  Pt reports he has been challenged to perform CS retraction because he does not have a chair to sit in, but the shoulder exercises he is doing 10x's a day  Pt reports he has been busy at work and does work along moving things around and this causes issue on his shoulder  Objective: See treatment diary below  Still having R sided neck symptoms  L shoulder is having some pain in the bicep at the moment  Assessment:  Today's session was proceeded with HEP education and encouragement to follow through with this  Educated about table slides for ROM, pt in accord  Continue to progress as able  Precautions: Possible DM2, Possible Latex skin sensitivity hx   [facility] [employer]  Access Code: [email or web address]         Manuals            PT AAROM Flex  3 x 5                                                  Neuro Re-Ed             Postural Ed an Progression 10 min 10 min                                                                                         Ther Ex             Shoulder Ext NB x 20            Shoulder Ext with IR  NB x 20            Shoulder IR B 4 x 10 5 x 10 B           Progression? Table Slide Flex/Abd/ER  3" x 10           CS Ret P NW 5 x 5 P NW 4 x 5           Progression?              Ther Activity             Flex and IR shoulder range B IR, Mild better Flex B           Monitor R UE symptoms             Strength assessment (elbow flex primary focus B B                                     Modalities             CP/HP to L shoulder prn  dec

## 2023-02-14 ENCOUNTER — OFFICE VISIT (OUTPATIENT)
Dept: PHYSICAL THERAPY | Facility: CLINIC | Age: 48
End: 2023-02-14

## 2023-02-14 DIAGNOSIS — M79.602 LEFT ARM PAIN: Primary | ICD-10-CM

## 2023-02-14 DIAGNOSIS — R29.898 SHOULDER WEAKNESS: ICD-10-CM

## 2023-02-14 NOTE — PROGRESS NOTES
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  Pt reports that his neck is doing today  Objective: See treatment diary below  R sided neck symptoms  L shoulder is having some pain in the bicep at the moment  Assessment:  Able to proceed with new AAROM activities without symptoms exacerbation  Follows up with Dr Edna Hankins tomorrow, follow up with pt in regards of this presentation and what future plans may be, this occurs on 23  Pt in accord  Precautions: Possible DM2, Possible Latex skin sensitivity hx   [facility] [employer]  Access Code: [email or web address]         Manuals           PT AAROM Flex  3 x 5 4 x 5                                                 Neuro Re-Ed             Postural Ed an Progression 10 min 10 min 5 min          Wand Flexion and Abd AAROM   2 x 5                                                                           Ther Ex             Shoulder Ext NB x 20            Shoulder Ext with IR  NB x 20            Shoulder IR B 4 x 10 5 x 10 B 4 x 10          Progression? Table Slide Flex/Abd/ER  3" x 10 3" x 10          CS Ret P NW 5 x 5 P NW 4 x 5 P NW 4 x 5          Progression?              Ther Activity             Flex and IR shoulder range B IR, Mild better Flex B           Monitor R UE symptoms             Strength assessment (elbow flex primary focus B B                                     Modalities             CP/HP to L shoulder prn  dec

## 2023-02-15 ENCOUNTER — HOSPITAL ENCOUNTER (OUTPATIENT)
Dept: RADIOLOGY | Facility: HOSPITAL | Age: 48
Discharge: HOME | End: 2023-02-15

## 2023-02-15 ENCOUNTER — OFFICE VISIT (OUTPATIENT)
Dept: OBGYN CLINIC | Facility: CLINIC | Age: 48
End: 2023-02-15

## 2023-02-15 VITALS
HEIGHT: 72 IN | WEIGHT: 260.4 LBS | HEART RATE: 74 BPM | DIASTOLIC BLOOD PRESSURE: 83 MMHG | BODY MASS INDEX: 35.27 KG/M2 | SYSTOLIC BLOOD PRESSURE: 140 MMHG

## 2023-02-15 DIAGNOSIS — S46.012A TRAUMATIC COMPLETE TEAR OF LEFT ROTATOR CUFF, INITIAL ENCOUNTER: Primary | ICD-10-CM

## 2023-02-15 DIAGNOSIS — S43.003A SUBLUXATION OF TENDON OF LONG HEAD OF BICEPS: ICD-10-CM

## 2023-02-15 DIAGNOSIS — S46.012A TRAUMATIC COMPLETE TEAR OF LEFT ROTATOR CUFF, INITIAL ENCOUNTER: ICD-10-CM

## 2023-02-15 DIAGNOSIS — F17.200 SMOKING: ICD-10-CM

## 2023-02-15 RX ORDER — CHLORHEXIDINE GLUCONATE 4 G/100ML
SOLUTION TOPICAL DAILY PRN
OUTPATIENT
Start: 2023-02-15

## 2023-02-15 RX ORDER — NICOTINE 21 MG/24HR
1 PATCH, TRANSDERMAL 24 HOURS TRANSDERMAL EVERY 24 HOURS
Qty: 28 PATCH | Refills: 0 | Status: SHIPPED | OUTPATIENT
Start: 2023-02-15

## 2023-02-15 RX ORDER — CHLORHEXIDINE GLUCONATE 0.12 MG/ML
15 RINSE ORAL ONCE
OUTPATIENT
Start: 2023-02-15 | End: 2023-02-15

## 2023-02-15 NOTE — PROGRESS NOTES
[address] [facility] [address]  [phone]       Amanda Gagnon  [id number]  1975    ORTHOPAEDIC SURGERY OUTPATIENT NOTE  2/15/2023      HISTORY:  50 y o  male  Presents for initial evaluation for his left shoulder  1 month ago he had a fall down steps and injured his shoulder  He was seen by [facility] and had an XR and MRI ordered  He did start PT and has improved somewhat on motion  He reports pain with sleeping and at night  Past Medical History:   Diagnosis Date   • Anesthesia     "woke up during procedure"       Past Surgical History:   Procedure Laterality Date   • EYE SURGERY      lasik   • FOOT SURGERY Bilateral     hammer toe   • FOREARM SURGERY Right    • HAND SURGERY Left    • MD NEUROPLASTY &/TRANSPOS MEDIAN NRV CARPAL TUNNE Left 8/20/2019    Procedure: CARPAL TUNNEL RELEASE;  Surgeon: Warden Harmeet MD;  Location: [facility]  MAIN OR;  Service: Orthopedics   • MD NEUROPLASTY &/TRANSPOS MEDIAN NRV CARPAL Koki Beards Right 12/4/2020    Procedure: RELEASE CARPAL TUNNEL;  Surgeon: Warden Harmeet MD;  Location:  MAIN OR;  Service: Orthopedics   • SHOULDER SURGERY Right     x2       Social History     Socioeconomic History   • Marital status: Single     Spouse name: Not on file   • Number of children: Not on file   • Years of education: Not on file   • Highest education level: Not on file   Occupational History   • Not on file   Tobacco Use   • Smoking status: Every Day     Packs/day: 1 50     Types: Cigarettes   • Smokeless tobacco: Never   • Tobacco comments:     last cigarette at 0644   Vaping Use   • Vaping Use: Never used   Substance and Sexual Activity   • Alcohol use:  Yes     Alcohol/week: 4 0 standard drinks     Types: 4 Cans of beer per week   • Drug use: Never   • Sexual activity: Not on file   Other Topics Concern   • Not on file   Social History Narrative   • Not on file     Social Determinants of Health     Financial Resource Strain: Not on file   Food Insecurity: Not on file   Transportation Needs: Not on file   Physical Activity: Not on file   Stress: Not on file   Social Connections: Not on file   Intimate Partner Violence: Not on file   Housing Stability: Not on file       Family History   Problem Relation Age of Onset   • Diabetes Father    • Diabetes Paternal Grandmother         Patient's Medications   New Prescriptions    NICOTINE (NICODERM CQ) 21 MG/24 HR TD 24 HR PATCH    Place 1 patch on the skin over 24 hours every 24 hours   Previous Medications    DIPHENHYDRAMINE-ACETAMINOPHEN (TYLENOL PM EXTRA STRENGTH)  MG TABS    Take 1 tablet by mouth daily at bedtime as needed for sleep    MELOXICAM (MOBIC) 15 MG TABLET    Take 1 tablet (15 mg total) by mouth daily With food   Modified Medications    No medications on file   Discontinued Medications    No medications on file       Allergies   Allergen Reactions   • Adhesive [Medical Tape] Hives        /83 (BP Location: Right arm, Patient Position: Sitting, Cuff Size: Adult)   Pulse 74   Ht 6' (1 829 m) Comment: verbal  Wt 118 kg (260 lb 6 4 oz)   BMI 35 32 kg/m²      REVIEW OF SYSTEMS:  Constitutional: Negative  HEENT: Negative  Respiratory: Negative  Skin: Negative  Neurological: Negative  Psychiatric/Behavioral: Negative  Musculoskeletal: Negative except for that mentioned in the HPI      PHYSICAL EXAM:     /83 (BP Location: Right arm, Patient Position: Sitting, Cuff Size: Adult)   Pulse 74   Ht 6' (1 829 m) Comment: verbal  Wt 118 kg (260 lb 6 4 oz)   BMI 35 32 kg/m²   Gen: No acute distress, resting comfortably in bed  HEENT: Eyes clear, moist mucus membranes, hearing intact  Respiratory: No audible wheezing or stridor  Cardiovascular: Well Perfused peripherally, 2+ distal pulse  Abdomen: nondistended, no peritoneal signs    LEFT SHOULDER:     NVI axillary/medial/radial/ulnar and sensory intact     Forward flexion:   90 degrees   Abduction:  80 degrees   External rotation at 90 degrees abduction:   90 degrees   Internal rotation at 90 degrees abduction:  90 degrees   External rotation at 0 degrees:   symmetric  Internal rotation: T12     STRENGTH:  Forward flexion:  4/5   Abduction:  4/5   External rotation:  /5   Internal rotation:  5/5        Speed test: positive  Yergason's: mildly positive  Tender to palpation ACJ (acromioclavicular joint): Negative   Tender to palpation LHB (long head of biceps): positive  Yates test: Negative  Chugach test: positive  Hornblower's: Negative  Lift off: Negative  Belly press: Negative  Bear hug: Negative  External lag sign: Negative  Cross-body adduction: Negative  Sulcus sign: Negative  Mone's test: Negative  Drop arm test: negative     RIGHT SHOULDER:     NVI axillary/medial/radial/ulnar and sensory intact     Forward flexion:   180 degrees   Abduction:  180 degrees   External rotation at 90 degrees abduction:  90 degrees   Internal rotation at 90 degrees abduction:   90 degrees   External rotation at 0 degrees:  70 degrees   Internal rotation: T7      STRENGTH:  Forward flexion:  5/5   Abduction:  5/5   External rotation:  5/5   Internal rotation:  5/5      Reflexes:  Brachioradialis:  Symmetric bilaterally  Triceps: Symmetric bilaterally  Biceps: Symmetric bilaterally  Patella tendon: Symmetric bilaterally  Achilles tendon: Symmetric bilaterally  Babinski's: Negative  Diego sign: Negative     No scapular winging or dyskinesis     Capillary refill brisk   Full ROM of elbows bilaterally      Skin:  No ecchymosis/abrasion/erythema/warmth     Cervical spine:   Full rotation, side bending, flexion and extension without radiating pain  Spurling's: Negative    IMAGING:  MRI of the left shoulder available, this was reviewed independently and demonstrates supraspinatus tearing, medial subluxation of the biceps tendon and tendinosis of subscapularis    ASSESSMENT AND PLAN: 50 y o  male  With left rotator cuff tear and medial subluxuation of the biceps after a traumatic fall  Discussed with the patient that given his exam findings and imaging findings recommend surgical intervention in the form of left shoulder arthroscopy, rotator cuff repair, biceps tenodesis and all necessary reconstructive procedures  Informed consent was signed  Postoperative course was discussed  Patient does smoke  We did discuss risk of delayed healing and infection due to his smoking  He will start on a nicotine patch and work on smoking cessation  The patient understands the risks and benefits of the procedure with risks including pain, stiffness, infection, neurovascular injury, recurrence of symptoms, failure of surgical procedure, inadvertent intraoperative complications, blood loss, blood clots, allergic reaction to anesthesia, stroke, heart attack, all up to and including to death  The patient understood and did consent for surgery today       Scribe Attestation      I,:  Perla Brumfield PA-C am acting as a scribe while in the presence of the attending physician :       I,:  Chaparro Diana personally performed the services described in this documentation    as scribed in my presence :

## 2023-02-20 ENCOUNTER — OFFICE VISIT (OUTPATIENT)
Dept: PHYSICAL THERAPY | Facility: CLINIC | Age: 48
End: 2023-02-20

## 2023-02-20 DIAGNOSIS — R29.898 SHOULDER WEAKNESS: ICD-10-CM

## 2023-02-20 DIAGNOSIS — M79.602 LEFT ARM PAIN: Primary | ICD-10-CM

## 2023-02-20 NOTE — PROGRESS NOTES
Daily Note     Today's date: 2023  Patient name: Melissa Boss  : 1975  MRN: [id number]  Referring provider: Bea Velez  Dx:   Encounter Diagnosis     ICD-10-CM    1  Left arm pain  M79 602       2  Shoulder weakness  R29 898                      Subjective: Pt presents today stating that he met with Dr James Vaughn and will be getting RC repair surgery on 23  RC repair and Bicep Tenodesis will be pre operative plan for pt  States that otherwise he is doing okay, has help to assist with the recover, states he is familiar with the process as he has had it 2x's on his R shoulder  Pt reports that his neck and R UE have been doing fairly well, occasional tingling in his R UE, and not having significant pain in the shoulder and neck, mild challenges with sleeping  Objective: See treatment diary below  Flex AROM 155  Abd AROM 100  ER AROM C6  IR AROM T10       Assessment:  Pt is improving range with L UE, and improving symptoms with R UE  Discussed surgical components and scheduling for follow up on 3/7/23, 1 week post operatively  Discussed protocol and healing  Pt in accord, due to high copay will hold all future appointments until he follows up post operatively  He is welcome to contact anytime before or after surgery  Precautions: Possible DM2, Possible Latex skin sensitivity hx   [facility]  Access Code: [email or web address]         Manuals 20         PT AAROM Flex  3 x 5 4 x 5 2 x 5                                                Neuro Re-Ed             Postural Ed an Progression 10 min 10 min 5 min 5 min         Wand Flexion and Abd AAROM   2 x 5 2 x 5         AROM Flex    2 x 5                                                             Ther Ex             Shoulder Ext NB x 20            Shoulder Ext with IR  NB x 20            Shoulder IR B 4 x 10 5 x 10 B 4 x 10          Progression?                            Table Slide Flex/Abd/ER  3" x 10 3" x 10 3" x 10 CS Ret P NW 5 x 5 P NW 4 x 5 P NW 4 x 5 P NW 4 x 5         Progression?              Ther Activity             Flex and IR shoulder range B IR, Mild better Flex B           Monitor R UE symptoms             Strength assessment (elbow flex primary focus B B                                     Modalities             CP/HP to L shoulder prn  dec

## 2023-02-21 DIAGNOSIS — Z91.89 AT RISK FOR OBSTRUCTIVE SLEEP APNEA: Primary | ICD-10-CM

## 2023-02-21 NOTE — PRE-PROCEDURE INSTRUCTIONS
Pre-Surgery Instructions:   Medication Instructions   • nicotine (NICODERM CQ) 21 mg/24 hr TD 24 hr patch Hold day of surgery  My Surgical Experience    The following information was developed to assist you to prepare for your operation  What do I need to do before coming to the hospital?  • Arrange for a responsible person to drive you to and from the hospital   • Arrange care for your children at home  Children are not allowed in the recovery areas of the hospital  • Plan to wear clothing that is easy to put on and take off  If you are having shoulder surgery, wear a shirt that buttons or zippers in the front  Bathing  o Shower the evening before and the morning of your surgery with an antibacterial soap  Please refer to the Pre Op Showering Instructions for Surgery Patients Sheet   o Remove nail polish and all body piercing jewelry  o Do not shave any body part for at least 24 hours before surgery-this includes face, arms, legs and upper body  Food  o Nothing to eat or drink after midnight the night before your surgery  This includes candy and chewing gum  o Exception: If your surgery is after 12:00pm (noon), you may have clear liquids such as 7-Up®, ginger ale, apple or cranberry juice, Jell-O®, water, or clear broth until 8:00 am  o Do not drink milk or juice with pulp on the morning before surgery  o Do not drink alcohol 24 hours before surgery  Medicine  o Follow instructions you received from your surgeon about which medicines you may take on the day of surgery  o If instructed to take medicine on the morning of surgery, take pills with just a small sip of water  Call your prescribing doctor for specific infroamtion on what to do if you take insulin    What should I bring to the hospital?    Bring:  • Crutches or a walker, if you have them, for foot or knee surgery  • A list of the daily medicines, vitamins, minerals, herbals and nutritional supplements you take   Include the dosages of medicines and the time you take them each day  • Glasses, dentures or hearing aids  • Minimal clothing; you will be wearing hospital sleepwear  • Photo ID; required to verify your identity  • If you have a Living Will or Power of , bring a copy of the documents  • If you have an ostomy, bring an extra pouch and any supplies you use    Do not bring  • Medicines or inhalers  • Money, valuables or jewelry    What other information should I know about the day of surgery? • Notify your surgeons if you develop a cold, sore throat, cough, fever, rash or any other illness  • Report to the Ambulatory Surgical/Same Day Surgery Unit  • You will be instructed to stop at Registration only if you have not been pre-registered  • Inform your  fi they do not stay that they will be asked by the staff to leave a phone number where they can be reached  • Be available to be reached before surgery  In the event the operating room schedule changes, you may be asked to come in earlier or later than expected    *It is important to tell your doctor and others involved in your health care if you are taking or have been taking any non-prescription drugs, vitamins, minerals, herbals or other nutritional supplements   Any of these may interact with some food or medicines and cause a reaction

## 2023-02-22 ENCOUNTER — ANESTHESIA EVENT (OUTPATIENT)
Dept: PERIOP | Facility: HOSPITAL | Age: 48
End: 2023-02-22

## 2023-02-23 ENCOUNTER — APPOINTMENT (OUTPATIENT)
Dept: PHYSICAL THERAPY | Facility: CLINIC | Age: 48
End: 2023-02-23

## 2023-02-27 ENCOUNTER — APPOINTMENT (OUTPATIENT)
Dept: PHYSICAL THERAPY | Facility: CLINIC | Age: 48
End: 2023-02-27

## 2023-02-27 ENCOUNTER — HOSPITAL ENCOUNTER (OUTPATIENT)
Facility: HOSPITAL | Age: 48
Setting detail: OUTPATIENT SURGERY
Discharge: HOME | End: 2023-02-27
Attending: ORTHOPAEDIC SURGERY | Admitting: ORTHOPAEDIC SURGERY

## 2023-02-27 ENCOUNTER — ANESTHESIA (OUTPATIENT)
Dept: PERIOP | Facility: HOSPITAL | Age: 48
End: 2023-02-27

## 2023-02-27 VITALS
BODY MASS INDEX: 34 KG/M2 | DIASTOLIC BLOOD PRESSURE: 77 MMHG | RESPIRATION RATE: 18 BRPM | OXYGEN SATURATION: 94 % | SYSTOLIC BLOOD PRESSURE: 126 MMHG | WEIGHT: 251 LBS | TEMPERATURE: 97.6 F | HEART RATE: 70 BPM | HEIGHT: 72 IN

## 2023-02-27 DIAGNOSIS — S46.012A TRAUMATIC ROTATOR CUFF TEAR, LEFT, INITIAL ENCOUNTER: Primary | ICD-10-CM

## 2023-02-27 DEVICE — KL FBTK RC,(BLK/BLU)FT & (BLU/BLK)#2 MTS
Type: IMPLANTABLE DEVICE | Site: SHOULDER | Status: FUNCTIONAL
Brand: ARTHREX®

## 2023-02-27 DEVICE — 4.75MM BC KNOTLESS SWIVELOCK
Type: IMPLANTABLE DEVICE | Site: SHOULDER | Status: FUNCTIONAL
Brand: ARTHREX®

## 2023-02-27 RX ORDER — BUPIVACAINE HYDROCHLORIDE 5 MG/ML
INJECTION, SOLUTION EPIDURAL; INTRACAUDAL AS NEEDED
Status: DISCONTINUED | OUTPATIENT
Start: 2023-02-27 | End: 2023-02-27

## 2023-02-27 RX ORDER — CEFAZOLIN SODIUM 2 G/50ML
2000 SOLUTION INTRAVENOUS ONCE
Status: COMPLETED | OUTPATIENT
Start: 2023-02-27 | End: 2023-02-27

## 2023-02-27 RX ORDER — MAGNESIUM HYDROXIDE 1200 MG/15ML
LIQUID ORAL AS NEEDED
Status: DISCONTINUED | OUTPATIENT
Start: 2023-02-27 | End: 2023-02-27 | Stop reason: HOSPADM

## 2023-02-27 RX ORDER — CHLORHEXIDINE GLUCONATE 0.12 MG/ML
15 RINSE ORAL ONCE
Status: COMPLETED | OUTPATIENT
Start: 2023-02-27 | End: 2023-02-27

## 2023-02-27 RX ORDER — MIDAZOLAM HYDROCHLORIDE 2 MG/2ML
INJECTION, SOLUTION INTRAMUSCULAR; INTRAVENOUS AS NEEDED
Status: DISCONTINUED | OUTPATIENT
Start: 2023-02-27 | End: 2023-02-27

## 2023-02-27 RX ORDER — CHLORHEXIDINE GLUCONATE 4 G/100ML
SOLUTION TOPICAL DAILY PRN
Status: DISCONTINUED | OUTPATIENT
Start: 2023-02-27 | End: 2023-02-27 | Stop reason: HOSPADM

## 2023-02-27 RX ORDER — ROCURONIUM BROMIDE 10 MG/ML
INJECTION, SOLUTION INTRAVENOUS AS NEEDED
Status: DISCONTINUED | OUTPATIENT
Start: 2023-02-27 | End: 2023-02-27

## 2023-02-27 RX ORDER — LIDOCAINE HYDROCHLORIDE 10 MG/ML
INJECTION, SOLUTION EPIDURAL; INFILTRATION; INTRACAUDAL; PERINEURAL AS NEEDED
Status: DISCONTINUED | OUTPATIENT
Start: 2023-02-27 | End: 2023-02-27

## 2023-02-27 RX ORDER — HYDROMORPHONE HCL/PF 1 MG/ML
SYRINGE (ML) INJECTION AS NEEDED
Status: DISCONTINUED | OUTPATIENT
Start: 2023-02-27 | End: 2023-02-27

## 2023-02-27 RX ORDER — PENICILLIN V POTASSIUM 500 MG/1
500 TABLET ORAL EVERY 6 HOURS SCHEDULED
Qty: 8 TABLET | Refills: 0 | Status: SHIPPED | OUTPATIENT
Start: 2023-02-27 | End: 2023-03-01

## 2023-02-27 RX ORDER — FENTANYL CITRATE 50 UG/ML
INJECTION, SOLUTION INTRAMUSCULAR; INTRAVENOUS AS NEEDED
Status: DISCONTINUED | OUTPATIENT
Start: 2023-02-27 | End: 2023-02-27

## 2023-02-27 RX ORDER — DEXAMETHASONE SODIUM PHOSPHATE 10 MG/ML
INJECTION, SOLUTION INTRAMUSCULAR; INTRAVENOUS AS NEEDED
Status: DISCONTINUED | OUTPATIENT
Start: 2023-02-27 | End: 2023-02-27

## 2023-02-27 RX ORDER — FENTANYL CITRATE/PF 50 MCG/ML
25 SYRINGE (ML) INJECTION
Status: DISCONTINUED | OUTPATIENT
Start: 2023-02-27 | End: 2023-02-27 | Stop reason: HOSPADM

## 2023-02-27 RX ORDER — OXYCODONE HYDROCHLORIDE 5 MG/1
5 TABLET ORAL EVERY 4 HOURS PRN
Qty: 20 TABLET | Refills: 0 | Status: SHIPPED | OUTPATIENT
Start: 2023-02-27 | End: 2023-03-09

## 2023-02-27 RX ORDER — PROPOFOL 10 MG/ML
INJECTION, EMULSION INTRAVENOUS AS NEEDED
Status: DISCONTINUED | OUTPATIENT
Start: 2023-02-27 | End: 2023-02-27

## 2023-02-27 RX ORDER — SODIUM CHLORIDE, SODIUM LACTATE, POTASSIUM CHLORIDE, CALCIUM CHLORIDE 600; 310; 30; 20 MG/100ML; MG/100ML; MG/100ML; MG/100ML
125 INJECTION, SOLUTION INTRAVENOUS CONTINUOUS
Status: DISCONTINUED | OUTPATIENT
Start: 2023-02-27 | End: 2023-02-27 | Stop reason: HOSPADM

## 2023-02-27 RX ORDER — ONDANSETRON 2 MG/ML
4 INJECTION INTRAMUSCULAR; INTRAVENOUS ONCE AS NEEDED
Status: DISCONTINUED | OUTPATIENT
Start: 2023-02-27 | End: 2023-02-27 | Stop reason: HOSPADM

## 2023-02-27 RX ORDER — ONDANSETRON 2 MG/ML
INJECTION INTRAMUSCULAR; INTRAVENOUS AS NEEDED
Status: DISCONTINUED | OUTPATIENT
Start: 2023-02-27 | End: 2023-02-27

## 2023-02-27 RX ORDER — HYDROMORPHONE HCL/PF 1 MG/ML
0.5 SYRINGE (ML) INJECTION
Status: DISCONTINUED | OUTPATIENT
Start: 2023-02-27 | End: 2023-02-27 | Stop reason: HOSPADM

## 2023-02-27 RX ADMIN — HYDROMORPHONE HYDROCHLORIDE 0.5 MG: 1 INJECTION, SOLUTION INTRAMUSCULAR; INTRAVENOUS; SUBCUTANEOUS at 12:51

## 2023-02-27 RX ADMIN — HYDROMORPHONE HYDROCHLORIDE 0.5 MG: 1 INJECTION, SOLUTION INTRAMUSCULAR; INTRAVENOUS; SUBCUTANEOUS at 12:41

## 2023-02-27 RX ADMIN — MIDAZOLAM HYDROCHLORIDE 2 MG: 1 INJECTION, SOLUTION INTRAMUSCULAR; INTRAVENOUS at 09:50

## 2023-02-27 RX ADMIN — CHLORHEXIDINE GLUCONATE 0.12% ORAL RINSE 15 ML: 1.2 LIQUID ORAL at 09:26

## 2023-02-27 RX ADMIN — SUGAMMADEX 200 MG: 100 INJECTION, SOLUTION INTRAVENOUS at 11:39

## 2023-02-27 RX ADMIN — PROPOFOL 200 MG: 10 INJECTION, EMULSION INTRAVENOUS at 10:09

## 2023-02-27 RX ADMIN — DEXAMETHASONE SODIUM PHOSPHATE 10 MG: 10 INJECTION, SOLUTION INTRAMUSCULAR; INTRAVENOUS at 10:09

## 2023-02-27 RX ADMIN — CEFAZOLIN SODIUM 2000 MG: 2 SOLUTION INTRAVENOUS at 10:10

## 2023-02-27 RX ADMIN — BUPIVACAINE 10 ML: 13.3 INJECTION, SUSPENSION, LIPOSOMAL INFILTRATION at 09:55

## 2023-02-27 RX ADMIN — PHENYLEPHRINE HYDROCHLORIDE 25 MCG/MIN: 10 INJECTION INTRAVENOUS at 10:37

## 2023-02-27 RX ADMIN — ROCURONIUM BROMIDE 50 MG: 50 INJECTION, SOLUTION INTRAVENOUS at 10:09

## 2023-02-27 RX ADMIN — FENTANYL CITRATE 75 MCG: 50 INJECTION, SOLUTION INTRAMUSCULAR; INTRAVENOUS at 10:09

## 2023-02-27 RX ADMIN — ROCURONIUM BROMIDE 20 MG: 50 INJECTION, SOLUTION INTRAVENOUS at 11:14

## 2023-02-27 RX ADMIN — FENTANYL CITRATE 25 MCG: 50 INJECTION, SOLUTION INTRAMUSCULAR; INTRAVENOUS at 09:50

## 2023-02-27 RX ADMIN — ONDANSETRON 4 MG: 2 INJECTION INTRAMUSCULAR; INTRAVENOUS at 11:32

## 2023-02-27 RX ADMIN — LIDOCAINE HYDROCHLORIDE 50 MG: 10 INJECTION, SOLUTION EPIDURAL; INFILTRATION; INTRACAUDAL at 10:09

## 2023-02-27 RX ADMIN — SODIUM CHLORIDE, SODIUM LACTATE, POTASSIUM CHLORIDE, AND CALCIUM CHLORIDE 125 ML/HR: .6; .31; .03; .02 INJECTION, SOLUTION INTRAVENOUS at 09:00

## 2023-02-27 RX ADMIN — BUPIVACAINE HYDROCHLORIDE 10 ML: 5 INJECTION, SOLUTION EPIDURAL; INTRACAUDAL; PERINEURAL at 09:55

## 2023-02-27 NOTE — H&P
H&P Exam - Orthopedics   Mark Marroquin 50 y o  male MRN: [id number]  Unit/Bed#: [facility] 13    Assessment/Plan   Assessment:  Left shoulder supraspinatus tear with subluxation of the biceps tendon and tendonosis of the subscapularis  Plan:  Proceed with arthroscopic debridement and rotator cuff repair of the left shoulder with open biceps tenodesis  Return to clinic post op for suture removal     History of Present Illness   HPI:  Mark Marroquin is a 50 y o  male who presents with left rotator cuff tear s/p fall down steps with persistent weakness of the left shoulder s/p a course of physical therapy  He presents today for surgical intervention  He states he has had no changes in his medical or surgical history  He has been NPO since midnight      Historical Information  Review Of Systems:   · Skin: Normal  · Neuro: See HPI  · Musculoskeletal: See HPI  · 14 point review of systems negative except as stated above     Past Medical History:   Past Medical History:   Diagnosis Date   • Anesthesia     "woke up during procedure"       Past Surgical History:   Past Surgical History:   Procedure Laterality Date   • EYE SURGERY      lasik   • FOOT SURGERY Bilateral     hammer toe   • FOREARM SURGERY Right    • HAND SURGERY Left    • CA NEUROPLASTY &/TRANSPOS MEDIAN NRV CARPAL TUNNE Left 8/20/2019    Procedure: CARPAL TUNNEL RELEASE;  Surgeon: Eugene Robertson MD;  Location: [facility];  Service: Orthopedics   • CA NEUROPLASTY &/TRANSPOS MEDIAN NRV CARPAL Teresia Din Right 12/4/2020    Procedure: RELEASE CARPAL TUNNEL;  Surgeon: Eugene Robertson MD;  Location:  MAIN OR;  Service: Orthopedics   • SHOULDER SURGERY Right     x2       Family History:  Family history reviewed and non-contributory  Family History   Problem Relation Age of Onset   • Diabetes Father    • Diabetes Paternal Grandmother        Social History:  Social History     Socioeconomic History   • Marital status: Single     Spouse name: None   • Number of children: None   • Years of education: None   • Highest education level: None   Occupational History   • None   Tobacco Use   • Smoking status: Every Day     Packs/day: 0 50     Types: Cigarettes   • Smokeless tobacco: Never   • Tobacco comments:     last cigarette at 0644   Vaping Use   • Vaping Use: Never used   Substance and Sexual Activity   • Alcohol use: Yes     Alcohol/week: 4 0 standard drinks     Types: 4 Cans of beer per week     Comment: occasionally   • Drug use: Never   • Sexual activity: None   Other Topics Concern   • None   Social History Narrative   • None     Social Determinants of Health     Financial Resource Strain: Not on file   Food Insecurity: Not on file   Transportation Needs: Not on file   Physical Activity: Not on file   Stress: Not on file   Social Connections: Not on file   Intimate Partner Violence: Not on file   Housing Stability: Not on file       Allergies:    Allergies   Allergen Reactions   • Adhesive [Medical Tape] Hives           Labs:  0   Lab Value Date/Time    HCT 46 8 12/06/2022 0633    HCT 48 7 06/19/2020 0710    HCT 49 1 05/23/2018 0805    HCT 45 4 05/05/2015 0800    HCT 44 5 08/13/2014 0836    HGB 15 8 12/06/2022 0633    HGB 16 3 06/19/2020 0710    HGB 17 0 05/23/2018 0805    HGB 15 9 05/05/2015 0800    HGB 15 4 08/13/2014 0836    WBC 8 65 12/06/2022 0633    WBC 13 47 (H) 06/19/2020 0710    WBC 8 14 05/23/2018 0805    WBC 9 50 05/05/2015 0800    WBC 7 58 08/13/2014 0836       Meds:    Current Facility-Administered Medications:   •  bupivacaine liposomal (EXPAREL) 1 3 % injection 10 mL, 10 mL, Infiltration, Once, Daniel Oliva MD  •  ceFAZolin (ANCEF) IVPB (premix in dextrose) 2,000 mg 50 mL, 2,000 mg, Intravenous, Once, Fiorella Akins PA-C  •  chlorhexidine (HIBICLENS) 4 % topical liquid, , Topical, Daily PRN, Fiorella Akins PA-C  •  EPINEPHrine PF (ADRENALIN) 1 mg in sodium chloride 0 9 % 3,000 mL irrigation bag, , Irrigation, Once, [facility]  •  lactated ringers infusion, 125 mL/hr, Intravenous, Continuous, Kiersten Rodriguez MD, Last Rate: 125 mL/hr at 02/27/23 0900, 125 mL/hr at 02/27/23 0900    Blood Culture:   No results found for: BLOODCX    Wound Culture:   No results found for: WOUNDCULT    Ins and Outs:  No intake/output data recorded              Physical Exam  /72   Pulse (!) 10   Temp 98 2 °F (36 8 °C) (Temporal)   Resp 16   Ht 6' (1 829 m)   Wt 114 kg (251 lb)   SpO2 95%   BMI 34 04 kg/m²   /72   Pulse (!) 10   Temp 98 2 °F (36 8 °C) (Temporal)   Resp 16   Ht 6' (1 829 m)   Wt 114 kg (251 lb)   SpO2 95%   BMI 34 04 kg/m²   Gen: No acute distress, resting comfortably in bed  HEENT: Eyes clear, moist mucus membranes, hearing intact  Respiratory: No audible wheezing or stridor  Cardiovascular: Well Perfused peripherally, 2+ distal pulse  Abdomen: nondistended, no peritoneal signs  Ortho Exam:   Left upper extremity  - Skin intact, normal shoulder girdle  - Mild tenderness to lateral shoulder  - Left shoulder ROM: 90 deg forward flexion, 80 deg abduction, external rotation 60 deg, internal rotation T12  - Strength: 4/5  Forward flexion, 4/5 abduction, 4+/5 internal rotation, 5/5 external rotation  - Positive Yates  - 2+ radial pulse    Neuro Exam:   Left upper extremity  - Motor intact m/r/u/ax distribution  - Sensation intact to light touch m/r/u/ax distribution    Lab Results: Reviewed  Imaging: Reviewed

## 2023-02-27 NOTE — OP NOTE
OPERATIVE REPORT  PATIENT NAME: Korey Gamboa    :  1975  MRN: [id number]  Pt Location: [facility] OR ROOM 01    SURGERY DATE: 2023    Surgeon(s) and Role:     * Estefani Silva - Primary     * Fernando Carmen PA-C - Assisting     * Maxim Presley MD - Assisting    Preop Diagnosis:  Subluxation of tendon of long head of biceps [S43 003A]    Post-Op Diagnosis Codes:     * Subluxation of tendon of long head of biceps [Q92 944E]    Procedure(s):  Left - ARTHROSCOPY SHOULDER- left  rotator cuff repair  biceps tenodesis and all necessary procedures    Specimen(s):  * No specimens in log *    Estimated Blood Loss:   Minimal    Drains:  * No LDAs found *    Anesthesia Type:   General w/ Interscalene Block    Operative Indications:  Subluxation of tendon of long head of biceps [S43 003A]  Full-thickness supraspinatus rotator cuff tear    Operative Findings:  Small crescent-shaped full-thickness supraspinatus tendon tear  Long head biceps tendon medial subluxation  Upper one third border subscapularis tendon tear  Complications:   None    Procedure and Technique:    INDICATIONS AND HISTORY:  This is a 50 y o  male with left shoulder pain  The patient failed extensive conservative measures  MRI findings were significant and correlated for the patient's clinical symptoms  The patient was indicated for left shoulder arthroscopy with rotator cuff repair, biceps tenodesis and all necessary procedures  The patient understood the risks and benefits of procedure with risks including pain, stiffness, infection, failure of repair, fracture, retear, neurovascular injury, blood loss, blood clots, stroke, heart attack, all up to and including death  Patient understood and consented for surgery today  DESCRIPTION OF OPERATION:  The patient was identified, and the procedure verified  The patient was seen in pre-op holding area where the operative extremity was marked   The patient was taken to the operating room and placed in beach chair position  The left upper extremity was prepped and draped in the usual sterile fashion  A time-out was called  The patient was administered Ancef 2 g IV prior to incision  Using an 11 blade knife, a posterior portal was established  The arthroscope was placed in glenohumeral joint  The articular cartilage grossly normal    The anterior and posterior labrum showed no significant tearing  The long head of biceps tendon showed extensive tenosynovitis  It also showed medial subluxation  An anterior portal was established and a long head of biceps tendon was tenotomized with an arthroscopic scissor for later tenodesis  The biceps anchor was intact  The subscapularis tendon showed upper one third border subscapularis tendon tear  An anterior lateral superior accessory portal was established  The lesser tuberosity was debrided down to bleeding bone using combination of the vapor wand and arthroscopic bur  The subscapularis tendon was then repaired using 2 fiber link sutures and one 4 75 swivel lock anchor  The supraspinatus demonstrated a small full-thickness tear of the anterior leading edge  The infraspinatus was intact  The teres minor tendon was normal  The arthroscope was then placed in subacromial space  Lateral portal was established  The supraspinatus footprint was prepped using a combination of an ablator, arthroscopic bur, and a chondral pick for microfracture  The supraspinatus tendon tear was then repaired using a 2 6 all suture knotless fiber tack anchors in a double row one-to-one SpeedBridge configuration and using one 4 75 swivel lock anchor for the lateral row  Once the rotator cuff was repaired, the excess fluid was evacuated from the shoulder  Attention was then brought to the subpectoral biceps tenodesis  Using a 15 blade knife an incision was made in the axilla  Subcutaneous dissection was taken down to the head of the biceps tendon    This was taken of the wound   Using a Arthrex FiberLoop stage the tendon was whipstitch near the musculotendinous junction  The anterior humeral cortex just inferior to the inferior border of the pectoralis major tendon was prepped using a Key elevator to decorticate the bone  A drill bit was then used to make a unicortical bone tunnel in this area  An Arthrex knotless fiber tack anchor was placed into the intramedullary canal through the bone tunnel  The 2 strands of the FiberLoop stitch were then shuttled through the knotless anchor reducing the long head of biceps tendon to the anterior humeral cortex  The 2 strands of the FiberLoop suture was tied on top of the tendon to complete the tenodesis  Adequate fixation was achieved  The remaining proximal biceps stump was amputated  Copious irrigation was then performed in the wound  The wound was closed with 2-0 Monocryl subcutaneously  The epidermis was closed with 3-0 Monocryl in a running subcuticular stitch  Exofin was then placed  The portal holes were closed with 3-0 nylon  Dressings included Mepilex dressing  The patient was placed in a shoulder immobilizer  There was no complications throughout the case  The patient was taken to PACU in stable condition  The patient tolerated the procedure well       I was present for the entire procedure and A physician assistant was required during the procedure for retraction tissue handling,dissection and suturing    Patient Disposition:  PACU         SIGNATURE: Estefani Silva  DATE: February 27, 2023  TIME: 12:46 PM

## 2023-02-27 NOTE — DISCHARGE INSTR - AVS FIRST PAGE
SENG Gómez O  [address] [facility] 309 [facility], [address] [place] [address]  Phone: [phone]      Sports Medicine, Shoulder, Elbow, Knee and Arthroscopic Surgery                          Shoulder Arthroscopy  Rotator Cuff Repair  POST-OPERATIVE INSTRUCTIONS - PAGE 1    WOUND CARE:   WHAT IS COVERING MY SHOULDER? THE DEEPEST LAYER CONSISTS OF NYLON SUTURE THAT COVER THE PORTALS  ON TOP OF THAT ARE SMALL, BROWN BANDAGES  KEEP THESE CLEAN AND DRY  WHEN DO I REMOVE MY DRESSINGS AND WHAT DO I TAKE OFF? THE BROWN DRESSINGS STAY ON UNTIL YOU FOLLOW UP FOR STITCH REMOVAL IN THE OFFICE  IT IS NORMAL FOR THE BANDAGES TO BE BLOOD ENCRUSTED  YOU CAN COVER THEM WITH BAND-AIDS AND CHANGE THE BAND-AIDS DAILY UNTIL YOU ARE SEEN POST-OPERATIVELY IN THE OFFICE  IF THEY INADVERTANTLY FALL OFF COVER THE ACTUAL PORTALS WITH BAND-AIDS AND CHANGE DAILY UNTIL YOU ARE SEEN POST-OPERATIVELY IN THE OFFICE  WHEN CAN I SHOWER? YOU MAY SHOWER AFTER THE FIRST POST-OPERATIVE VISIT WHICH IS TYPICALLY 1 WEEK AFTER SURGERY  UNTIL THEN SPONGE BATHE  YOU MUST KEEP YOUR WOUNDS CLEAN AND DRY AT ALL TIMES UNTIL YOUR FIRST FOLLOW UP  THIS IS TO PREVENT INFECTION  ICE  SHOULDER SWELLING IS EXPECTED! WE SUGGEST THAT YOU APPLY ICE TO THE TOP OF THE SHOULDER FOR THE FIRST THREE DAYS 20 MINUTES EVERY FEW HOURS  THE ICE SHOULD BE SEALED IN A PLASTIC BAG AND THE BAG PLACED IN A TOWEL TO KEEP THE DRESSING DRY  MEDICATIONS:  THERE ARE TWO BASIC MEDICATIONS THAT YOU MAY RECEIVE SEPARATE FROM YOUR “USUAL” MEDICATIONS  THESE MAY INCLUDE A PAIN PILL AND AN ANTIBIOTIC  TAKE THEM AS PRESCRIBED  IF YOU HAVE ANY UNUSUAL SYMPTOMS SUCH AS RASHES, ITCHINESS OR DIARRHEA, PLEASE DISCONTINUE THE MEDICATION AND CALL OUR OFFICE OR YOUR MEDICAL DOCTOR  GI DISCOMFORT AND NAUSEA ARE NOT UNUSUAL WITH PAIN MEDICATION AND ANTIBIOTICS, BUT PLEASE CALL IF YOU ARE NOT ABLE TO DRINK OR EAT BY THE NIGHT OF SURGERY                                         SHOULDER ARTHROSCOPY ROTATOR CUFF REPAIR  POST-OPERATIVE INSTRUCTIONS - PAGE 2      POST-OPERATIVE EMERGENCIES & CONCERNS:  HEART, LUNG, CALF PROBLEMS                                                                                       IF YOU DEVELOP CHEST PAIN, SHORTNESS OF BREATH OR SIGNIFICANT CALF PAIN, YOU MUST GO TO THE NEAREST EMERGENCY ROOM  BLEEDING OR DRAINAGE  SOME BLEEDING AND DRAINAGE IS EXPECTED  IF THE BANDAGE BECOMES STAINED AND YOU THINK THAT THE DRAINAGE IS [place]  CALL THE OFFICE OR USE [employer] ONLINE TO MESSAGE DR Larissa Napoles  FEVER  IF YOU HAVE A TEMPERATURE GREATER THAN 101 DEGREES ON MORE THAN ONE READING 48 HOURS OR MORE AFTER SURGERY…… CALL THE OFFICE OR USE [employer] ON[employer] TO MESSAGE DR Larissa Napoles  SWELLING  SWELLING IS USUAL FOR THE FIRST THREE DAYS UNTIL THE FLUID IS DISSIPATED INTO THE DRESSINGS  IF YOU HAVE NUMBNESS, COLDNESS AND TINGLING IN THE UPPER EXTREMITY…  Thomasina Cooks CALL THE OFFICE OR USE [employer] ONLINE TO MESSAGE DR Larissa Napoles  UNRELENTING PAIN  IF SEVERE PAIN REMAINS 48 HOURS AFTER SURGERY…  CALL THE OFFICE OR USE [employer] ONLINE TO MESSAGE DR Larissa Napoles  ANSWERING SERVICES, CELL PHONES AND BEEPERS ARE NOT PERFECT  COMMON SENSE DICTATES THAT IF YOU CAN'T GET A HOLD OF YOUR ORTHOPEDIC SURGEON OR YOUR MEDICAL DOCTOR, YOU SHOULD GO TO THE EMERGENCY ROOM TO PLAY IT SAFE !!    GOING TO THE BATHROOM:  TYPICALLY, PATIENTS WILL HAVE URINATED PRIOR TO LEAVING [facility] [address]  IF YOU FIND IT DIFFICULT TO URINATE WHEN YOU ARE AT HOME BY THE EVENING HOURS, PLEASE CALL US OR SIMPLY GO TO THE EMERGENCY ROOM  THIS IS TRUE FOR PATIENTS WITH OR WITHOUT A HISTORY OF PROSTATE DISEASE OR BLADDER PROBLEMS  SHOULDER ARTHROSCOPY ROTATOR CUFF REPAIR  POST-OPERATIVE INSTRUCTIONS - PAGE 3    SHOULDER SLING/IMMOBILIZER? Your arm has been placed in a sling for your comfort and protection  You may adjust it  as necessary to ensure comfort     During the first 24 hours, you should wear the sling at all times until your nerve block  completely wears off  You may remove the sling for bathing or dressing with care not to actively lift your arm  away from the side or extend your arm to the extremes of motion  OTHERWISE YOUR SLING SHOULD BE ON AT ALL TIMES! Depending on the complexity of the procedure performed, you will wear the sling for  4-6 weeks  It is important to wear the sling at night to prevent undue injury as a result of restless  sleep  Activity Restriction   During rotator cuff repair, your torn tendons are reconnected to their insertion on the bone using sutures and anchors  During the first several weeks following surgery, the strength of this repair is only as strong as the strength of the sutures, and it is critical that no stress is applied to the repair   The following restrictions apply in the immediate postoperative period  o No active lifting of your arm away from your side (ie - do not lift your arm up or away from your body on your own)  o No carrying anything more than a cup of coffee or daily newspaper  o No pushing or pulling such as opening or closing a door  o Keep your arm in the sling at all times except dressing or bathing   You may bend and straighten your elbow and fingers as much as you want   When you begin physical therapy, your activity and range of motion restrictions will be reviewed in detail  WHEN DO I BEGIN SUPERVISED PHYSICAL THERAPY? THIS WILL BE DISCUSSED AT YOUR FIRST POST-OPERATIVE VISIT  WHEN CAN I DRIVE? YOU CANNOT DRIVE UNTIL GIVEN PERMISSION POST-OPERATIVELY BY YOUR SURGEON  EVEN WITH PERMISSION, DO NOT DRIVE UNDER THE INFLUENCE OF YOUR POST-OPERATIVE PAIN MEDICATION!

## 2023-02-27 NOTE — ANESTHESIA PREPROCEDURE EVALUATION
Procedure:  ARTHROSCOPY SHOULDER- left, rotator cuff repair, biceps tenodesis and all necessary procedures (Left: Shoulder)    Relevant Problems   PULMONARY   (+) Smoking      Other   (+) Class 2 obesity in adult      Plan for Left Interscalene Block with Exparel followed by general anesthesia with ETT  Risks and benefits explained to patient  Patient and surgeon in agreement with the above plan  Physical Exam    Airway    Mallampati score: II  TM Distance: >3 FB  Neck ROM: full     Dental       Cardiovascular      Pulmonary      Other Findings        Anesthesia Plan  ASA Score- 2     Anesthesia Type- general with ASA Monitors  Additional Monitors:   Airway Plan: ETT  Plan Factors-Exercise tolerance (METS): >4 METS  Chart reviewed  Patient summary reviewed  Patient is a current smoker  Patient did not smoke on day of surgery  Obstructive sleep apnea risk education given perioperatively  Induction- intravenous  Postoperative Plan- Plan for postoperative opioid use  Planned trial extubation    Informed Consent- Anesthetic plan and risks discussed with patient  I personally reviewed this patient with the CRNA  Discussed and agreed on the Anesthesia Plan with the CRNA  Letty Knutson

## 2023-02-27 NOTE — ANESTHESIA POSTPROCEDURE EVALUATION
Post-Op Assessment Note    CV Status:  Stable  Pain Score: 1    Pain management: adequate     Mental Status:  Alert and awake   Hydration Status:  Euvolemic   PONV Controlled:  Controlled   Airway Patency:  Patent   Two or more mitigation strategies used for obstructive sleep apnea   Post Op Vitals Reviewed: Yes      Staff: Anesthesiologist, CRNA         No notable events documented      BP  112/60   Temp   98 2   Pulse    83   Resp   18   SpO2   95% on RA

## 2023-03-03 ENCOUNTER — APPOINTMENT (OUTPATIENT)
Dept: PHYSICAL THERAPY | Facility: CLINIC | Age: 48
End: 2023-03-03

## 2023-03-06 ENCOUNTER — TELEPHONE (OUTPATIENT)
Dept: OBGYN CLINIC | Facility: HOSPITAL | Age: 48
End: 2023-03-06

## 2023-03-06 NOTE — TELEPHONE ENCOUNTER
Caller: patient    Doctor: garrett    Reason for call: patient thinks he might be allergic to penicillin  He has a rash down his left arm, hand, ear and neck  Most of his left side has the rash and a little has spread to right side of body  The rash is itchy and patient has been taking benadryl and putting ointment on it to try to help with the itch  Patient has a PT appt tomorrow and wants to know if he should wait till after his appt with our office Thursday       Call back#: [phone]

## 2023-03-06 NOTE — TELEPHONE ENCOUNTER
May be allergic reaction  Ok to go to PT  Continue benadryl  He did only have 2 days of PCN but may be related to that  Any other concerns he should be seen by UC or ER or PCP  148

## 2023-03-06 NOTE — TELEPHONE ENCOUNTER
CLM for pt on number provided  Also advised to notify PCP to update chart  Updated allergy in pt's chart

## 2023-03-07 ENCOUNTER — OFFICE VISIT (OUTPATIENT)
Dept: PHYSICAL THERAPY | Facility: CLINIC | Age: 48
End: 2023-03-07

## 2023-03-07 ENCOUNTER — OFFICE VISIT (OUTPATIENT)
Dept: DIABETES SERVICES | Facility: CLINIC | Age: 48
End: 2023-03-07

## 2023-03-07 DIAGNOSIS — E11.9 DIABETES MELLITUS WITHOUT COMPLICATION (HCC): Primary | ICD-10-CM

## 2023-03-07 DIAGNOSIS — Z51.89 AFTERCARE: ICD-10-CM

## 2023-03-07 DIAGNOSIS — M79.602 LEFT ARM PAIN: Primary | ICD-10-CM

## 2023-03-07 NOTE — PROGRESS NOTES
PT Evaluation     Today's date: 3/7/2023  Patient name: Elle Acharya  : 1975  MRN: [id number]  Referring provider: Melissa Colvin PA-C  Dx: No diagnosis found  Assessment  Assessment details: Pt presents with signs and symptoms synonymous of admitting diagnosis of R RTC Repair + Tenodesis  Pt presents in very good form with well controlled pain and good PROM for first performance  He was educated about his protocol and signs and symptoms of infection  Pt presents with pain, decreased strength, decreased range, flexibility, as well as tolerance to activity and postural awareness  Pt would benefit skilled PT intervention in order to address these impairments in order to be able to perform all desired activities with minimal to nil symptom exacerbation  Due to his high copay and good presentation, 1x a week seems to be most appropriate, once he advances in his protocol, he can likely come 2x's a week pending needs  Thank you very much for this kind, motivated and familiar referral      Impairments: abnormal or restricted ROM, activity intolerance, impaired physical strength, lacks appropriate home exercise program, pain with function, poor posture  and poor body mechanics  Understanding of Dx/Px/POC: good   Prognosis: good    Goals  STG 4 Weeks:  Decrease pain at worst to 4  Improve range to per protocol  Improve strength to assess AROM  Independent with HEP  MTG 8 Weeks:  Decrease pain at worst to 3  Improve range to per protocol  Improve strength to initiation of strengthening  Independent with HEP  MTG 12 Weeks:  Decrease pain at worst to 2  Improve range to per protocol  Improve strength to 4-/5  Independent with HEP  LTG 16 Weeks:  Decrease pain at worst to 1  Improve range to be within 5-10 of contralateral shoulder  Improve strength to 4/5     Able to perform all desired activities with minimal to nil symptom exacerbation  LTG 20 Weeks:  Assess prn    Plan  Plan details: 1-2x's a week due to high Copay  Patient would benefit from: skilled physical therapy  Planned modality interventions: cryotherapy and thermotherapy: hydrocollator packs  Planned therapy interventions: joint mobilization, manual therapy, activity modification, neuromuscular re-education, patient education, postural training, stretching, strengthening, therapeutic activities, therapeutic exercise, home exercise program, graded exercise, graded motor, graded activity, functional ROM exercises, flexibility, behavior modification and body mechanics training  Frequency: 2x week  Duration in weeks: 16  Treatment plan discussed with: patient        Subjective Evaluation    History of Present Illness  Date of onset: 3/7/2023  Date of surgery: 2023  Mechanism of injury: 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 and presents today post L RCR Repair with Tenodesis performed by Dr Melanie Britton on 23 after trial of PT intervention here at this this campus without success and MRI indicative of RC tear  He and Dr Melanie Britton were both in accord to have surgical intervention and thus referred back to PT post procedure  He reports he is feeling fairly well all things considered  Pt reports periods of time without pain and at worst it can be 6/10 with accidental movement  Has taken pain medication but not taken many only 4x's within the last week  Pt reports sleeping is going okay, not easy, using a recliner to sleep, not his favorable position  Pt reports no numbness in L UE  Pt reports neck is doing okay at the moment, R UE has numbness in finger but pain is much better almost entirely gone  Pt reports no change in bowel or bladder  Follows up with Dr Melanie Britton on 3/9/23  Pt reports goals at this time are to proceed with protocol, reduce pain and be able to get back to performing all tasks around home, vocational demands, and improve sleeping      Quality of life: good    Pain  Current pain ratin  At best pain ratin  At worst pain rating: 6  Quality: dull ache and sharp  Relieving factors: ice, medications and change in position  Progression: improved    Treatments  Previous treatment: injection treatment, medication and physical therapy  Patient Goals  Patient goals for therapy: increased strength, return to work, increased motion and decreased pain          Objective     Active Range of Motion   Left Shoulder   Flexion: Left shoulder active forward flexion: PROM 85  with pain  Abduction: Left shoulder active abduction: PROM 60  with pain  External rotation BTH: Active external rotation behind the head: PROM 20  with pain  Internal rotation BTB: Active internal rotation behind the back: PROM Stomach  with pain    Right Shoulder   Flexion: 170 degrees   Abduction: 165 degrees   External rotation BTH: C7   Internal rotation BTB: T8     Additional Active Range of Motion Details  Forward head, rounded shoulders  B/L Sensation intact to light touch C3,4,5,6,7,8,T1,T2   UE strength  L flex abd er ir NT  R flex abd er IR all  5/5  CS ROM  Flex Nil / Protraction Nil  Retraction Min  Ext Mod  SB /Rot R Min  SB / Rot L Full  Joint Mobility  Palpation  Sts  Elbow ROM Full B/L               Precautions: L shoulder RC repair + Tenodesis with Dr Victor Manuel Tomas on 2/27/23    APSX  Access Code: [email or web address]      Manuals 3/7            PROM MT per protocol 10 min                                                   Neuro Re-Ed             Education and progression 10 min                                                                                          Ther Ex             CS ROM 3" x 10            Scap Add 3" x 10            Elbow PROM 3" x 20            Pendulums CW/CCW 2 x 10            Gripping 3 mins Give putty YTB n v                                                     Ther Activity                                                                              Modalities             CP prn to L shoulder

## 2023-03-08 NOTE — PROGRESS NOTES
Living Well with Diabetes Group Class #1    Colt Petersen attended the Living Well with Diabetes Group Class #1  Topics Covered in class today include: What is diabetes; Types of Diabetes; How Diabetes is diagnosed; Management skills; the role of exercise in blood sugar managements, Home glucose monitoring, and target ranges  Paulie Marshall participated in group activities    The patient's history was reviewed and updated as appropriate: allergies, current medications  Lab Results   Component Value Date    HGBA1C 6 5 (H) 12/06/2022       Diabetes Education Record    Paulie Marshall was provided Living Well with Diabetes Class #1 book    Patient response to instruction    Comprehension: good  Motivation: good  Expected Compliance: good    Start- Stop: 6:00-7:30 PM  Total Minutes: 90 Minutes  Group or Individual Instruction: DSME-G  Other: Sophie Ace, DO    Thank you for referring your patient to [facility], it was a pleasure working with them today  Please feel free to call with any questions or concerns      Michael Tovar 87, [address] [phone]

## 2023-03-09 ENCOUNTER — OFFICE VISIT (OUTPATIENT)
Dept: OBGYN CLINIC | Facility: CLINIC | Age: 48
End: 2023-03-09

## 2023-03-09 VITALS
HEART RATE: 76 BPM | BODY MASS INDEX: 34 KG/M2 | OXYGEN SATURATION: 99 % | HEIGHT: 72 IN | DIASTOLIC BLOOD PRESSURE: 84 MMHG | WEIGHT: 251 LBS | SYSTOLIC BLOOD PRESSURE: 153 MMHG

## 2023-03-09 DIAGNOSIS — Z48.89 AFTERCARE FOLLOWING SURGERY: Primary | ICD-10-CM

## 2023-03-09 NOTE — PROGRESS NOTES
[facility] [address] Caitlin Trejo [address] [facility]  [id number]  1975    ORTHOPAEDIC SURGERY OUTPATIENT NOTE  3/9/2023      HISTORY:  50 y o  male s/p left shoulder arthroscopy, rotator cuff repair and biceps tenodesis performed on 02/27/2023  Patient has been in a shoulder immobilizer, removing it often  He states the pain as been very tolerable  He has begun physical therapy, first appointment was Tuesday  Overall, patient is doing very well       Past Medical History:   Diagnosis Date   • Anesthesia     "woke up during procedure"       Past Surgical History:   Procedure Laterality Date   • EYE SURGERY      lasik   • FOOT SURGERY Bilateral     hammer toe   • FOREARM SURGERY Right    • HAND SURGERY Left    • ND NEUROPLASTY &/TRANSPOS MEDIAN NRV CARPAL TUNNE Left 8/20/2019    Procedure: CARPAL TUNNEL RELEASE;  Surgeon: Jocelyn Don MD;  Location: [facility]  MAIN OR;  Service: Orthopedics   • ND NEUROPLASTY &/TRANSPOS MEDIAN NRV CARPAL [place] Hasting Right 12/4/2020    Procedure: RELEASE CARPAL TUNNEL;  Surgeon: Jocelyn Don MD;  Location:  MAIN OR;  Service: Orthopedics   • ND SURGICAL ARTHROSCOPY SHOULDER W/ROTATOR CUFF RPR Left 2/27/2023    Procedure: ARTHROSCOPY SHOULDER- left, rotator cuff repair, biceps tenodesis and all necessary procedures;  Surgeon: Shay Amos;  Location:  MAIN OR;  Service: Orthopedics   • SHOULDER SURGERY Right     x2       Social History     Socioeconomic History   • Marital status: Single     Spouse name: Not on file   • Number of children: Not on file   • Years of education: Not on file   • Highest education level: Not on file   Occupational History   • Not on file   Tobacco Use   • Smoking status: Every Day     Packs/day: 0 50     Types: Cigarettes   • Smokeless tobacco: Never   • Tobacco comments:     last cigarette at 0644   Vaping Use   • Vaping Use: Never used Substance and Sexual Activity   • Alcohol use: Yes     Alcohol/week: 4 0 standard drinks     Types: 4 Cans of beer per week     Comment: occasionally   • Drug use: Never   • Sexual activity: Not on file   Other Topics Concern   • Not on file   Social History Narrative   • Not on file     Social Determinants of Health     Financial Resource Strain: Not on file   Food Insecurity: Not on file   Transportation Needs: Not on file   Physical Activity: Not on file   Stress: Not on file   Social Connections: Not on file   Intimate Partner Violence: Not on file   Housing Stability: Not on file       Family History   Problem Relation Age of Onset   • Diabetes Father    • Diabetes Paternal Grandmother         Patient's Medications   New Prescriptions    No medications on file   Previous Medications    NICOTINE (NICODERM CQ) 21 MG/24 HR TD 24 HR PATCH    Place 1 patch on the skin over 24 hours every 24 hours    OXYCODONE (ROXICODONE) 5 IMMEDIATE RELEASE TABLET    Take 1 tablet (5 mg total) by mouth every 4 (four) hours as needed for moderate pain for up to 10 days Max Daily Amount: 30 mg   Modified Medications    No medications on file   Discontinued Medications    No medications on file       Allergies   Allergen Reactions   • Adhesive [Medical Tape] Hives   • Penicillins Rash     Pt reported rash on left side ear, neck, arm, both thighs, right arm and chest after taking 2 days of PCN  No resp distress or swelling of lips, tongue or throat  Rx'd Benadryl  There were no vitals taken for this visit  REVIEW OF SYSTEMS:  Constitutional: Negative  HEENT: Negative  Respiratory: Negative  Skin: Negative  Neurological: Negative  Psychiatric/Behavioral: Negative    Musculoskeletal: Negative except for that mentioned in the HPI     [unfilled]     PHYSICAL EXAM:      Left Shoulder Exam:   - skin intact   - incisions healing well, sutures intact   - no signs of infection   - sensation intact   - pulse intact IMAGING:  No imaging performed during today's visit       ASSESSMENT AND PLAN:  50 y o  male s/p left shoulder arthroscopy, rotator cuff repair and biceps tenodesis performed on 02/27/2023    - continue physical therapy; gave PT protocol to patient to give to PT when he sees them on Tuesday   - sutures removed without complications and steri strips applied   - patient may shower normally, I instructed him to let the warm soapy water run down the incision site, dont pick or scrub, the steri strips will fall off on their own  - I will see him back in 5 weeks for repeat evaluation     Scribe Attestation    I,:  Jim Nagy am acting as a scribe while in the presence of the attending physician :       I,:  Angelica Friend personally performed the services described in this documentation    as scribed in my presence :

## 2023-03-14 ENCOUNTER — OFFICE VISIT (OUTPATIENT)
Dept: DIABETES SERVICES | Facility: CLINIC | Age: 48
End: 2023-03-14

## 2023-03-14 ENCOUNTER — OFFICE VISIT (OUTPATIENT)
Dept: PHYSICAL THERAPY | Facility: CLINIC | Age: 48
End: 2023-03-14

## 2023-03-14 DIAGNOSIS — R29.898 SHOULDER WEAKNESS: ICD-10-CM

## 2023-03-14 DIAGNOSIS — Z51.89 AFTERCARE: ICD-10-CM

## 2023-03-14 DIAGNOSIS — M79.602 LEFT ARM PAIN: Primary | ICD-10-CM

## 2023-03-14 DIAGNOSIS — E11.9 DIABETES MELLITUS WITHOUT COMPLICATION (HCC): Primary | ICD-10-CM

## 2023-03-14 NOTE — PROGRESS NOTES
Daily Note     Today's date: 3/14/2023  Patient name: Luis Rodriguez  : 1975  MRN: [id number]  Referring provider: Saul Felix  Dx:   Encounter Diagnosis     ICD-10-CM    1  Left arm pain  M79 602       2  Aftercare  Z51 89       3  Shoulder weakness  R29 898           Start Time: 930          Subjective: Pt reports that he is having some difficulty sleeping, feels his wounds are healing  Objective: See treatment diary below      Assessment: Tolerated treatment well  PTA and PT clarified use of sling wear per protocol recommendations  Guarding during PROM limited movement this visit  Patient demonstrated fatigue post treatment      Plan: Continue per plan of care  Precautions: L shoulder RC repair + Tenodesis with Dr Leann Fishman on 23    [employer]  Access Code: [email or web address]      Manuals 3/7 03/14           PROM MT per protocol 10 min 10 min                                                  Neuro Re-Ed             Education and progression 10 min 10 min                                                                                          Ther Ex             CS ROM 3" x 10 3"x10           Scap Add 3" x 10 3"x10           Elbow PROM 3" x 20 3"x20           Pendulums CW/CCW 2 x 10 2x10           Gripping 3 mins YTB  3 min                                                   Ther Activity                                                                              Modalities             CP prn to L shoulder

## 2023-03-15 NOTE — PROGRESS NOTES
Living Well with Diabetes Group Class #2    Debbie Brice attended the Living Well with Diabetes Group Class #2  During class, Neftaly Espinoza was instructed on the following topics: Macronutrients, Carbohydrate sources, What one serving of carbohydrate equals, effects of diet on blood glucose levels, effect of carbohydrates on blood glucose levels, basics of meal planning: balance, portions, meal times, measurements, reading food labels to determine carbohydrates  Neftaly Espinoza participated in group activities of reading labels together and completing the meal planning activity  Neftaly Espinoza will follow up with class #3  Will call with any questions or concerns prior to next session  The patient's history was reviewed and updated as appropriate: allergies, current medications  Lab Results   Component Value Date    HGBA1C 6 5 (H) 12/06/2022     Diabetes Education Record    Neftaly Espinoza was provided Living Well with Diabetes Class #2 book  Patient response to instruction    Comprehension: good  Motivation: good  Expected Compliance: good    Start- Stop: 6:00-8:00 PM  Total Minutes: 120 Minutes  Group or Individual Instruction: [id number]  Other: Nilda Older, DO    Thank you for referring your patient to [address] [facility], it was a pleasure working with them today  Please feel free to call with any questions or concerns      Michael Bang 87, [address] [place] [address], [address] [place] [phone]

## 2023-03-21 ENCOUNTER — OFFICE VISIT (OUTPATIENT)
Dept: PHYSICAL THERAPY | Facility: CLINIC | Age: 48
End: 2023-03-21

## 2023-03-21 ENCOUNTER — OFFICE VISIT (OUTPATIENT)
Dept: DIABETES SERVICES | Facility: CLINIC | Age: 48
End: 2023-03-21

## 2023-03-21 DIAGNOSIS — E11.9 DIABETES MELLITUS WITHOUT COMPLICATION (HCC): Primary | ICD-10-CM

## 2023-03-21 DIAGNOSIS — R29.898 SHOULDER WEAKNESS: ICD-10-CM

## 2023-03-21 DIAGNOSIS — M79.602 LEFT ARM PAIN: Primary | ICD-10-CM

## 2023-03-21 DIAGNOSIS — Z51.89 AFTERCARE: ICD-10-CM

## 2023-03-21 NOTE — PROGRESS NOTES
Daily Note     Today's date: 3/21/2023  Patient name: Melissa Boss  : 1975  MRN: [id number]  Referring provider: Bea Velez  Dx:   Encounter Diagnosis     ICD-10-CM    1  Left arm pain  M79 602       2  Aftercare  Z51 89       3  Shoulder weakness  R29 898                      Subjective:  Pt presents today stating that his shoulder is feeling well, presents with sling today  Anxious to proceed with protocol  Objective: See treatment diary below      Assessment:  Next week will be week 4, can proceed with AAROM and full range within tolerance  Educated and pt was in full accord  Continue to progress as able  Patient demonstrated fatigue post treatment      Plan: Continue per plan of care  Precautions: L shoulder RC repair + Tenodesis with Dr James Vaughn on 23    [employer]  Access Code: [email or web address]      Manuals 3/7 03/14 3/21          PROM MT per protocol 10 min 10 min 10 min                                                 Neuro Re-Ed             Education and progression 10 min 10 min                                                                                          Ther Ex             CS ROM 3" x 10 3"x10 3" x 10          Scap Add 3" x 10 3"x10 3" x 10          Elbow PROM 3" x 20 3"x20 3" x 20          Pendulums CW/CCW 2 x 10 2x10 2  x10          Gripping 3 mins YTB  3 min  hold                                                 Ther Activity                                                                              Modalities             CP prn to L shoulder

## 2023-03-22 NOTE — PROGRESS NOTES
Living Well with Diabetes Group Class #3    Ye Willoughby attended the Living Well with Diabetes Group Class #3  During class, Eric Adam was instructed on the following topics: Oral and injectable medications, short term complications of diabetes, long term complications of diabetes, prevention of complications, foot care, sick day management, stress management, and traveling with diabetes  Eric Adam participated in group activities  Eric Adam will follow up with class #4  Will call with any questions or concerns prior to next session  The patient's history was reviewed and updated as appropriate: allergies, current medications  Lab Results   Component Value Date    HGBA1C 6 5 (H) 12/06/2022     Diabetes Education Record    Eric Adam was provided Living Well with Diabetes Class #3 book  Patient response to instruction    Comprehension: good  Motivation: good  Expected Compliance: good    Start- Stop: 6:00-8:00 PM  Total Minutes: 120 Minutes  Group or Individual Instruction: DSME-G  Other: Jennifer Garcia, DO    Thank you for referring your patient to [address] [facility], it was a pleasure working with them today  Please feel free to call with any questions or concerns      Michael Betts 87, [address] [place] [address], [address] [place] [phone]

## 2023-03-28 ENCOUNTER — OFFICE VISIT (OUTPATIENT)
Dept: PHYSICAL THERAPY | Facility: CLINIC | Age: 48
End: 2023-03-28

## 2023-03-28 ENCOUNTER — OFFICE VISIT (OUTPATIENT)
Dept: DIABETES SERVICES | Facility: CLINIC | Age: 48
End: 2023-03-28

## 2023-03-28 DIAGNOSIS — R29.898 SHOULDER WEAKNESS: ICD-10-CM

## 2023-03-28 DIAGNOSIS — M79.602 LEFT ARM PAIN: Primary | ICD-10-CM

## 2023-03-28 DIAGNOSIS — E11.9 DIABETES MELLITUS WITHOUT COMPLICATION (HCC): Primary | ICD-10-CM

## 2023-03-28 DIAGNOSIS — Z51.89 AFTERCARE: ICD-10-CM

## 2023-03-28 NOTE — PROGRESS NOTES
"Daily Note     Today's date: 3/28/2023  Patient name: Riya Davis  : 1975  MRN: [id number]  Referring provider: Jimmie Talavera  Dx:   Encounter Diagnosis     ICD-10-CM    1  Left arm pain  M79 602       2  Aftercare  Z51 89       3  Shoulder weakness  R29 898                      Subjective:  Pt reports he is feeling well  Neck and R shoulder also feeling well  Anxious to proceed with protocol  Objective: See treatment diary below  PROM L shoulder Flex 130, Abd 110, ER 40, IR to stomach  Assessment:  Good tolerance to proceeding with range as well as introduction to AAROM activities  Continue to progress as able per protocol  Plan: Continue per plan of care  Precautions: L shoulder RC repair + Tenodesis with Dr Gustavo Ortez on 23    resmio  Access Code: [email or web address]      Manuals 3/7 03/14 3/21 3/28         PROM MT per protocol 10 min 10 min 10 min 10 min                                                Neuro Re-Ed             Education and progression 10 min 10 min            Table Slides Flex abd ER    3\" x 10         Supine Wand flex     nv         AROM Flex, Abd ER    Week 5-6 trial pending tolerance                                                   Ther Ex             CS ROM 3\" x 10 3\"x10 3\" x 10 3\" x 10         Scap Add 3\" x 10 3\"x10 3\" x 10 3\" x 10         Elbow PROM 3\" x 20 3\"x20 3\" x 20 3\" x 20         Pendulums CW/CCW 2 x 10 2x10 2  x10 2 x 10         Gripping 3 mins YTB  3 min  hold                                                 Ther Activity                                                                              Modalities             CP prn to L shoulder                               "

## 2023-03-29 NOTE — PROGRESS NOTES
Living Well with Diabetes Group Class #4    Fred Shankar attended the Living Well with Diabetes Group Class #4  During class, Catherine Ortega was instructed on the following topics: Types of cholesterol, dietary sources of cholesterol, types of fat, types of fiber, reading food labels- in depth, healthy choices when dining out  Alexkun Ortega participated in group activities of reading labels together and completed the dining out activity  Destineyshaan Kaufmannolan will follow up with class #5 or additional DSMT/MNT as desired  Will call with any questions or concerns prior to next session  The patient's history was reviewed and updated as appropriate: allergies, current medications  Lab Results   Component Value Date    HGBA1C 6 5 (H) 12/06/2022     Diabetes Education Record    Destineyshaan Kaufmannolan was provided Living Well with Diabetes Class #4 book  Patient response to instruction    Comprehension: good  Motivation: good  Expected Compliance: good    Start- Stop: 6:00-7:30 PM  Total Minutes: 90 Minutes  Group or Individual Instruction: DSME-G  Other: Eligah Aschoff, DO    Thank you for referring your patient to [facility] [address] [facility], it was a pleasure working with them today  Please feel free to call with any questions or concerns      Michael Her 87, [address] [place] [address], [address] [place] [phone]

## 2023-04-04 ENCOUNTER — OFFICE VISIT (OUTPATIENT)
Dept: PHYSICAL THERAPY | Facility: CLINIC | Age: 48
End: 2023-04-04

## 2023-04-04 DIAGNOSIS — M79.602 LEFT ARM PAIN: Primary | ICD-10-CM

## 2023-04-04 DIAGNOSIS — Z51.89 AFTERCARE: ICD-10-CM

## 2023-04-04 NOTE — PROGRESS NOTES
"Daily Note     Today's date: 2023  Patient name: Monika Neal  : 1975  MRN: [id number]  Referring provider: Tabatha Landa  Dx:   Encounter Diagnosis     ICD-10-CM    1  Left arm pain  M79 602       2  Aftercare  Z51 89                      Subjective: Pt presents today 15 mins late  States that he is feeling well, no complaints  Objective: See treatment diary below      Assessment: PROM only performed due to time constraint, pt indicated he would be performing all of his exercises at home  PROM progressing comfortable and without issue  RE n v       Precautions: L shoulder RC repair + Tenodesis with Dr Mejia Li on 23    [employer]  Access Code: [email or web address]      Manuals 3/7 03/14 3/21 3/28 4/4        PROM MT per protocol 10 min 10 min 10 min 10 min 10 min                                               Neuro Re-Ed             Education and progression 10 min 10 min            Table Slides Flex abd ER    3\" x 10         Supine Wand flex     nv         AROM Flex, Abd ER    Week 5-6 trial pending tolerance                                                   Ther Ex             CS ROM 3\" x 10 3\"x10 3\" x 10 3\" x 10         Scap Add 3\" x 10 3\"x10 3\" x 10 3\" x 10         Elbow PROM 3\" x 20 3\"x20 3\" x 20 3\" x 20         Pendulums CW/CCW 2 x 10 2x10 2  x10 2 x 10 3 x 10         Gripping 3 mins YTB  3 min  hold                                                 Ther Activity                                                                              Modalities             CP prn to L shoulder                               "

## 2023-04-25 ENCOUNTER — APPOINTMENT (OUTPATIENT)
Dept: PHYSICAL THERAPY | Facility: CLINIC | Age: 48
End: 2023-04-25

## 2023-05-02 ENCOUNTER — OFFICE VISIT (OUTPATIENT)
Dept: PHYSICAL THERAPY | Facility: CLINIC | Age: 48
End: 2023-05-02

## 2023-05-02 DIAGNOSIS — R29.898 SHOULDER WEAKNESS: ICD-10-CM

## 2023-05-02 DIAGNOSIS — Z51.89 AFTERCARE: ICD-10-CM

## 2023-05-02 DIAGNOSIS — M79.602 LEFT ARM PAIN: Primary | ICD-10-CM

## 2023-05-02 NOTE — PROGRESS NOTES
PT Evaluation     Today's date: 2023  Patient name: Deja Carrera  : 1975  MRN: [id number]  Referring provider: Chavez Anthony  Dx:   Encounter Diagnosis     ICD-10-CM    1  Left arm pain  M79 602       2  Aftercare  Z51 89       3  Shoulder weakness  R29 898                      Assessment  Assessment details: Pt is continue to make positive gains at this time  They have demonstrated further improvement in pain levels, range, tolerance to activity and are achieving protocol expectations at this time  They have achieved all MTGs sought out for them as well as by them  They will benefit continued Skilled PT intervention in order to achieve all LTGs sought out for them as well as by them in order to perform all desired activities with minimal to nil symptom exacerbation  In particular focus will be further A/PROM end range, and strengthening when he is cleared at week 12  Both of these will accent his ability to be more practical and functional around his home, yard and vocational demands  Thank you very much for this kind and motivated referral         Impairments: abnormal or restricted ROM, activity intolerance, impaired physical strength, lacks appropriate home exercise program, pain with function, poor posture  and poor body mechanics  Understanding of Dx/Px/POC: good   Prognosis: good    Goals  STG 4 Weeks:  Decrease pain at worst to 4 -met  Improve range to per protocol -met  Improve strength to assess AROM -met  Independent with HEP -met  MTG 8 Weeks:  Decrease pain at worst to 3 -met  Improve range to per protocol -met  Improve strength to initiation of strengthening -week 12  Independent with HEP -met  MTG 12 Weeks:  Decrease pain at worst to 2  Improve range to per protocol  Improve strength to 4-/5  Independent with HEP  LTG 16 Weeks:  Decrease pain at worst to 1  Improve range to be within 5-10 of contralateral shoulder  Improve strength to 4/5     Able to perform all desired activities with minimal to nil symptom exacerbation  LTG 20 Weeks:  Assess prn    Plan  Plan details: 1-2x's a week due to high Copay  Patient would benefit from: skilled physical therapy  Planned modality interventions: cryotherapy and thermotherapy: hydrocollator packs  Planned therapy interventions: joint mobilization, manual therapy, activity modification, neuromuscular re-education, patient education, postural training, stretching, strengthening, therapeutic activities, therapeutic exercise, home exercise program, graded exercise, graded motor, graded activity, functional ROM exercises, flexibility, behavior modification and body mechanics training  Frequency: 2x week  Duration in weeks: 16  Treatment plan discussed with: patient        Subjective Evaluation    History of Present Illness  Date of onset: 3/7/2023  Date of surgery: 2023  Mechanism of injury: Pt presents today 9 weeks out from surgery and overall is doing well  Pt reports that he has been moving well, pain levels are minimal with quick movements or end range movement, occasional discomfort with laying, but overall sleeping much better and closer to the way he was prior to surgery  No issues around his home or with vocational demands  Pt reports that he is very content with progression, anxious to begin strengthening which will occur at week 12  Goals at this time are further reduction of pain, proceed with protocol, get back to being able to function entirely independent without restriction    Pt follows up with Dr Lisa Carmen on 23  Quality of life: good    Pain  Current pain ratin  At best pain ratin  At worst pain ratin  Quality: dull ache and sharp  Relieving factors: ice, medications and change in position  Progression: improved    Treatments  Previous treatment: injection treatment, medication and physical therapy  Patient Goals  Patient goals for therapy: increased strength, return to work, increased motion and "decreased pain          Objective     Active Range of Motion   Left Shoulder   Flexion: 145 (PROM 155) degrees   Abduction: 125 (PROM 150) degrees   External rotation BTH: C5 (PROM 60)   Internal rotation BTB: T12 (PROM 60)     Right Shoulder   Flexion: 170 degrees   Abduction: 165 degrees   External rotation BTH: C7   Internal rotation BTB: T8     Additional Active Range of Motion Details  Forward head, rounded shoulders  B/L Sensation intact to light touch C3,4,5,6,7,8,T1,T2   UE strength  L flex abd er ir NP  R flex abd er IR all  5/5  CS ROM  Flex Nil / Protraction Nil  Retraction Min  Ext Min  SB /Rot R Min  SB / Rot L Full  Joint Mobility  Palpation  Sts  Elbow ROM Full B/L               Precautions: L shoulder RC repair + Tenodesis with Dr Silvia Silverman on 2/27/23    [employer]  Access Code: [email or web address]      Manuals 3/7 03/14 3/21 3/28 4/4 4/11 4/18 5/2     PROM MT per protocol 10 min 10 min 10 min 10 min 10 min 10 min 10 min 10 min                                            Neuro Re-Ed             Education and progression 10 min 10 min            Table Slides Flex abd ER    3\" x 10  3\"  x 10 3\" x 10      Supine Wand flex     nv  10x 10x 10x     AROM Flex, Abd ER supine     Week 5-6 trial pending tolerance  X 15 X 15 X 20     Standing Flex Scap Abd       nv 3 x 5 2 x 10                                Ther Ex             CS ROM 3\" x 10 3\"x10 3\" x 10 3\" x 10  3\" x 20       Scap Add 3\" x 10 3\"x10 3\" x 10 3\" x 10  3\" x 20       Elbow PROM 3\" x 20 3\"x20 3\" x 20 3\" x 20  3\" x 20 3\" x 20 3\" x 30     Pendulums CW/CCW 2 x 10 2x10 2  x10 2 x 10 3 x 10  3 x 10       Gripping 3 mins YTB  3 min  hold          Pulleys      3 min 3 min 3 min                  IR ST with Strap       nv?  AROM 1-2\" x 10     Ther Activity                                                                              Modalities             CP prn to L shoulder      dec                              "

## 2023-05-09 ENCOUNTER — APPOINTMENT (OUTPATIENT)
Dept: PHYSICAL THERAPY | Facility: CLINIC | Age: 48
End: 2023-05-09
Payer: COMMERCIAL

## 2023-05-16 ENCOUNTER — OFFICE VISIT (OUTPATIENT)
Dept: PHYSICAL THERAPY | Facility: CLINIC | Age: 48
End: 2023-05-16

## 2023-05-16 DIAGNOSIS — M79.602 LEFT ARM PAIN: Primary | ICD-10-CM

## 2023-05-16 DIAGNOSIS — R29.898 SHOULDER WEAKNESS: ICD-10-CM

## 2023-05-16 DIAGNOSIS — Z51.89 AFTERCARE: ICD-10-CM

## 2023-05-16 NOTE — PROGRESS NOTES
"Daily Note     Today's date: 2023  Patient name: Dagmar De Dios  : 1975  MRN: [id number]  Referring provider: Tammie Perry  Dx:   Encounter Diagnosis     ICD-10-CM    1  Left arm pain  M79 602       2  Aftercare  Z51 89       3  Shoulder weakness  R29 898                      Subjective: Pt presents today stating that he is feeling well, without pain or irritation  Content with progression  Objective: See treatment diary below      Assessment: Progressing well with reps, no issues during today's session  Will be leaving on trip for 1 week, following up on 23  Can proceed with strengthening at that time  Precautions: L shoulder RC repair + Tenodesis with Dr Jaden La on 23    DGTS  Access Code: [email or web address]      Manuals 3/7 03/14 3/21 3/28 4/4 4/11 4/18 5/2 5/16    PROM MT per protocol 10 min 10 min 10 min 10 min 10 min 10 min 10 min 10 min 10 min                                           Neuro Re-Ed             Education and progression 10 min 10 min            Table Slides Flex abd ER    3\" x 10  3\"  x 10 3\" x 10      Supine Wand flex     nv  10x 10x 10x 10 x    AROM Flex, Abd ER supine     Week 5-6 trial pending tolerance  X 15 X 15 X 20 X 30 1#? Standing Flex Scap Abd       nv 3 x 5 2 x 10  3  x10 1#? Tband Row + Ext         nv    Tband ER + IR         nv    Ther Ex             CS ROM 3\" x 10 3\"x10 3\" x 10 3\" x 10  3\" x 20       Scap Add 3\" x 10 3\"x10 3\" x 10 3\" x 10  3\" x 20       Elbow PROM 3\" x 20 3\"x20 3\" x 20 3\" x 20  3\" x 20 3\" x 20 3\" x 30 3\" x 10    Pendulums CW/CCW 2 x 10 2x10 2  x10 2 x 10 3 x 10  3 x 10       Gripping 3 mins YTB  3 min  hold          Pulleys      3 min 3 min 3 min 3 min                 IR ST with Strap       nv?  AROM 1-2\" x 10 AROM 1-2\"    Ther Activity                                                                              Modalities             CP prn to L shoulder      dec                                   "

## 2023-05-23 ENCOUNTER — APPOINTMENT (OUTPATIENT)
Dept: PHYSICAL THERAPY | Facility: CLINIC | Age: 48
End: 2023-05-23
Payer: COMMERCIAL

## 2023-05-30 ENCOUNTER — EVALUATION (OUTPATIENT)
Dept: PHYSICAL THERAPY | Facility: CLINIC | Age: 48
End: 2023-05-30

## 2023-05-30 DIAGNOSIS — Z51.89 AFTERCARE: ICD-10-CM

## 2023-05-30 DIAGNOSIS — R29.898 SHOULDER WEAKNESS: ICD-10-CM

## 2023-05-30 DIAGNOSIS — M79.602 LEFT ARM PAIN: Primary | ICD-10-CM

## 2023-05-30 NOTE — PROGRESS NOTES
PT Evaluation     Today's date: 2023  Patient name: Pradip Brown  : 1975  MRN: [id number]  Referring provider: Erica Prather  Dx:   Encounter Diagnosis     ICD-10-CM    1  Left arm pain  M79 602       2  Aftercare  Z51 89       3  Shoulder weakness  R29 898                      Assessment  Assessment details: Pt at this time demonstrates good improvement in range and functional use for 13 weeks out from from surgical intervention  Very limited pain at this time, proceeded with formal strengthening today because he was on vacation last week  At this time formal focus should be strengthening and progression with lifting heavier items to make him more practical around home and vocational demands  Thank you very much for this kind and motivated referral       Impairments: abnormal or restricted ROM, activity intolerance, impaired physical strength, lacks appropriate home exercise program, pain with function, poor posture  and poor body mechanics  Understanding of Dx/Px/POC: good   Prognosis: good    Goals  STG 4 Weeks:  Decrease pain at worst to 4 -met  Improve range to per protocol -met  Improve strength to assess AROM -met  Independent with HEP -met  MTG 8 Weeks:  Decrease pain at worst to 3 -met  Improve range to per protocol -met  Improve strength to initiation of strengthening -week 12 -met  Independent with HEP -met  MTG 12 Weeks:  Decrease pain at worst to 2 -met  Improve range to per protocol -met  Improve strength to 4-/5  Independent with HEP -met  LTG 16 Weeks:  Decrease pain at worst to 1  Improve range to be within 5-10 of contralateral shoulder  Improve strength to 4/5  Able to perform all desired activities with minimal to nil symptom exacerbation  LTG 20 Weeks:  Assess prn    Plan  Plan details: 1-2x's a week due to high Copay     Patient would benefit from: skilled physical therapy  Planned modality interventions: cryotherapy and thermotherapy: hydrocollator packs  Planned therapy interventions: joint mobilization, manual therapy, activity modification, neuromuscular re-education, patient education, postural training, stretching, strengthening, therapeutic activities, therapeutic exercise, home exercise program, graded exercise, graded motor, graded activity, functional ROM exercises, flexibility, behavior modification and body mechanics training  Frequency: 2x week  Duration in weeks: 16  Treatment plan discussed with: patient        Subjective Evaluation    History of Present Illness  Date of onset: 3/7/2023  Date of surgery: 2023  Mechanism of injury: Pt presents today 13 weeks out from surgery  Feeling well, no complaints, mild irritation when laying on shoulder during sleep, but otherwise without complaint  Pt anxious to proceed with regular strengthening based components per protocol  Content with progression thus far  Pt follows up with Dr Jass Stoen on 23  Quality of life: good    Pain  Current pain ratin  At best pain ratin  At worst pain ratin  Quality: dull ache and sharp  Relieving factors: ice, medications and change in position  Progression: improved    Treatments  Previous treatment: injection treatment, medication and physical therapy  Patient Goals  Patient goals for therapy: increased strength, return to work, increased motion and decreased pain          Objective     Active Range of Motion   Left Shoulder   Flexion: 155 (PROM 165) degrees   Abduction: 150 (PROM 165) degrees   External rotation BTH: C7 (PROM 75)   Internal rotation BTB: T10 (PROM 70)     Right Shoulder   Flexion: 170 degrees   Abduction: 165 degrees   External rotation BTH: C7   Internal rotation BTB: T8     Additional Active Range of Motion Details  Forward head, rounded shoulders  B/L Sensation intact to light touch C3,4,5,6,7,8,T1,T2   UE strength  L flex 3+ abd 3+ er 3+ ir  3+ R flex abd er IR all  5/5     CS ROM  Flex Nil / Protraction Nil  Retraction Min  Ext Min  SB /Rot R "Min  SB / Rot L Full  Joint Mobility  Palpation  Sts  Elbow ROM Full B/L               Precautions: L shoulder RC repair + Tenodesis with Dr Pavel Lynch on 2/27/23    [employer]  Access Code: [email or web address]      Manuals 3/7 03/14 3/21 3/28 4/4 4/11 4/18 5/2 5/16 5/30   PROM MT per protocol 10 min 10 min 10 min 10 min 10 min 10 min 10 min 10 min 10 min 10 min                                          Neuro Re-Ed             Education and progression 10 min 10 min            Table Slides Flex abd ER    3\" x 10  3\"  x 10 3\" x 10      Supine Wand flex     nv  10x 10x 10x 10 x 10x   AROM Flex, Abd ER supine     Week 5-6 trial pending tolerance  X 15 X 15 X 20 X 30 1# x 20   Standing Flex Scap Abd       nv 3 x 5 2 x 10  3  x10 1# x 20   Tband Row + Ext         nv GTB 2 x 10   Tband ER + IR         nv RTB 2  x10   Ther Ex             CS ROM 3\" x 10 3\"x10 3\" x 10 3\" x 10  3\" x 20       Scap Add 3\" x 10 3\"x10 3\" x 10 3\" x 10  3\" x 20       Elbow PROM 3\" x 20 3\"x20 3\" x 20 3\" x 20  3\" x 20 3\" x 20 3\" x 30 3\" x 10 3\" x 10   Pendulums CW/CCW 2 x 10 2x10 2  x10 2 x 10 3 x 10  3 x 10       Gripping 3 mins YTB  3 min  hold          Pulleys      3 min 3 min 3 min 3 min 3 min                IR ST with Strap       nv?  AROM 1-2\" x 10 AROM 1-2\" 1-\" x 10   Ther Activity                                                                              Modalities             CP prn to L shoulder      dec                                   "

## 2023-06-08 ENCOUNTER — OFFICE VISIT (OUTPATIENT)
Dept: OBGYN CLINIC | Facility: CLINIC | Age: 48
End: 2023-06-08
Payer: COMMERCIAL

## 2023-06-08 VITALS
SYSTOLIC BLOOD PRESSURE: 135 MMHG | BODY MASS INDEX: 33.1 KG/M2 | HEIGHT: 72 IN | HEART RATE: 69 BPM | WEIGHT: 244.4 LBS | DIASTOLIC BLOOD PRESSURE: 80 MMHG

## 2023-06-08 DIAGNOSIS — Z47.89 AFTERCARE FOLLOWING SURGERY OF THE MUSCULOSKELETAL SYSTEM: Primary | ICD-10-CM

## 2023-06-08 PROCEDURE — 99213 OFFICE O/P EST LOW 20 MIN: CPT | Performed by: ORTHOPAEDIC SURGERY

## 2023-06-08 NOTE — PROGRESS NOTES
"[address] [facility] [address]  [id number]  1975    ORTHOPAEDIC SURGERY OUTPATIENT NOTE  6/8/2023      HISTORY:  50 y o  male presents 3 months status post left shoulder arthroscopy, rotator cuff tear, biceps tenodesis  He is working with physical therapy  He feels well  He reports pain 0 out of 10 intermittent sometimes 1 out of 10 with certain movements  SANE score 85%      Past Medical History:   Diagnosis Date   • Anesthesia     \"woke up during procedure\"       Past Surgical History:   Procedure Laterality Date   • EYE SURGERY      lasik   • FOOT SURGERY Bilateral     hammer toe   • FOREARM SURGERY Right    • HAND SURGERY Left    • ID NEUROPLASTY &/TRANSPOS MEDIAN NRV CARPAL TUNNE Left 8/20/2019    Procedure: CARPAL TUNNEL RELEASE;  Surgeon: Samina Quevedo MD;  Location: [facility]  MAIN OR;  Service: Orthopedics   • ID NEUROPLASTY &/TRANSPOS MEDIAN NRV CARPAL [place] Right 12/4/2020    Procedure: RELEASE CARPAL TUNNEL;  Surgeon: Samina Quevedo MD;  Location:  MAIN OR;  Service: Orthopedics   • ID SURGICAL ARTHROSCOPY SHOULDER W/ROTATOR CUFF RPR Left 2/27/2023    Procedure: ARTHROSCOPY SHOULDER- left, rotator cuff repair, biceps tenodesis and all necessary procedures;  Surgeon: Ronald Ferguson;  Location:  MAIN OR;  Service: Orthopedics   • SHOULDER SURGERY Right     x2       Social History     Socioeconomic History   • Marital status: Single     Spouse name: Not on file   • Number of children: Not on file   • Years of education: Not on file   • Highest education level: Not on file   Occupational History   • Not on file   Tobacco Use   • Smoking status: Every Day     Packs/day: 0 50     Types: Cigarettes   • Smokeless tobacco: Never   • Tobacco comments:     last cigarette at 0644   Vaping Use   • Vaping Use: Never used   Substance and Sexual Activity   • Alcohol use: Yes " Alcohol/week: 4 0 standard drinks of alcohol     Types: 4 Cans of beer per week     Comment: occasionally   • Drug use: Never   • Sexual activity: Not on file   Other Topics Concern   • Not on file   Social History Narrative   • Not on file     Social Determinants of Health     Financial Resource Strain: Not on file   Food Insecurity: Not on file   Transportation Needs: Not on file   Physical Activity: Not on file   Stress: Not on file   Social Connections: Not on file   Intimate Partner Violence: Not on file   Housing Stability: Not on file       Family History   Problem Relation Age of Onset   • Diabetes Father    • Diabetes Paternal Grandmother         Patient's Medications   New Prescriptions    No medications on file   Previous Medications    NICOTINE (NICODERM CQ) 21 MG/24 HR TD 24 HR PATCH    Place 1 patch on the skin over 24 hours every 24 hours   Modified Medications    No medications on file   Discontinued Medications    No medications on file       Allergies   Allergen Reactions   • Adhesive [Medical Tape] Hives   • Penicillins Rash     Pt reported rash on left side ear, neck, arm, both thighs, right arm and chest after taking 2 days of PCN  No resp distress or swelling of lips, tongue or throat  Rx'd Benadryl  /80 (BP Location: Right arm, Patient Position: Sitting, Cuff Size: Standard)   Pulse 69   Ht 6' (1 829 m)   Wt 111 kg (244 lb 6 4 oz)   BMI 33 15 kg/m²      REVIEW OF SYSTEMS:  Constitutional: Negative  HEENT: Negative  Respiratory: Negative  Skin: Negative  Neurological: Negative  Psychiatric/Behavioral: Negative  Musculoskeletal: Negative except for that mentioned in the HPI      /80 (BP Location: Right arm, Patient Position: Sitting, Cuff Size: Standard)   Pulse 69   Ht 6' (1 829 m)   Wt 111 kg (244 lb 6 4 oz)   BMI 33 15 kg/m²   Gen: No acute distress, resting comfortably in bed  HEENT: Eyes clear, moist mucus membranes, hearing intact  Respiratory: No audible wheezing or stridor  Cardiovascular: Well Perfused peripherally, 2+ distal pulse  Abdomen: nondistended, no peritoneal signs     PHYSICAL EXAM:    LEFT SHOULDER:    Appearance: Normal    Forward flexion:   180 degrees   Abduction:  90 degrees   External rotation at 0 degrees:   50 degrees   Internal rotation: T12     STRENGTH:  Forward flexion:  4/5   External rotation:  4+/5   Internal rotation:  5/5       Radial/median/ulnar nerve intact    <2 sec cap refill          IMAGING: No new imaging    ASSESSMENT AND PLAN:  50 y o  male a month status post right shoulder arthroscopy with rotator cuff repair and biceps tenodesis  He is doing very well  He continue work on strengthening with physical therapy  He will progress with activities  I will see him back in 6 weeks       Scribe Attestation      I,:  Estelita Severance, PA-C am acting as a scribe while in the presence of the attending physician :       I,:  Celena Lennon personally performed the services described in this documentation    as scribed in my presence :

## 2023-06-13 ENCOUNTER — APPOINTMENT (OUTPATIENT)
Dept: PHYSICAL THERAPY | Facility: CLINIC | Age: 48
End: 2023-06-13
Payer: COMMERCIAL

## 2023-06-13 ENCOUNTER — OFFICE VISIT (OUTPATIENT)
Dept: SLEEP CENTER | Facility: CLINIC | Age: 48
End: 2023-06-13
Payer: COMMERCIAL

## 2023-06-13 VITALS
OXYGEN SATURATION: 97 % | HEIGHT: 72 IN | BODY MASS INDEX: 33.18 KG/M2 | DIASTOLIC BLOOD PRESSURE: 78 MMHG | WEIGHT: 245 LBS | SYSTOLIC BLOOD PRESSURE: 122 MMHG | HEART RATE: 80 BPM

## 2023-06-13 DIAGNOSIS — G47.20 CIRCADIAN RHYTHM SLEEP DISORDER: ICD-10-CM

## 2023-06-13 DIAGNOSIS — Z72.820 SLEEP DEPRIVATION: ICD-10-CM

## 2023-06-13 DIAGNOSIS — G47.19 EXCESSIVE DAYTIME SLEEPINESS: ICD-10-CM

## 2023-06-13 DIAGNOSIS — G47.00 INSOMNIA, UNSPECIFIED TYPE: ICD-10-CM

## 2023-06-13 DIAGNOSIS — G47.33 OBSTRUCTIVE SLEEP APNEA: Primary | ICD-10-CM

## 2023-06-13 PROCEDURE — 99204 OFFICE O/P NEW MOD 45 MIN: CPT | Performed by: NURSE PRACTITIONER

## 2023-06-13 RX ORDER — METHYLPREDNISOLONE 4 MG/1
TABLET ORAL
COMMUNITY
Start: 2023-05-16

## 2023-06-13 NOTE — PROGRESS NOTES
Consultation - [address] Ayaan Moses, 1975, MRN: [id number]    6/13/2023        Reason for Consult / Principal Problem:  Excessive daytime sleepiness  Evaluation of possible Obstructive Sleep Apnea  Insomnia       Thank you for the opportunity of participating in the evaluation and care of this patient in the Sleep Clinic at [facility]  Subjective:     HPI: Missy Steward is a 50y o  year old male  He presents for a consultation regarding concern of possible obstructive sleep apnea  He reports that he snores loudly, gasps, chokes and has had witnessed periods of apnea  He was advised to have a sleep study many years ago, but did not follow through with testing  He recently had shoulder surgery and was again advised to have testing for sleep apnea  He wakes up several times per night and has difficulty returning to sleep  He becomes frustrated and goes outside to smoke a cigarette  He may be awake for 60-90 minutes  He estimates that when he is working his full time and part time jobs, he may only get 3 hours of sleep per night  He reports that he used trazodone and ambien in the past to help with insomnia  Trazodone didn't help him initiate sleep and he felt very groggy for hours upon awakening  Ambien worked well when he took 2 tablets, although he recalls having some complex sleep behaviors, including text messaging people during sleep  Comorbid conditions:  Obesity      Pertinent symptoms:  snoring, nocturnal choking, nocturnal gasping, witnessed apneas, excessive daytime sleepiness, [place] 17, chronic or am headache and unrefreshing sleep      Sleep Study Results:  No prior sleep study    Employment:  He currently works full time as a [employer], M-Sat between the hours of 9:00am-6:15pm and a part time job in security, working a variety of hours, including 11:00pm-9:00am 7 days per week      Sleep Schedule: Bedtime:  9:00pm and 7:30-8:00pm when working night shift      Latency: It can take up to 3 hours      Wakeup time:  4:00am, when working night shift, he wakes up at 10:00pm-10:30pm  Awakenings:       Frequency:  1-2 times      Causes: For urination, unknown causes      Duration:  Has difficulty returning to sleep    Daytime Sleepiness / Inappropriate Sleep:       Most severe:  2:00pm       Naps :  He does not routinely take naps, but may nap briefly at work      Time:  Any time      Duration:  A few minutes Naps are not refreshing in quality      Inappropriate drowsiness / sleep:  He may doze when sedentary, especially while watching TV    Snoring:  He snores loudly with gasping    Apnea:  He has witnessed apnea    Change in Weight:  He intentionally lost 20 lbs over the past year with healthy changes to diet  Restless Leg Syndrome:  no clinical symptoms consistent with this diagnosis     Other Complaints:  He talks in his sleep occasionally  No reports of sleep walking, sleep paralysis or hallucinations surrounding sleep  He occasionally wakes up with headaches  No reports of bruxism  Social History:      Caffeine:  40 ounces of coffee       Tobacco:   reports that he has been smoking cigarettes  He has been smoking an average of  5 packs per day  He has never used smokeless tobacco      E-cig/Vaping:    E-Cigarette/Vaping   • E-Cigarette Use Never User       E-Cigarette/Vaping Substances   • Nicotine No    • THC No    • CBD No    • Flavoring No    • Other No    • Unknown No          Alcohol:   reports current alcohol use of about 4 0 standard drinks of alcohol per week  1-2 drinks 4/7 days      Drugs:   reports no history of drug use         The review of systems and following portions of the patient's history were reviewed and updated as appropriate: allergies, current medications, past family history, past medical history, past social history, past surgical history and problem list         Objective:       Vitals:    06/13/23 0749   BP: 122/78   BP Location: Left arm   Patient Position: Sitting   Cuff Size: Standard   Pulse: 80   SpO2: 97%   Weight: 111 kg (245 lb)   Height: 6' (1 829 m)     Body mass index is 33 23 kg/m²  Neck Circumference: 18  Culver Sleepiness Scale: Total score: 17      Current Outpatient Medications:   •  methylPREDNISolone 4 MG tablet therapy pack, use as directed for 6 days, Disp: , Rfl:   •  nicotine (NICODERM CQ) 21 mg/24 hr TD 24 hr patch, Place 1 patch on the skin over 24 hours every 24 hours (Patient not taking: Reported on 4/20/2023), Disp: 28 patch, Rfl: 0    Physical Exam  General Appearance:   Alert, cooperative, no distress, appears stated age, obese     Head:   Normocephalic, without obvious abnormality, atraumatic     Eyes:   Conjunctiva/corneas clear          Nose:  Nares normal, septum midline, mucosa normal, no drainage or sinus tenderness           Throat:  Lips, teeth and gums normal; tongue normal in size and midline in position; mucosa moist with low-lying soft palatal tissue, uvula thick at the base and only partially visualized, tonsils not visualized, Mallampati class 3-4       Neck:  Supple, short, symmetrical, trachea midline, no adenopathy; no thyromegaly noted, no carotid bruit or JVD     Lungs:      Clear to auscultation bilaterally, respirations unlabored     Heart:   Regular rate and rhythm, S1 and S2 normal, no murmur, rub or gallop       Extremities:  Extremities normal, atraumatic, no cyanosis or edema       Skin:  Skin color, texture, turgor normal, no rashes or lesions       Neurologic:  No focal deficits noted  ASSESSMENT / PLAN     1  Obstructive sleep apnea  Ambulatory Referral to Sleep Medicine    Home Study      2  Insomnia, unspecified type  Home Study      3  Excessive daytime sleepiness  Home Study      4  Circadian rhythm sleep disorder  Home Study    shift work      5   Sleep deprivation  Home Study Counseling / Coordination of Care  I have spent a total time of 55 minutes on 06/13/23 in caring for this patient including Risks and benefits of tx options, Instructions for management, Patient and family education, Importance of tx compliance, Risk factor reductions, Impressions, Counseling / Coordination of care, Documenting in the medical record, Reviewing / ordering tests, medicine, procedures   and Obtaining or reviewing history    Today we discussed the anatomy and physiology of the upper airway  I pointed out how changes in this region can result in both snoring and abnormal breathing events including apneas and hypopneas  I explained the most common co-morbidities of untreated sleep apnea  After this we talked about some forms of treatment including application of positive airway pressure, mandibular advancement devices and surgery  He would consider use of PAP therapy if ANGELICA is confirmed  In order to evaluate the possibility of Obstructive Sleep Apnea as a cause of the patient's symptoms, a home sleep study will be completed to identify the presence or absence of abnormal nocturnal breathing  If significant abnormal nocturnal breathing is detected, nasal CPAP will be titrated to find the optimum pressure needed to maintain upper airway patency during sleep  This may be accomplished using APAP or may require an in lab titration study  Following testing, the patient will return to the Sleep [facility] for set up of CPAP equipment with follow up visit to review compliance and effectiveness of treatment 31-91 days after set up  We discussed the importance of increasing total sleep time, with a goal of 7-9 hours per night  We discussed that caffeine, alcohol and nicotine can worsen sleep quality and decreasing use may help improve sleep as well  The following instructions have been given to the patient today:    Patient Instructions   1  Schedule home sleep study  2   Schedule "set up of CPAP equipment  3  Schedule compliance visit 31-91 days after beginning use of equipment   4  Work on increasing total sleep time with a goal of 7-9 hours        Nursing Support:  When: Monday through Friday 7A-5PM except holidays  Where: Our direct line is [phone]  If you are having a true emergency please call 911  In the event that the line is busy or it is after hours please leave a voice message and we will return your call  Please speak clearly, leaving your full name, birth date, best number to reach you and the reason for your call  Medication refills: We will need the name of the medication, the dosage, the ordering provider, whether you get a 30 or 90 day refill, and the pharmacy name and address  Medications will be ordered by the provider only  Nurses cannot call in prescriptions  Please allow 7 days for medication refills  Physician requested updates: If your provider requested that you call with an update after starting medication, please be ready to provide us the medication and dosage, what time you take your medication, the time you attempt to fall asleep, time you fall asleep, when you wake up, and what time you get out of bed  Sleep Study Results: We will contact you with sleep study results and/or next steps after the physician has reviewed your testing  Steven Littlejohn, [facility]      Portions of the record may have been created with voice recognition software  Occasional wrong word or \"sound a like\" substitutions may have occurred due to the inherent limitations of voice recognition software  Read the chart carefully and recognize, using context, where substitutions have occurred                 "

## 2023-06-13 NOTE — PATIENT INSTRUCTIONS
Schedule home sleep study  Schedule set up of CPAP equipment  Schedule compliance visit 31-91 days after beginning use of equipment   Work on increasing total sleep time with a goal of 7-9 hours        Nursing Support:  When: Monday through Friday 7A-5PM except holidays  Where: Our direct line is [phone]  If you are having a true emergency please call 911  In the event that the line is busy or it is after hours please leave a voice message and we will return your call  Please speak clearly, leaving your full name, birth date, best number to reach you and the reason for your call  Medication refills: We will need the name of the medication, the dosage, the ordering provider, whether you get a 30 or 90 day refill, and the pharmacy name and address  Medications will be ordered by the provider only  Nurses cannot call in prescriptions  Please allow 7 days for medication refills  Physician requested updates: If your provider requested that you call with an update after starting medication, please be ready to provide us the medication and dosage, what time you take your medication, the time you attempt to fall asleep, time you fall asleep, when you wake up, and what time you get out of bed  Sleep Study Results: We will contact you with sleep study results and/or next steps after the physician has reviewed your testing

## 2023-06-15 ENCOUNTER — HOSPITAL ENCOUNTER (OUTPATIENT)
Dept: SLEEP CENTER | Facility: CLINIC | Age: 48
Discharge: HOME | End: 2023-06-15
Payer: COMMERCIAL

## 2023-06-15 DIAGNOSIS — G47.33 OBSTRUCTIVE SLEEP APNEA: ICD-10-CM

## 2023-06-15 DIAGNOSIS — G47.00 INSOMNIA, UNSPECIFIED TYPE: ICD-10-CM

## 2023-06-15 DIAGNOSIS — G47.20 CIRCADIAN RHYTHM SLEEP DISORDER: ICD-10-CM

## 2023-06-15 DIAGNOSIS — Z72.820 SLEEP DEPRIVATION: ICD-10-CM

## 2023-06-15 DIAGNOSIS — G47.19 EXCESSIVE DAYTIME SLEEPINESS: ICD-10-CM

## 2023-06-15 PROCEDURE — G0399 HOME SLEEP TEST/TYPE 3 PORTA: HCPCS

## 2023-06-16 ENCOUNTER — APPOINTMENT (OUTPATIENT)
Dept: PHYSICAL THERAPY | Facility: CLINIC | Age: 48
End: 2023-06-16
Payer: COMMERCIAL

## 2023-06-19 NOTE — PROGRESS NOTES
Home Sleep Study Documentation    HOME STUDY DEVICE: Noxturnal no                                           Esmer G3 yes      Pre-Sleep Home Study:    Set-up and instructions performed by: Gilberto Jeong performed demonstration for Patient: yes    Return demonstration performed by Patient: yes    Written instructions provided to Patient: yes    Patient signed consent form: yes        Post-Sleep Home Study:    Additional comments by Patient: None    Home Sleep Study Failed:no:    Failure reason: N/A    Reported or Detected: N/A    Scored by: Clorinda Kussmaul

## 2023-06-21 PROCEDURE — 95806 SLEEP STUDY UNATT&RESP EFFT: CPT | Performed by: PSYCHIATRY & NEUROLOGY

## 2023-06-22 ENCOUNTER — OFFICE VISIT (OUTPATIENT)
Dept: PHYSICAL THERAPY | Facility: CLINIC | Age: 48
End: 2023-06-22
Payer: COMMERCIAL

## 2023-06-22 DIAGNOSIS — G47.00 INSOMNIA, UNSPECIFIED TYPE: ICD-10-CM

## 2023-06-22 DIAGNOSIS — G47.33 OBSTRUCTIVE SLEEP APNEA: Primary | ICD-10-CM

## 2023-06-22 DIAGNOSIS — Z51.89 AFTERCARE: ICD-10-CM

## 2023-06-22 DIAGNOSIS — R29.898 SHOULDER WEAKNESS: ICD-10-CM

## 2023-06-22 DIAGNOSIS — M79.602 LEFT ARM PAIN: Primary | ICD-10-CM

## 2023-06-22 DIAGNOSIS — G47.19 EXCESSIVE DAYTIME SLEEPINESS: ICD-10-CM

## 2023-06-22 PROCEDURE — 97140 MANUAL THERAPY 1/> REGIONS: CPT | Performed by: PHYSICAL THERAPIST

## 2023-06-22 PROCEDURE — 97112 NEUROMUSCULAR REEDUCATION: CPT | Performed by: PHYSICAL THERAPIST

## 2023-06-22 NOTE — PROGRESS NOTES
"Daily Note     Today's date: 2023  Patient name: Carlos Manuel Roberts  : 1975  MRN: [id number]  Referring provider: Richelle Griffin  Dx:   Encounter Diagnosis     ICD-10-CM    1  Left arm pain  M79 602       2  Aftercare  Z51 89       3  Shoulder weakness  R29 898                      Subjective: Pt presents today for the first time since 23 stating he is feeling really well  Awaiting clearance from physician which is scheduled next month 23  Objective: See treatment diary below      Assessment:  Progressing excellently at this time, no issues with endurance, strength progression and tolerance to activity  RE scheduled on 23  Precautions: L shoulder RC repair + Tenodesis with Dr Monica Barrientos on 23    [employer]  Access Code: [email or web address]      Manuals   6/22 03/14 3/21 3/28 4/4 4/11 4/18 5/2 5/16 5/30   PROM MT per protocol 10 min 10 min 10 min 10 min 10 min 10 min 10 min 10 min 10 min 10 min                                          Neuro Re-Ed             Education and progression  10 min            Table Slides Flex abd ER    3\" x 10  3\"  x 10 3\" x 10      Supine Wand flex     nv  10x 10x 10x 10 x 10x   AROM Flex, Abd ER supine  2# x 20   Week 5-6 trial pending tolerance  X 15 X 15 X 20 X 30 1# x 20   Standing Flex Scap Abd  2#  X 20     nv 3 x 5 2 x 10  3  x10 1# x 20   Tband Row + Ext Blue 2 x 10        nv GTB 2 x 10   Tband ER + IR Blue 2 x 10        nv RTB 2  x10   Ther Ex             CS ROM  3\"x10 3\" x 10 3\" x 10  3\" x 20       Scap Add  3\"x10 3\" x 10 3\" x 10  3\" x 20       Elbow PROM  3\"x20 3\" x 20 3\" x 20  3\" x 20 3\" x 20 3\" x 30 3\" x 10 3\" x 10   Pendulums CW/CCW  2x10 2  x10 2 x 10 3 x 10  3 x 10       Gripping  YTB  3 min  hold          Pulleys 3 min     3 min 3 min 3 min 3 min 3 min                IR ST with Strap 1\" x 10      nv?  AROM 1-2\" x 10 AROM 1-2\" 1-\" x 10   Ther Activity                                                                            " Modalities             CP prn to L shoulder      dec

## 2023-06-27 ENCOUNTER — APPOINTMENT (OUTPATIENT)
Dept: PHYSICAL THERAPY | Facility: CLINIC | Age: 48
End: 2023-06-27
Payer: COMMERCIAL

## 2023-07-14 ENCOUNTER — EVALUATION (OUTPATIENT)
Dept: PHYSICAL THERAPY | Facility: CLINIC | Age: 48
End: 2023-07-14
Payer: COMMERCIAL

## 2023-07-14 DIAGNOSIS — Z51.89 AFTERCARE: ICD-10-CM

## 2023-07-14 DIAGNOSIS — R29.898 SHOULDER WEAKNESS: ICD-10-CM

## 2023-07-14 DIAGNOSIS — M79.602 LEFT ARM PAIN: Primary | ICD-10-CM

## 2023-07-14 PROCEDURE — 97110 THERAPEUTIC EXERCISES: CPT | Performed by: PHYSICAL THERAPIST

## 2023-07-14 PROCEDURE — 97112 NEUROMUSCULAR REEDUCATION: CPT | Performed by: PHYSICAL THERAPIST

## 2023-07-14 NOTE — PROGRESS NOTES
PT Evaluation     Today's date: 2023  Patient name: Jodi Meneses  : 1975  MRN: [id number]  Referring provider: Perry Ritter  Dx:   Encounter Diagnosis     ICD-10-CM    1. Left arm pain  M79.602       2. Aftercare  Z51.89       3. Shoulder weakness  R29.898                      Assessment  Assessment details: Pt at this time has improved strength, range, flexibility as well as overall tolerance to activity. At this time pt has achieved all goals sought out for him as well as by him and he is likely safe to DC to [facility]. If he is to have a decline in any form he is welcome to return as needed. Thank you very much for this kind and motivated referral.    Impairments: abnormal or restricted ROM, activity intolerance, impaired physical strength, lacks appropriate home exercise program, pain with function, poor posture  and poor body mechanics  Understanding of Dx/Px/POC: good   Prognosis: good    Goals  STG 4 Weeks:  Decrease pain at worst to 4 -met  Improve range to per protocol -met  Improve strength to assess AROM -met  Independent with HEP -met  MTG 8 Weeks:  Decrease pain at worst to 3 -met  Improve range to per protocol -met  Improve strength to initiation of strengthening -week 12 -met  Independent with HEP -met  MTG 12 Weeks:  Decrease pain at worst to 2 -met  Improve range to per protocol -met  Improve strength to 4-/5  Independent with HEP -met  LTG 16 Weeks:  Decrease pain at worst to 1 -met  Improve range to be within 5-10 of contralateral shoulder -met  Improve strength to 4/5.  -met  Able to perform all desired activities with minimal to nil symptom exacerbation -met  LTG 20 Weeks:  Assess prn    Plan  Plan details: 1-2x's a week due to high Copay.    Planned modality interventions: cryotherapy and thermotherapy: hydrocollator packs  Planned therapy interventions: joint mobilization, manual therapy, activity modification, neuromuscular re-education, patient education, postural training, stretching, strengthening, therapeutic activities, therapeutic exercise, home exercise program, graded exercise, graded motor, graded activity, functional ROM exercises, flexibility, behavior modification and body mechanics training  Duration in weeks: 10  Treatment plan discussed with: patient        Subjective Evaluation    History of Present Illness  Date of onset: 3/7/2023  Date of surgery: 2023  Mechanism of injury: Pt presents today s/p 19 almost 29 weeks post operative. He is feeling well, no concerns, no issues with his daily activities, he feels as though he is ready to be DC to [facility] and full released back to all vocational demands. Minimal to nil pain, follows up with surgeon on 23. Very content with his current progression. Quality of life: good    Patient Goals  Patient goals for therapy: increased strength, return to work, increased motion and decreased pain    Pain  Current pain ratin  At best pain ratin  At worst pain ratin  Quality: dull ache and sharp  Relieving factors: ice, medications and change in position  Progression: improved    Treatments  Previous treatment: injection treatment, medication and physical therapy        Objective     Active Range of Motion   Left Shoulder   Flexion: 170 (PROM WFL) degrees   Abduction: 165 (PROM WFL) degrees   External rotation BTH: T1 (PROM WFL)   Internal rotation BTB: T8 (PROM WFL)     Right Shoulder   Flexion: 170 degrees   Abduction: 165 degrees   External rotation BTH: C7   Internal rotation BTB: T8     Additional Active Range of Motion Details  Forward head, rounded shoulders  B/L Sensation intact to light touch C3,4,5,6,7,8,T1,T2   UE strength  L flex 3+ abd 3+ er 3+ ir  All 5/5.   3+ R flex abd er IR all  5/5.    CS ROM  Flex Nil / Protraction Nil  Retraction Min  Ext Min  SB /Rot R Min  SB / Rot L Full  Joint Mobility  Palpation  Sts  Elbow ROM Full B/L               Precautions: L shoulder RC repair + Tenodesis with  Real Rattler on 2/27/23    [email or web address]  Access Code: [email or web address]      Manuals   6/22 7/14 3/21 3/28 4/4 4/11 4/18 5/2 5/16 5/30   PROM MT per protocol 10 min  10 min 10 min 10 min 10 min 10 min 10 min 10 min 10 min                                          Neuro Re-Ed             Education and progression  10 min assessment            Table Slides Flex abd ER    3" x 10  3"  x 10 3" x 10      Supine Wand flex     nv  10x 10x 10x 10 x 10x   AROM Flex, Abd ER supine  2# x 20   Week 5-6 trial pending tolerance. X 15 X 15 X 20 X 30 1# x 20   Standing Flex Scap Abd  2#  X 20     nv 3 x 5 2 x 10  3  x10 1# x 20   Tband Row + Ext Blue 2 x 10 review       nv GTB 2 x 10   Tband ER + IR Blue 2 x 10 review       nv RTB 2  x10   Ther Ex             CS ROM   3" x 10 3" x 10  3" x 20       Scap Add   3" x 10 3" x 10  3" x 20       Elbow PROM   3" x 20 3" x 20  3" x 20 3" x 20 3" x 30 3" x 10 3" x 10   Pendulums CW/CCW   2  x10 2 x 10 3 x 10  3 x 10       Gripping   hold          Pulleys 3 min 3 min     3 min 3 min 3 min 3 min 3 min                IR ST with Strap 1" x 10 1" x 10     nv?  AROM 1-2" x 10 AROM 1-2" 1-" x 10   Ther Activity                                                                              Modalities             CP prn to L shoulder      dec

## 2023-07-26 ENCOUNTER — OFFICE VISIT (OUTPATIENT)
Dept: OBGYN CLINIC | Facility: CLINIC | Age: 48
End: 2023-07-26
Payer: COMMERCIAL

## 2023-07-26 VITALS
WEIGHT: 250.2 LBS | DIASTOLIC BLOOD PRESSURE: 73 MMHG | SYSTOLIC BLOOD PRESSURE: 132 MMHG | HEIGHT: 72 IN | HEART RATE: 76 BPM | BODY MASS INDEX: 33.89 KG/M2

## 2023-07-26 DIAGNOSIS — Z47.89 AFTERCARE FOLLOWING SURGERY OF THE MUSCULOSKELETAL SYSTEM: Primary | ICD-10-CM

## 2023-07-26 PROCEDURE — 99213 OFFICE O/P EST LOW 20 MIN: CPT | Performed by: ORTHOPAEDIC SURGERY

## 2023-07-26 NOTE — PROGRESS NOTES
[address] [facility]  [address] [facility] [id number] [phone]       Willam Olszewski  [id number]  1975    ORTHOPAEDIC SURGERY OUTPATIENT NOTE  7/26/2023      HISTORY:  50 y.o. male presents for follow-up on his left shoulder arthroscopy with rotator cuff repair and biceps tenodesis. He is doing very well. Reports 0 out of 10 pain. SANE 90%  Past Medical History:   Diagnosis Date   • Anesthesia     "woke up during procedure"   • Obstructive sleep apnea 6/13/2023       Past Surgical History:   Procedure Laterality Date   • EYE SURGERY      lasik   • FOOT SURGERY Bilateral     hammer toe   • FOREARM SURGERY Right    • HAND SURGERY Left    • RI NEUROPLASTY &/TRANSPOS MEDIAN NRV CARPAL TUNNE Left 8/20/2019    Procedure: CARPAL TUNNEL RELEASE;  Surgeon: Meron De Oliveira MD;  Location: [facility];  Service: Orthopedics   • RI NEUROPLASTY &/TRANSPOS MEDIAN NRV CARPAL Dolores Celis Right 12/4/2020    Procedure: RELEASE CARPAL TUNNEL;  Surgeon: Meron De Oliveira MD;  Location:  MAIN OR;  Service: Orthopedics   • RI SURGICAL ARTHROSCOPY SHOULDER W/ROTATOR CUFF RPR Left 2/27/2023    Procedure: ARTHROSCOPY SHOULDER- left, rotator cuff repair, biceps tenodesis and all necessary procedures;  Surgeon: Sean Wilkerson;  Location:  MAIN OR;  Service: Orthopedics   • SHOULDER SURGERY Right     x2       Social History     Socioeconomic History   • Marital status: Single     Spouse name: Not on file   • Number of children: Not on file   • Years of education: Not on file   • Highest education level: Not on file   Occupational History   • Not on file   Tobacco Use   • Smoking status: Every Day     Packs/day: 0.50     Types: Cigarettes   • Smokeless tobacco: Never   • Tobacco comments:     last cigarette at 0644   Vaping Use   • Vaping Use: Never used   Substance and Sexual Activity   • Alcohol use:  Yes     Alcohol/week: 4.0 standard drinks of alcohol Types: 4 Cans of beer per week     Comment: occasionally   • Drug use: Never   • Sexual activity: Not on file   Other Topics Concern   • Not on file   Social History Narrative   • Not on file     Social Determinants of Health     Financial Resource Strain: Not on file   Food Insecurity: Not on file   Transportation Needs: Not on file   Physical Activity: Not on file   Stress: Not on file   Social Connections: Not on file   Intimate Partner Violence: Not on file   Housing Stability: Not on file       Family History   Problem Relation Age of Onset   • Diabetes Father    • Diabetes Paternal Grandmother         Patient's Medications   New Prescriptions    No medications on file   Previous Medications    METHYLPREDNISOLONE 4 MG TABLET THERAPY PACK    use as directed for 6 days    NICOTINE (NICODERM CQ) 21 MG/24 HR TD 24 HR PATCH    Place 1 patch on the skin over 24 hours every 24 hours   Modified Medications    No medications on file   Discontinued Medications    No medications on file       Allergies   Allergen Reactions   • Adhesive [Medical Tape] Hives   • Penicillins Rash     Pt reported rash on left side ear, neck, arm, both thighs, right arm and chest after taking 2 days of PCN. No resp distress or swelling of lips, tongue or throat. Rx'd Benadryl. /73 (BP Location: Right arm, Patient Position: Sitting, Cuff Size: Adult)   Pulse 76   Ht 6' (1.829 m) Comment: verbal  Wt 113 kg (250 lb 3.2 oz)   BMI 33.93 kg/m²      REVIEW OF SYSTEMS:  Constitutional: Negative. HEENT: Negative. Respiratory: Negative. Skin: Negative. Neurological: Negative. Psychiatric/Behavioral: Negative. Musculoskeletal: Negative except for that mentioned in the HPI.     /73 (BP Location: Right arm, Patient Position: Sitting, Cuff Size: Adult)   Pulse 76   Ht 6' (1.829 m) Comment: verbal  Wt 113 kg (250 lb 3.2 oz)   BMI 33.93 kg/m²   Gen: No acute distress, resting comfortably in bed  HEENT: Eyes clear, moist mucus membranes, hearing intact  Respiratory: No audible wheezing or stridor  Cardiovascular: Well Perfused peripherally, 2+ distal pulse  Abdomen: nondistended, no peritoneal signs     PHYSICAL EXAM:    LEFT SHOULDER:    Appearance: Well-healed incisions    Forward flexion:   180 degrees   Abduction:  180 degrees   External rotation at 90 degrees abduction:   90 degrees   Internal rotation at 90 degrees abduction:  90 degrees   External rotation at 0 degrees:   70 degrees   Internal rotation: T7     STRENGTH:  Forward flexion:  4+/5   Abduction:  5/5   External rotation:  5/5   Internal rotation:  5/5       Radial/median/ulnar nerve intact    <2 sec cap refill          IMAGING: No new imaging    ASSESSMENT AND PLAN:  50 y.o. male status post left shoulder arthroscopy with rotator cuff repair and biceps tenodesis. He is now just about 5 months out and doing very well. We will release him full duty. He will follow up as needed.      Scribe Attestation      I,:  Colt Mccullough PA-C am acting as a scribe while in the presence of the attending physician.:       I,:  Taurus Powell personally performed the services described in this documentation    as scribed in my presence.:

## 2023-07-26 NOTE — LETTER
July 26, 2023     Patient: Kavitha Pacheco  YOB: 1975  Date of Visit: 7/26/2023      To Whom it May Concern:    Kavitha Pacheco is under my professional care. Darlene Castelan was seen in my office on 7/26/2023. Darlene Castelan may return to work without restrictions. If you have any questions or concerns, please don't hesitate to call.          Sincerely,          Estefani Silva        CC: No Recipients

## 2023-07-28 NOTE — TELEPHONE ENCOUNTER
Called and spoke w/pt and he has rash on left side ear, neck , arm, ; right arm, both thighs, and chest   Pt thinks it is a PCN allergy  Bendaryl 25mg 2 tabs 1 or 2 times a day since Thursday  He at first thought it was reaction to tape and it has gotten worse  Denies swelling to tongue, lips or throat  No breathing problems  Pt finished it last Wednesday  Has not spread any more  Did not notify PCP  Please advise on possible PCN allergy  Please advise if pt should go to PT tomorrow morning or wait until seen by Dr Hui Butt on Thursday? .

## 2023-08-10 NOTE — DISCHARGE INSTRUCTIONS
You have had surgery on your arm today, please read and follow the information below   Elevate your hand above your elbow during the next 24-48 hours to help with swelling   Place your hand and arm over your head with motion at your shoulder three times a day   Do not apply any cream/ointment/oil to your incisions including antibiotics   Do not soak your hands in standing water (dishwater, tubs, Jacuzzis, pools, etc) until given permission (typically 2-3 weeks after injury)  Call the office if you notice any:   Increased numbness or tingling of you hand or fingers that is not relieved with elevation   Increasing pain that is not controlled with medication   Difficulty chewing, breathing, swallowing   Chest pains or shortness of breath   Fever over 101 4 degrees    Additional Information  May remove dressings in 5-7 days and wash with soap and water  Wound will heal better the longer it is covered  Use a band-aid if needed once dressing is removed  Please no topical agents on the wound (I e  No neosporin, peroxide, etc)  Move your fingers 10 times per hour  Do not move any splinted fingers  , Ice to area 15 minutes each hour for 24 hours  , No sling is needed  For pain, please see your prescription for Norco, which should be taken as 1 tab, every 6 hours as needed  Please arrange for a follow-up in 10-14 days  Yes

## 2023-09-07 ENCOUNTER — HOSPITAL ENCOUNTER (OUTPATIENT)
Dept: RADIOLOGY | Facility: HOSPITAL | Age: 48
End: 2023-09-07
Attending: PODIATRIST
Payer: COMMERCIAL

## 2023-09-07 ENCOUNTER — OFFICE VISIT (OUTPATIENT)
Dept: PODIATRY | Facility: CLINIC | Age: 48
End: 2023-09-07
Payer: COMMERCIAL

## 2023-09-07 VITALS
HEART RATE: 76 BPM | SYSTOLIC BLOOD PRESSURE: 142 MMHG | HEIGHT: 72 IN | DIASTOLIC BLOOD PRESSURE: 82 MMHG | BODY MASS INDEX: 33.86 KG/M2 | WEIGHT: 250 LBS | OXYGEN SATURATION: 97 %

## 2023-09-07 DIAGNOSIS — R52 PAIN: ICD-10-CM

## 2023-09-07 DIAGNOSIS — M72.2 PLANTAR FASCIITIS OF LEFT FOOT: Primary | ICD-10-CM

## 2023-09-07 PROCEDURE — 73630 X-RAY EXAM OF FOOT: CPT

## 2023-09-07 PROCEDURE — 99213 OFFICE O/P EST LOW 20 MIN: CPT | Performed by: PODIATRIST

## 2023-09-07 RX ORDER — DICLOFENAC SODIUM 75 MG/1
75 TABLET, DELAYED RELEASE ORAL 2 TIMES DAILY
Qty: 30 TABLET | Refills: 1 | Status: SHIPPED | OUTPATIENT
Start: 2023-09-07

## 2023-09-07 NOTE — PROGRESS NOTES
Assessment/Plan:     X-rays of the patient's left foot taken today were personally reviewed and interpreted. They were significant for a plantar calcaneal spur. There are no fractures or dislocations identified. Contracture deformities are noted to the lesser digits. The patient's clinical examination is significant for mild to moderate tenderness palpation to the plantar medial tubercle of the left os calcis. There is no erythema nor edema nor calor noted. There is no tenderness lateral squeeze of the left calcaneus. There are no open lesions or signs of infection. The patient's clinical examination is most consistent with a plantar fasciitis of the left heel. Etiology and treatment goals were discussed with the patient. We will start with conservative therapy. We will start him on home stretches that he can perform for the plantar fascia. I will also start him on an oral course of diclofenac 75 mg enteric-coated to be taken twice daily for 14 days then as needed only. We discussed the importance of good supportive shoe gear with well-formed arches. His old custom orthotics still aligned well and seem to be in fairly decent shape, although they can probably use a new top-cover. Recommend follow-up in 3 to 4 weeks. If he does not show any improvement, we can consider injection therapy at that time. Diagnoses and all orders for this visit:    Plantar fasciitis of left foot  -     diclofenac (VOLTAREN) 75 mg EC tablet; Take 1 tablet (75 mg total) by mouth 2 (two) times a day    Pain  -     X-ray foot left 3+ views; Future  -     diclofenac (VOLTAREN) 75 mg EC tablet; Take 1 tablet (75 mg total) by mouth 2 (two) times a day          Subjective:     Patient ID: Jamshid Burgos is a 50 y.o. male. The patient presents today with a chief complaint of left plantar heel pain that has been present for several weeks. He cannot recall any injury or trauma to the left foot.   He states that pain is worse when getting up after periods of rest.  The discomfort does tend to ease up after he has been on it for a while but starts up again when he gets up. He is currently wearing custom orthotics. PAST MEDICAL HISTORY:  Past Medical History:   Diagnosis Date   • Anesthesia     "woke up during procedure"   • Obstructive sleep apnea 6/13/2023       PAST SURGICAL HISTORY:  Past Surgical History:   Procedure Laterality Date   • EYE SURGERY      lasik   • FOOT SURGERY Bilateral     hammer toe   • FOREARM SURGERY Right    • HAND SURGERY Left    • [place] NEUROPLASTY &/TRANSPOS MEDIAN NRV CARPAL TUNNE Left 8/20/2019    Procedure: CARPAL TUNNEL RELEASE;  Surgeon: Nicky Grover MD;  Location: [facility];  Service: Orthopedics   • [place] NEUROPLASTY &/TRANSPOS MEDIAN NRV CARPAL Rilla Massa Right 12/4/2020    Procedure: RELEASE CARPAL TUNNEL;  Surgeon: Nicky Grover MD;  Location:  MAIN OR;  Service: Orthopedics   • [place] SURGICAL ARTHROSCOPY SHOULDER W/ROTATOR CUFF RPR Left 2/27/2023    Procedure: ARTHROSCOPY SHOULDER- left, rotator cuff repair, biceps tenodesis and all necessary procedures;  Surgeon: Estefani Silva;  Location:  MAIN OR;  Service: Orthopedics   • SHOULDER SURGERY Right     x2        ALLERGIES:  Adhesive [medical tape] and Penicillins    MEDICATIONS:  Current Outpatient Medications   Medication Sig Dispense Refill   • diclofenac (VOLTAREN) 75 mg EC tablet Take 1 tablet (75 mg total) by mouth 2 (two) times a day 30 tablet 1   • methylPREDNISolone 4 MG tablet therapy pack use as directed for 6 days (Patient not taking: Reported on 7/26/2023)     • nicotine (NICODERM CQ) 21 mg/24 hr TD 24 hr patch Place 1 patch on the skin over 24 hours every 24 hours (Patient not taking: Reported on 4/20/2023) 28 patch 0     No current facility-administered medications for this visit.        SOCIAL HISTORY:  Social History     Socioeconomic History   • Marital status: Single     Spouse name: None   • Number of children: None • Years of education: None   • Highest education level: None   Occupational History   • None   Tobacco Use   • Smoking status: Every Day     Packs/day: 0.50     Types: Cigarettes   • Smokeless tobacco: Never   • Tobacco comments:     last cigarette at 0644   Vaping Use   • Vaping Use: Never used   Substance and Sexual Activity   • Alcohol use: Yes     Alcohol/week: 4.0 standard drinks of alcohol     Types: 4 Cans of beer per week     Comment: occasionally   • Drug use: Never   • Sexual activity: None   Other Topics Concern   • None   Social History Narrative   • None     Social Determinants of Health     Financial Resource Strain: Not on file   Food Insecurity: Not on file   Transportation Needs: Not on file   Physical Activity: Not on file   Stress: Not on file   Social Connections: Not on file   Intimate Partner Violence: Not on file   Housing Stability: Not on file        Review of Systems   Constitutional: Negative. HENT: Negative. Eyes: Negative. Respiratory: Negative. Cardiovascular: Negative. Endocrine: Negative. Musculoskeletal: Negative. Skin: Negative. Neurological: Negative. Hematological: Negative. Psychiatric/Behavioral: Negative. Objective:     Physical Exam  Vitals reviewed. Constitutional:       Appearance: Normal appearance. HENT:      Head: Normocephalic and atraumatic. Nose: Nose normal.   Eyes:      Conjunctiva/sclera: Conjunctivae normal.      Pupils: Pupils are equal, round, and reactive to light. Cardiovascular:      Pulses:           Dorsalis pedis pulses are 2+ on the left side. Posterior tibial pulses are 2+ on the left side. Pulmonary:      Effort: Pulmonary effort is normal.   Musculoskeletal:        Feet:    Feet:      Left foot:      Skin integrity: Skin integrity normal.      Comments:  The patient's clinical examination is significant for mild to moderate tenderness palpation to the plantar medial tubercle of the left os calcis. There is no erythema nor edema nor calor noted. There is no tenderness lateral squeeze of the left calcaneus. There are no open lesions or signs of infection. Skin:     General: Skin is warm. Capillary Refill: Capillary refill takes less than 2 seconds. Neurological:      General: No focal deficit present. Mental Status: He is alert and oriented to person, place, and time. Psychiatric:         Mood and Affect: Mood normal.         Behavior: Behavior normal.         Thought Content:  Thought content normal.

## 2023-09-14 ENCOUNTER — HOSPITAL ENCOUNTER (OUTPATIENT)
Dept: SLEEP CENTER | Facility: CLINIC | Age: 48
Discharge: HOME | End: 2023-09-14
Payer: COMMERCIAL

## 2023-09-14 DIAGNOSIS — G47.00 INSOMNIA, UNSPECIFIED TYPE: ICD-10-CM

## 2023-09-14 DIAGNOSIS — G47.19 EXCESSIVE DAYTIME SLEEPINESS: ICD-10-CM

## 2023-09-14 DIAGNOSIS — G47.33 OBSTRUCTIVE SLEEP APNEA: ICD-10-CM

## 2023-09-14 PROCEDURE — 95810 POLYSOM 6/> YRS 4/> PARAM: CPT | Performed by: INTERNAL MEDICINE

## 2023-09-14 PROCEDURE — 95810 POLYSOM 6/> YRS 4/> PARAM: CPT

## 2023-09-15 NOTE — PROGRESS NOTES
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  Caffeine use:YES- soda  12 ounces and chocolate milk  8 to 18 ounces    Alcohol:Dayshift workers after 5PM: Alcohol use:NO    Typical day for patient:YES       Study Documentation    Sleep Study Indications:     Sleep Study: Diagnostic   Snore:Severe  Supplemental O2: no    O2 flow rate (L/min) range   O2 flow rate (L/min) final   Minimum SaO2 81%  Baseline SaO2 92.0%      EKG abnormalities: no     EEG abnormalities: no    Were abnormal behaviors in sleep observed:NO    Is Total Sleep Study Recording Time < 2 hours: N/A    Is Total Sleep Study Recording Time > 2 hours but study is incomplete: N/A    Is Total Sleep Study Recording Time 6 hours or more but sleep was not obtained: NO    Patient classification: employed       Post-Sleep Study    Medication used at bedtime or during sleep study:YES over the counter sleep aid    Patient reports time it took to fall asleep:less than 20 minutes    Patient reports waking up during study:3 or more times. Patient reports returning to sleep in 10 to 30 minutes. Patient reports sleeping 4 to 6 hours without dreaming. Does the Patient feel this is a typical night of sleep:typical    Patient rated sleepiness: Somewhat sleepy or tired    PAP treatment:no.

## 2023-09-16 DIAGNOSIS — G47.01 INSOMNIA DUE TO MEDICAL CONDITION: ICD-10-CM

## 2023-09-16 DIAGNOSIS — G47.33 OBSTRUCTIVE SLEEP APNEA: Primary | ICD-10-CM

## 2023-09-16 DIAGNOSIS — G47.19 EXCESSIVE DAYTIME SLEEPINESS: ICD-10-CM

## 2023-09-18 ENCOUNTER — TELEPHONE (OUTPATIENT)
Dept: SLEEP CENTER | Facility: CLINIC | Age: 48
End: 2023-09-18

## 2023-09-18 NOTE — TELEPHONE ENCOUNTER
----- Message from Keke Bruce, [place] sent at 9/16/2023 12:21 PM EDT -----  Severe obstructive sleep apnea was confirmed during the diagnostic sleep study and is likely contributing to symptoms. Recommend trial of auto-CPAP. A prescription for equipment has been provided. Patient to be scheduled for set up of equipment, followed by compliance follow up 31-91 days after set up.

## 2023-09-18 NOTE — TELEPHONE ENCOUNTER
Called patient and advised of sleep study results. Scheduled DME set up 10/3/23 in [facility] and compliance follow up 1/12/24. Added to wait list.     Awaiting diagnostic study data to be upploaded into chart. Once available, will need to [facility] CPAP and [facility] [place].

## 2023-09-20 LAB
DME PARACHUTE DELIVERY DATE REQUESTED: NORMAL
DME PARACHUTE DELIVERY NOTE: NORMAL
DME PARACHUTE ITEM DESCRIPTION: NORMAL
DME PARACHUTE ORDER STATUS: NORMAL
DME PARACHUTE SUPPLIER NAME: NORMAL
DME PARACHUTE SUPPLIER PHONE: NORMAL

## 2023-10-03 LAB
DME PARACHUTE DELIVERY DATE ACTUAL: NORMAL
DME PARACHUTE DELIVERY DATE REQUESTED: NORMAL
DME PARACHUTE DELIVERY NOTE: NORMAL
DME PARACHUTE ITEM DESCRIPTION: NORMAL
DME PARACHUTE ORDER STATUS: NORMAL
DME PARACHUTE SUPPLIER NAME: NORMAL
DME PARACHUTE SUPPLIER PHONE: NORMAL

## 2023-10-04 ENCOUNTER — TELEPHONE (OUTPATIENT)
Dept: SLEEP CENTER | Facility: CLINIC | Age: 48
End: 2023-10-04

## 2023-10-04 NOTE — TELEPHONE ENCOUNTER
Sandi Vuong was set up on 10/03/23 by Robert Jenkins on a ResMed S11 PAP 5-15cm flex 2 and mask F20 Air Fit large.

## 2023-10-05 ENCOUNTER — OFFICE VISIT (OUTPATIENT)
Dept: PODIATRY | Facility: CLINIC | Age: 48
End: 2023-10-05
Payer: COMMERCIAL

## 2023-10-05 VITALS
HEIGHT: 72 IN | BODY MASS INDEX: 34.4 KG/M2 | OXYGEN SATURATION: 98 % | DIASTOLIC BLOOD PRESSURE: 87 MMHG | HEART RATE: 75 BPM | WEIGHT: 254 LBS | SYSTOLIC BLOOD PRESSURE: 130 MMHG

## 2023-10-05 DIAGNOSIS — M72.2 PLANTAR FASCIITIS OF LEFT FOOT: Primary | ICD-10-CM

## 2023-10-05 PROCEDURE — 99213 OFFICE O/P EST LOW 20 MIN: CPT | Performed by: PODIATRIST

## 2023-10-05 RX ORDER — METHYLPREDNISOLONE 4 MG/1
TABLET ORAL
Qty: 21 TABLET | Refills: 1 | Status: SHIPPED | OUTPATIENT
Start: 2023-10-05

## 2023-10-05 RX ORDER — METHYLPREDNISOLONE 4 MG/1
TABLET ORAL
Qty: 21 TABLET | Refills: 0 | Status: SHIPPED | OUTPATIENT
Start: 2023-10-05 | End: 2023-10-05 | Stop reason: SDUPTHER

## 2023-10-05 NOTE — PROGRESS NOTES
Assessment/Plan:     The patient's clinical examination is significant for reduced tenderness to palpation to the plantar medial curl of the left os calcis today. There is no erythema nor edema nor calor or ecchymosis noted to the left heel. There are no open lesions. There is no tenderness with lateral squeeze of the left calcaneus. The patient is doing fairly well from a clinical standpoint. Tenderness to his left heel is improved by greater than 50%. He rates his pain at about a 2 out of 10 on the pain scale today. Treatment options were discussed with the patient. I reinforced the importance of proper stretching of his plantar fascia, especially to help prevent recurrence of this issue. Continue supportive shoe gear with his custom molded arch supports. I will also start him on a Medrol Dosepak for its anti-inflammatory effect, 1 refill was also provided. Recommend follow-up in 4 to 6 weeks or as needed if his left heel pain resolves in a satisfactory manner. Diagnoses and all orders for this visit:    Plantar fasciitis of left foot  -     Discontinue: methylPREDNISolone 4 MG tablet therapy pack; Use as directed on package  -     methylPREDNISolone 4 MG tablet therapy pack; Use as directed on package          Subjective:     Patient ID: Tiago Sutherland is a 46 y.o. male. The patient presents today for follow-up of left heel pain secondary to planter fasciitis. The patient has responded well to oral diclofenac and has tolerated well without any GI issues. He does note that the pain has improved but slightly more than 50%. He rated his pain at about a 2 out of 10 on the pain scale today. He states that there is decreased tenderness when getting up after periods of rest at this point in time.       PAST MEDICAL HISTORY:  Past Medical History:   Diagnosis Date   • Anesthesia     "woke up during procedure"   • Obstructive sleep apnea 6/13/2023       PAST SURGICAL HISTORY:  Past Surgical History:   Procedure Laterality Date   • EYE SURGERY      lasik   • FOOT SURGERY Bilateral     hammer toe   • FOREARM SURGERY Right    • HAND SURGERY Left    • [place] NEUROPLASTY &/TRANSPOS MEDIAN NRV CARPAL ZINA Left 8/20/2019    Procedure: CARPAL TUNNEL RELEASE;  Surgeon: Joan Sousa MD;  Location: [facility];  Service: Orthopedics   • [place] NEUROPLASTY &/TRANSPOS MEDIAN NRV CARPAL Duy Soria Right 12/4/2020    Procedure: RELEASE CARPAL TUNNEL;  Surgeon: Joan Sousa MD;  Location:  MAIN OR;  Service: Orthopedics   • [place] SURGICAL ARTHROSCOPY SHOULDER W/ROTATOR CUFF RPR Left 2/27/2023    Procedure: ARTHROSCOPY SHOULDER- left, rotator cuff repair, biceps tenodesis and all necessary procedures;  Surgeon: Jordyn Rosa;  Location:  MAIN OR;  Service: Orthopedics   • SHOULDER SURGERY Right     x2        ALLERGIES:  Adhesive [medical tape] and Penicillins    MEDICATIONS:  Current Outpatient Medications   Medication Sig Dispense Refill   • methylPREDNISolone 4 MG tablet therapy pack Use as directed on package 21 tablet 1   • diclofenac (VOLTAREN) 75 mg EC tablet Take 1 tablet (75 mg total) by mouth 2 (two) times a day (Patient not taking: Reported on 10/5/2023) 30 tablet 1   • nicotine (NICODERM CQ) 21 mg/24 hr TD 24 hr patch Place 1 patch on the skin over 24 hours every 24 hours (Patient not taking: Reported on 4/20/2023) 28 patch 0     No current facility-administered medications for this visit. SOCIAL HISTORY:  Social History     Socioeconomic History   • Marital status: Single     Spouse name: None   • Number of children: None   • Years of education: None   • Highest education level: None   Occupational History   • None   Tobacco Use   • Smoking status: Every Day     Packs/day: 0.50     Types: Cigarettes   • Smokeless tobacco: Never   • Tobacco comments:     last cigarette at 0644   Vaping Use   • Vaping Use: Never used   Substance and Sexual Activity   • Alcohol use:  Yes     Alcohol/week: 4.0 standard drinks of alcohol     Types: 4 Cans of beer per week     Comment: occasionally   • Drug use: Never   • Sexual activity: None   Other Topics Concern   • None   Social History Narrative   • None     Social Determinants of Health     Financial Resource Strain: Not on file   Food Insecurity: Not on file   Transportation Needs: Not on file   Physical Activity: Not on file   Stress: Not on file   Social Connections: Not on file   Intimate Partner Violence: Not on file   Housing Stability: Not on file        Review of Systems   Constitutional: Negative. HENT: Negative. Eyes: Negative. Respiratory: Negative. Cardiovascular: Negative. Endocrine: Negative. Musculoskeletal: Negative. Neurological: Negative. Hematological: Negative. Psychiatric/Behavioral: Negative. Objective:     Physical Exam  Vitals reviewed. Constitutional:       Appearance: Normal appearance. HENT:      Head: Normocephalic and atraumatic. Nose: Nose normal.   Eyes:      Conjunctiva/sclera: Conjunctivae normal.      Pupils: Pupils are equal, round, and reactive to light. Cardiovascular:      Pulses:           Dorsalis pedis pulses are 2+ on the left side. Posterior tibial pulses are 2+ on the left side. Pulmonary:      Effort: Pulmonary effort is normal.   Musculoskeletal:        Feet:    Feet:      Left foot:      Skin integrity: Skin integrity normal.      Comments: The patient's clinical examination is significant for reduced tenderness to palpation to the plantar medial curl of the left os calcis today. There is no erythema nor edema nor calor or ecchymosis noted to the left heel. There are no open lesions. There is no tenderness with lateral squeeze of the left calcaneus. Skin:     General: Skin is warm. Capillary Refill: Capillary refill takes less than 2 seconds. Neurological:      General: No focal deficit present.       Mental Status: He is alert and oriented to person, place, and time.   Psychiatric:         Mood and Affect: Mood normal.         Behavior: Behavior normal.         Thought Content:  Thought content normal.

## 2023-10-29 ENCOUNTER — OFFICE VISIT (OUTPATIENT)
Dept: URGENT CARE | Facility: CLINIC | Age: 48
End: 2023-10-29
Payer: COMMERCIAL

## 2023-10-29 ENCOUNTER — APPOINTMENT (OUTPATIENT)
Dept: RADIOLOGY | Facility: CLINIC | Age: 48
End: 2023-10-29
Payer: COMMERCIAL

## 2023-10-29 VITALS
HEART RATE: 80 BPM | DIASTOLIC BLOOD PRESSURE: 70 MMHG | RESPIRATION RATE: 18 BRPM | SYSTOLIC BLOOD PRESSURE: 124 MMHG | OXYGEN SATURATION: 98 % | TEMPERATURE: 98.2 F

## 2023-10-29 DIAGNOSIS — M54.16 LUMBAR RADICULOPATHY: Primary | ICD-10-CM

## 2023-10-29 DIAGNOSIS — M79.605 LEFT LEG PAIN: ICD-10-CM

## 2023-10-29 PROCEDURE — 99213 OFFICE O/P EST LOW 20 MIN: CPT | Performed by: PHYSICIAN ASSISTANT

## 2023-10-29 PROCEDURE — 72100 X-RAY EXAM L-S SPINE 2/3 VWS: CPT

## 2023-10-29 RX ORDER — PREDNISONE 10 MG/1
TABLET ORAL
Qty: 21 TABLET | Refills: 0 | Status: SHIPPED | OUTPATIENT
Start: 2023-10-29 | End: 2023-11-07

## 2023-10-29 RX ORDER — PREDNISONE 20 MG/1
60 TABLET ORAL ONCE
Status: COMPLETED | OUTPATIENT
Start: 2023-10-29 | End: 2023-10-29

## 2023-10-29 RX ADMIN — PREDNISONE 60 MG: 20 TABLET ORAL at 18:01

## 2023-10-29 NOTE — PATIENT INSTRUCTIONS
Take steroids as directed. While taking steroids DO NOT take Ibuprofen/Motrin/Aleve/Advil. May take Tylenol per bottle directions. Once steroids are completed start Motrin 3 pills every 6 hours  Follow up with PCP in 3-5 days. Proceed to  ER if symptoms worsen.

## 2023-10-29 NOTE — PROGRESS NOTES
[facility]        NAME: Erin Lawson is a 46 y.o. male  : 1975    MRN: [id number]  DATE: 2023  TIME: 5:22 PM    Assessment and Plan   Lumbar radiculopathy [M54.16]  1. Lumbar radiculopathy  Ambulatory Referral to [facility]    predniSONE 10 mg tablet      2. Left leg pain  XR spine lumbar 2 or 3 views injury            Patient Instructions   Left lumbar radiculopathy. Xray shows questionable L1 compression fracture but he is non-tender in this area. 60 mg prednisone given today at [facility] as pharmacy is closed, will start prescription tomorrow. Follow up with [facility].   Take steroids as directed. While taking steroids DO NOT take Ibuprofen/Motrin/Aleve/Advil. May take Tylenol per bottle directions. Once steroids are completed start Motrin 3 pills every 6 hours  Follow up with PCP in 3-5 days. Proceed to  ER if symptoms worsen. Chief Complaint     Chief Complaint   Patient presents with    Left Leg Pain     Left Hip Pain    Left Knee Pain     Left Thigh Pain         History of Present Illness       HPI  47 y/o male presents for evaluation low back and left leg pain. He states he was painting 6 days ago, denies any injury at the time but the following day noted onset of low back pain with radiation down the leg and tingling in the left lower leg. He states he has chronic foot pain therefore difficult to distinguish any new pain in the foot. He denies h/o lumbar issues. He was taking 1 Motrin every 4 hours without relief. He took a 'pain pill' this morning left over from recent RTC sx without significant relief. He states he is most comfortable standing and least comfortable sitting. He denies saddle anesthesia or loss of bowel/bladder continence. Review of Systems   Review of Systems   Constitutional:  Negative for chills and fever. HENT:  Negative for ear pain and sore throat.     Eyes:  Negative for pain and visual disturbance. Respiratory:  Negative for cough and shortness of breath. Cardiovascular:  Negative for chest pain and palpitations. Gastrointestinal:  Negative for abdominal pain and vomiting. Genitourinary:  Negative for dysuria and hematuria. Musculoskeletal:  Positive for back pain and gait problem. Negative for arthralgias. Skin:  Negative for color change and rash. Neurological:  Negative for seizures and syncope. All other systems reviewed and are negative. Current Medications       Current Outpatient Medications:     diclofenac (VOLTAREN) 75 mg EC tablet, Take 1 tablet (75 mg total) by mouth 2 (two) times a day (Patient not taking: Reported on 10/5/2023), Disp: 30 tablet, Rfl: 1    nicotine (NICODERM CQ) 21 mg/24 hr TD 24 hr patch, Place 1 patch on the skin over 24 hours every 24 hours (Patient not taking: Reported on 4/20/2023), Disp: 28 patch, Rfl: 0    predniSONE 10 mg tablet, Take 4 tablets (40 mg total) by mouth daily for 3 days, THEN 2 tablets (20 mg total) daily for 3 days, THEN 1 tablet (10 mg total) daily for 3 days. , Disp: 21 tablet, Rfl: 0    Current Allergies     Allergies as of 10/29/2023 - Reviewed 10/29/2023   Allergen Reaction Noted    Adhesive [medical tape] Hives 06/04/2018    Penicillins Rash 03/06/2023            The following portions of the patient's history were reviewed and updated as appropriate: allergies, current medications, past family history, past medical history, past social history, past surgical history and problem list.     Past Medical History:   Diagnosis Date    Anesthesia     "woke up during procedure"    Obstructive sleep apnea 6/13/2023       Past Surgical History:   Procedure Laterality Date    EYE SURGERY      lasik    FOOT SURGERY Bilateral     hammer toe    FOREARM SURGERY Right     HAND SURGERY Left     [place] NEUROPLASTY &/TRANSPOS MEDIAN NRV CARPAL TUNNE Left 8/20/2019    Procedure: CARPAL TUNNEL RELEASE;  Surgeon: Nicky Grover MD; [facility];  Service: Orthopedics    [place] NEUROPLASTY &/TRANSPOS MEDIAN NRV CARPAL TUNNE Right 12/4/2020    Procedure: RELEASE CARPAL TUNNEL;  Surgeon: Coleman Luong MD;  Location: [facility];  Service: Orthopedics    [place] SURGICAL ARTHROSCOPY SHOULDER W/ROTATOR CUFF RPR Left 2/27/2023    Procedure: ARTHROSCOPY SHOULDER- left, rotator cuff repair, biceps tenodesis and all necessary procedures;  Surgeon: Harriet Dias;  Location:  MAIN OR;  Service: Orthopedics    SHOULDER SURGERY Right     x2       Family History   Problem Relation Age of Onset    Diabetes Father     Diabetes Paternal Grandmother          Medications have been verified. Objective   /70   Pulse 80   Temp 98.2 °F (36.8 °C) (Temporal)   Resp 18   SpO2 98%   No LMP for male patient. Physical Exam     Physical Exam  Vitals and nursing note reviewed. Constitutional:       Appearance: Normal appearance. He is not ill-appearing. HENT:      Head: Normocephalic and atraumatic. Nose: Nose normal.   Cardiovascular:      Rate and Rhythm: Normal rate and regular rhythm. Pulses: Normal pulses. Heart sounds: Normal heart sounds. Pulmonary:      Effort: Pulmonary effort is normal.      Breath sounds: Normal breath sounds. Musculoskeletal:      Comments: Lumbar spine:  Non-tender over spinous processes or paraspinal muscles  FF: just below knees with discomfort  Ext: mild restriction with discomfort  Side bending R/L: no restriction or pain    DTR: TR at bilat patella and achilles  Strength: 5/5 all LE muscle groups  SLR:  difficult to assess after having him sit for the short time it took to perform DTR and strength testing he was c/o severe left leg/knee pain. Pain seemed to worsen with right and left SLR - leaned back but unable to assess accurately. Skin:     General: Skin is warm and dry. Neurological:      Mental Status: He is alert and oriented to person, place, and time.    Psychiatric: Mood and Affect: Mood normal.         Behavior: Behavior normal.       Xray: 3 view lumbar spine:  Preliminary reading:  mild scoliotic curvature noted on AP view, on lateral view normal lordosis is maintained, possible L1 compression fracture, remaining vertebral body heights and disc spaces are well maintained. Await final reading.

## 2023-10-30 ENCOUNTER — NURSE TRIAGE (OUTPATIENT)
Dept: PHYSICAL THERAPY | Facility: OTHER | Age: 48
End: 2023-10-30

## 2023-10-30 DIAGNOSIS — M54.42 ACUTE LEFT-SIDED LOW BACK PAIN WITH LEFT-SIDED SCIATICA: Primary | ICD-10-CM

## 2023-10-30 NOTE — TELEPHONE ENCOUNTER
[facility] RHEUMATOLOGY            PROGRESS NOTE      Subjective:       Patient ID:   NAME: Florinda Puckett : 1954     67 y.o. female    Referring Doc: No ref. provider found  Other Physicians:    Chief Complaint:  Positive LAUREL      History of Present Illness:     Patient returns today for a regularly scheduled follow-up visit for pos LAUREL ( most recent 1 is negative)    The patient  is doing well overall.  No fevers or chest pains.  No cough shortness of breath.  No skin rashes.  No sicca symptoms or Raynaud's.  No GI complaints.  No severe arthralgias or joint swelling.      ROS:   GEN:  No  fever, night sweats . weight is stable   No fatigue  SKIN: no rashes, no bruising, no ulcerations, no Raynaud's  HEENT: no HA's, No visual changes, no mucosal ulcers, no sicca symptoms,  CV:   no CP, SOB, PND, GERMAN, no orthopnea, no palpitations  PULM: normal with no SOB, cough, hemoptysis, sputum or pleuritic pain  GI:  no abdominal pain, nausea, vomiting, constipation, diarrhea, melanotic stools, BRBPR, hematemesis, no dysphagia  :   no dysuria  NEURO: no paresthesias, headaches, visual disturbances, muscle weakness  MUSCULOSKELETAL:no joint swelling, prolonged AM stiffness, no back pain, no muscle pain  Allergies:  Review of patient's allergies indicates:   Allergen Reactions    Iodine and iodide containing products      Only in soft shell crabs       Medications:    Current Outpatient Medications:     aspirin 81 MG Chew, Take 1 tablet (81 mg total) by mouth once daily., Disp: 90 tablet, Rfl: 3    BIOTIN ORAL, Take by mouth once daily., Disp: , Rfl:     CALCIUM CARBONATE/VITAMIN D3 (CALCIUM+D ORAL), Take by mouth once daily., Disp: , Rfl:     COLLAGEN MISC, by Misc.(Non-Drug; Combo Route) route., Disp: , Rfl:     ibuprofen (ADVIL,MOTRIN) 200 MG tablet, Take 200 mg by mouth every 6 (six) hours as needed for Pain., Disp: , Rfl:     TURMERIC ROOT EXTRACT ORAL, Take by mouth., Disp: , Rfl:     PMHx/PSHx  Additional Information   Negative: Has the patient had unexplained weight loss? Negative: Does the patient have a fever? Negative: Is the patient experiencing blood in sputum? Negative: Is the patient experiencing urine retention? Negative: Is the patient experiencing acute drop foot or paralysis? Negative: Has the patient experienced major trauma? (fall from height, high speed collision, direct blow to spine) and is also experiencing nausea, light-headedness, or loss of consciousness? Affirmative: Is this a chronic condition? Protocols used: [facility] Protocol    X-ray Lumbar shows normal exam.    This RN did review in detail the [facility] and what we can provide for their back pain. Patient is agreeable to being triaged by this RN and would like to proceed with Physical Therapy. Referral was placed for Physical Therapy at the [facility] site. Patients information was sent to the  to make evaluation appointment. Patient made aware that the PT office  will be calling to schedule the appointment. Patient was provided with the phone number to the PT office. No further questions and/or concerns were voiced by the patient at this time. Patient states understanding of the referral that was placed. Referral Closed. Updates:        Objective:     Vitals:  Blood pressure (!) 141/95, weight 51.6 kg (113 lb 11.2 oz).    Physical Examination:   GEN: no apparent distress, comfortable; AAOx3  SKIN: no rashes,no ulceration, no Raynaud's, no petechiae, no SQ nodules,  HEAD: normal  EYES: no pallor, no icterus  NECK: no masses, thyroid normal, trachea midline, no LAD/LN's, supple  CV: RRR with no murmur; l S1 and S2 reg. ,no gallop no rubs,   CHEST: Normal respiratory effort; CTAB; normal breath sounds; no wheeze or crackles  MUSC/Skeletal: ROM normal; no crepitus; joints without synovitis,  no deformities  No joint swelling or tenderness of PIP, MCP, wrist, elbow, shoulder, or knee joints  EXTREM: no clubbing, cyanosis, no edema,  NEURO: grossly intact; motor WNL; AAOx3;  PSYCH: normal mood, affect and behavior  LYMPH: normal cervical, supraclavicular          Labs:   Lab Results   Component Value Date    WBC 3.76 (L) 06/17/2022    HGB 14.9 06/17/2022    HCT 45.7 06/17/2022    MCV 93 06/17/2022     06/17/2022    CMP  @[facility](NA,K,CL,CO2,GLU,BUN,Creatinine,Calcium,PROT,Albumin,Bilitot,Alkphos,AST,ALT,CRP,ESR,RF,CCP,LAUREL,SSA,CPK,uric acid) )@  I have reviewed all available lab results and radiology reports.    Radiology/Diagnostic Studies:        Assessment/Plan:   (1) 67 y.o. female with diagnosis of osteoarthritis.  This is stable  Osteoporosis.  But she is still reluctant to start treatment for osteoporosis .Encouraged her to follow-up with  and follow his recommendation for osteoporotic treatment.              Discussion:     I have explained all of the above in detail and the patient understands all of the current recommendation(s). I have answered all questions to the best of my ability and to their complete satisfaction.       The patient is to continue with the current management plan         RTC in         Electronically signed by Ankush Gibbs MD    Patient notified that this office will be closing  December 22, 2022. They should begin looking for another rheumatologist as soon as possible.  A list with the names and contact details of rheumatologists in the surrounding area was provided.     Answers submitted by the patient for this visit:  Rheumatology Questionnaire (Submitted on 10/18/2022)  fever: No  eye redness: No  mouth sores: No  headaches: No  shortness of breath: No  chest pain: No  trouble swallowing: No  diarrhea: No  constipation: No  unexpected weight change: No  genital sore: No  dysuria: No  During the last 3 days, have you had a skin rash?: No  Bruises or bleeds easily: No  cough: No

## 2023-10-30 NOTE — TELEPHONE ENCOUNTER
Additional Information   Negative: Is this related to a work injury? Negative: Is this related to an MVA? Negative: Are you currently recieving homecare services? Background - Initial Assessment  Clinical complaint: Pain is left low back from spine around the left side where stomach would start. Radiates down left leg to foot. Pain started 10/23/23. NKI Patient was painting on 10/22/23 and pain started the next day. Has tingling in left lower leg/shin area. Has chronic foot pain. Was ok standing and worse sitting. As of this morning is in pain regardless of position. Xray shows possible CF L1. Left thigh feels like a constant laura horse. Left knee is burning and shin is tingling. HX of pinched nerve in neck last 3 years. No prior back surgery.  Was seen in  10/29/23  Date of onset: 10/23/23  Frequency of pain: constant  Quality of pain: Thigh = throbbing, Knee=burning, and low back = aching    Protocols used: [facility] Protocol

## 2023-11-09 ENCOUNTER — EVALUATION (OUTPATIENT)
Dept: PHYSICAL THERAPY | Facility: CLINIC | Age: 48
End: 2023-11-09
Payer: COMMERCIAL

## 2023-11-09 VITALS — HEART RATE: 70 BPM | SYSTOLIC BLOOD PRESSURE: 124 MMHG | TEMPERATURE: 97.5 F | DIASTOLIC BLOOD PRESSURE: 74 MMHG

## 2023-11-09 DIAGNOSIS — M54.42 ACUTE LEFT-SIDED LOW BACK PAIN WITH LEFT-SIDED SCIATICA: Primary | ICD-10-CM

## 2023-11-09 PROCEDURE — 97161 PT EVAL LOW COMPLEX 20 MIN: CPT | Performed by: PHYSICAL THERAPIST

## 2023-11-09 PROCEDURE — 97110 THERAPEUTIC EXERCISES: CPT | Performed by: PHYSICAL THERAPIST

## 2023-11-09 NOTE — PROGRESS NOTES
PT Evaluation     Today's date: 2023  Patient name: Kiki Lombardi  : 1975  MRN: [id number]  Referring provider: Benitez Alcocer PT  Dx:   Encounter Diagnosis     ICD-10-CM    1. Acute left-sided low back pain with left-sided sciatica  M54.42 Ambulatory referral to PT spine                     Assessment  Assessment details: Kiki Lombardi is a 46 y.o. male who presents with complaints of Acute left-sided low back pain with left-sided sciatica  (primary encounter diagnosis). Patient is presenting with provisional directional preference of extension at this time. Currently limited with standing, walking, sleeping, sitting, driving, and performing transitions. No relief reported with steroid dose pack. Prognosis is fair given HEP compliance and PT 1-2x/wk. Positive prognostic indicators include positive attitude toward recovery. Please contact me if you have any questions or recommendations. Thank you for the opportunity to share in [facility]. Impairments: abnormal muscle firing, abnormal or restricted ROM, abnormal movement, impaired physical strength, lacks appropriate home exercise program, pain with function, poor posture  and poor body mechanics    Symptom irritability: highUnderstanding of Dx/Px/POC: good   Prognosis: good    Plan  Patient would benefit from: skilled physical therapy  Planned therapy interventions: joint mobilization, manual therapy, patient education, postural training, strengthening, stretching, therapeutic activities, therapeutic exercise, home exercise program, neuromuscular re-education, flexibility, functional ROM exercises and abdominal trunk stabilization  Frequency: 1-2x. Duration in weeks: 8  Treatment plan discussed with: patient        Subjective Evaluation    History of Present Illness  Mechanism of injury: Patient reports that he was painting and the next day and had significant difficulty with getting out of bed.  Low back pain, left lateral thigh pain, pain in kneecap with ambulation due to how he is walking. Shin tingling for 2 weeks now with occasional tingling in foot  (2-3 times). No change in symptoms with prednisone. Sleep disturbances reported at this time as cannot sleep on back but worse on either side. and feels limited with sitting. Feels better when leg is completely straight. Can barely make it home from work, 50 minute driving. Needs to drive to [place] next week. Step to stair navigation with BUE railing. Left sided back discomfort. Patient denies bowel/bladder dysfunction, saddle paraesthesias, but cough/sneeze provocation into leg. Not a recurrent problem   Quality of life: fair    Patient Goals  Patient goals for therapy: increased motion, increased strength, independence with ADLs/IADLs and decreased pain    Pain  Current pain ratin  At worst pain rating: 10  Aggravating factors: sitting, stair climbing, standing and walking  Progression: no change    Treatments  Previous treatment: medication        Objective  GAIT: antalgic gait pattern  Squat assess: 50% depth with pain  Heel toe walk: -    Lumbar  % of normal   Flex. 75p! Extn. 50   SB Left 100   SB Right 100   ROT Left 100   ROT Right 100   Repetitive testing: extension in standing= better  extension in lying= better    flexion standing= worse  SGRIS= worse  SGLIS= no effect          MMT    Hip       L       R   Flex. 4 5   Extn. 4 4                 MMT    Knee         L        R   Flex. 4 5   Extn. 4 5              MMT    Ankle       L        R   PF 5 5   DF.  5 5   EHL 5 5     Posture: slouched sitting posture, correction increases leg pain  Palpation: L5 hypomobility  Dermatome: light touch  L4:  decreased LLE  Reflexes:  (L/R) L4:  0      S1:   0     Slump test: L=  +   R=   -    Straight leg raise:   L=   +   R=   -            Hip/SI joint:     ute = +   long axis distraction (hip) =     Active SLR=  +  Active SLR with stabilization =  -    Cibclaryka scan=  - Joint mobility = decreased L5-S1 mobility       Precautions: hx of left cuff repair 2023  Patient provided verbal consent to treatment plan and recommended interventions. Insurance:  AMA/CMS Eval/ Re-eval POC expires Gera Rita #/ Referral # Total    Start date  Expiration date Extension  Visit limitation? PT only or  PT+OT? Co-Insurance   cms 11.9 [id number]  Not needed                          Patient provided verbal consent to treatment plan, grade 5 joint mobilization, and recommended interventions.       Manuals 11.9            L5-S1 gr. 5 FB                                                   Neuro Re-Ed                                                                                                        Ther Ex             Press up 3*10            ANGEL with OP 2*10            Prone on elbows 2'                                                                             Ther Activity             Pt edu FB                         Gait Training                                       Modalities

## 2023-11-12 ENCOUNTER — OFFICE VISIT (OUTPATIENT)
Dept: URGENT CARE | Facility: CLINIC | Age: 48
End: 2023-11-12
Payer: COMMERCIAL

## 2023-11-12 VITALS
HEART RATE: 95 BPM | OXYGEN SATURATION: 97 % | RESPIRATION RATE: 18 BRPM | TEMPERATURE: 97 F | SYSTOLIC BLOOD PRESSURE: 128 MMHG | DIASTOLIC BLOOD PRESSURE: 70 MMHG

## 2023-11-12 DIAGNOSIS — M54.16 LUMBAR RADICULOPATHY: Primary | ICD-10-CM

## 2023-11-12 PROCEDURE — 99213 OFFICE O/P EST LOW 20 MIN: CPT | Performed by: PHYSICIAN ASSISTANT

## 2023-11-12 RX ORDER — IBUPROFEN 800 MG/1
800 TABLET ORAL EVERY 8 HOURS PRN
Qty: 30 TABLET | Refills: 0 | Status: SHIPPED | OUTPATIENT
Start: 2023-11-12

## 2023-11-12 RX ORDER — LIDOCAINE 50 MG/G
1 PATCH TOPICAL DAILY
Qty: 15 PATCH | Refills: 0 | Status: SHIPPED | OUTPATIENT
Start: 2023-11-12

## 2023-11-12 NOTE — PATIENT INSTRUCTIONS
Use Motrin 1 pill 3 x day, take with food  Try Lidoderm patches on back when driving   Follow up with PCP in 3-5 days. Proceed to  ER if symptoms worsen.

## 2023-11-12 NOTE — PROGRESS NOTES
[facility]        NAME: Nazanin Navarro is a 46 y.o. male  : 1975    MRN: [id number]  DATE: 2023  TIME: 1:16 PM    Assessment and Plan   Lumbar radiculopathy [M54.16]  1. 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 Use Motrin 1 pill 3 x day, take with food  Try Lidoderm patches on back when driving   Follow up with PCP in 3-5 days. Proceed to  ER if symptoms worsen. Chief Complaint     Chief Complaint   Patient presents with    Back Pain    Leg Pain     Lower back pain is radiating to left leg, which has caused left leg pain to feel worse than back pain. History of Present Illness       HPI  47 y/o male was seen by me at [facility] on oct 29th for lumbar radiculopathy. He was prescribed medrol dose pack which provided minimal relief. He was referred to [facility] - phone triage was performed and he was referred to PT. He attended one PT session, c/o increased lumbar pain with persistent pain in the left thigh - feels very tight and n/t in left shin. Symptoms are worse when sitting. He tried OTC Lidocaine patches with minimal relief. He is leaving tomorrow to drive to [place] for work -  and is requesting a muscle relaxer which was recommended by his therapist.  He states he has also tried chiro. He denies bowel/bladder incontinence or saddle anesthesia. Review of Systems   Review of Systems   Constitutional:  Negative for chills and fever. HENT:  Negative for ear pain and sore throat. Eyes:  Negative for pain and visual disturbance. Respiratory:  Negative for cough and shortness of breath. Cardiovascular:  Negative for chest pain and palpitations. Gastrointestinal:  Negative for abdominal pain and vomiting. Genitourinary:  Negative for dysuria and hematuria. Musculoskeletal:  Positive for back pain. Negative for arthralgias. Skin:  Negative for color change and rash. Neurological:  Positive for numbness. Negative for seizures and syncope. All other systems reviewed and are negative.         Current Medications       Current Outpatient Medications:     ibuprofen (MOTRIN) 800 mg tablet, Take 1 tablet (800 mg total) by mouth every 8 (eight) hours as needed for mild pain, Disp: 30 tablet, Rfl: 0    lidocaine (Lidoderm) 5 %, Apply 1 patch topically over 12 hours daily Remove & Discard patch within 12 hours or as directed by MD, Disp: 15 patch, Rfl: 0    diclofenac (VOLTAREN) 75 mg EC tablet, Take 1 tablet (75 mg total) by mouth 2 (two) times a day (Patient not taking: Reported on 10/5/2023), Disp: 30 tablet, Rfl: 1    nicotine (NICODERM CQ) 21 mg/24 hr TD 24 hr patch, Place 1 patch on the skin over 24 hours every 24 hours (Patient not taking: Reported on 4/20/2023), Disp: 28 patch, Rfl: 0    Current Allergies     Allergies as of 11/12/2023 - Reviewed 11/12/2023   Allergen Reaction Noted    Adhesive [medical tape] Hives 06/04/2018    Penicillins Rash 03/06/2023            The following portions of the patient's history were reviewed and updated as appropriate: allergies, current medications, past family history, past medical history, past social history, past surgical history and problem list.     Past Medical History:   Diagnosis Date    Anesthesia     "woke up during procedure"    Obstructive sleep apnea 6/13/2023       Past Surgical History:   Procedure Laterality Date    EYE SURGERY      lasik    FOOT SURGERY Bilateral     hammer toe    FOREARM SURGERY Right     HAND SURGERY Left     [place] NEUROPLASTY &/TRANSPOS MEDIAN NRV CARPAL TUNNE Left 8/20/2019    Procedure: CARPAL TUNNEL RELEASE;  Surgeon: Lynford Holstein, MD;  Location: [facility]; Service: Orthopedics    [place] NEUROPLASTY &/TRANSPOS MEDIAN NRV CARPAL TUNNE Right 12/4/2020    Procedure: RELEASE CARPAL TUNNEL;  Surgeon: Kristopher Wilder MD;  Location:  MAIN OR;  Service: Orthopedics    [place] SURGICAL ARTHROSCOPY SHOULDER W/ROTATOR CUFF RPR Left 2/27/2023    Procedure: ARTHROSCOPY SHOULDER- left, rotator cuff repair, biceps tenodesis and all necessary procedures;  Surgeon: Lisset Pardo;  Location:  MAIN OR;  Service: Orthopedics    SHOULDER SURGERY Right     x2       Family History   Problem Relation Age of Onset    Diabetes Father     Diabetes Paternal Grandmother          Medications have been verified. Objective   /70   Pulse 95   Temp (!) 97 °F (36.1 °C)   Resp 18   SpO2 97%   No LMP for male patient. Physical Exam     Physical Exam  Vitals and nursing note reviewed. Constitutional:       Appearance: Normal appearance. He is not ill-appearing. HENT:      Head: Normocephalic and atraumatic. Nose: Nose normal.   Cardiovascular:      Rate and Rhythm: Normal rate and regular rhythm. Pulses: Normal pulses. Heart sounds: Normal heart sounds. Pulmonary:      Effort: Pulmonary effort is normal.      Breath sounds: Normal breath sounds. Musculoskeletal:      Comments: LS: non-tender to palpation. Full lumbar ROM without discomfort. 5/5 strength all LE muscle groups  DTR: Tr at bilat patella and achilles  + SLR on the right and left for reproduction of left leg symptoms     Skin:     General: Skin is warm and dry. Neurological:      Mental Status: He is alert and oriented to person, place, and time.    Psychiatric:         Mood and Affect: Mood normal.         Behavior: Behavior normal.

## 2023-11-20 ENCOUNTER — OFFICE VISIT (OUTPATIENT)
Dept: PHYSICAL THERAPY | Facility: CLINIC | Age: 48
End: 2023-11-20
Payer: COMMERCIAL

## 2023-11-20 ENCOUNTER — AMB VIDEO VISIT (OUTPATIENT)
Dept: OTHER | Facility: HOSPITAL | Age: 48
End: 2023-11-20

## 2023-11-20 DIAGNOSIS — M54.42 ACUTE LEFT-SIDED LOW BACK PAIN WITH LEFT-SIDED SCIATICA: Primary | ICD-10-CM

## 2023-11-20 DIAGNOSIS — M54.50 ACUTE LOW BACK PAIN, UNSPECIFIED BACK PAIN LATERALITY, UNSPECIFIED WHETHER SCIATICA PRESENT: Primary | ICD-10-CM

## 2023-11-20 PROCEDURE — ECARE PR SL URGENT CARE VIRTUAL VISIT: Performed by: NURSE PRACTITIONER

## 2023-11-20 PROCEDURE — 97110 THERAPEUTIC EXERCISES: CPT | Performed by: PHYSICAL THERAPIST

## 2023-11-20 RX ORDER — CYCLOBENZAPRINE HCL 10 MG
10 TABLET ORAL 3 TIMES DAILY PRN
Qty: 15 TABLET | Refills: 0 | Status: SHIPPED | OUTPATIENT
Start: 2023-11-20 | End: 2023-11-25

## 2023-11-20 NOTE — PROGRESS NOTES
Daily Note     Today's date: 2023  Patient name: Pamela Houston  : 1975  MRN: [id number]  Referring provider: Sven Noble, PT  Dx:   Encounter Diagnosis     ICD-10-CM    1. Acute left-sided low back pain with left-sided sciatica  M54.42                      Subjective: Patient reports that he continues to have pain with sitting, walking, and standing. Sleep is about 1 hour at a time. Reports that he was to drive from [place] to [facility] and had to stop twice. Reports leg feels like it will give out on him. Occasional numbness/tingling in left leg. Will report leg locks up when he has been sitting. + cough/sneeze provocation. Negative bowel/bladder dysfunction nor saddle paraesthesias. Standing is better than sitting as knee pain will be worse when sitting. Objective: See treatment diary below    Assessment: Tolerated treatment fair. Patient  reported standing felt better on his leg compared to sitting. + cross legged straight leg raise at this time. Negative straight leg raise LLE. Press up felt slightly better as well as repeated extension elbows on wall. During intervention, reported that he put sweatshirt behind his back while driving with increased back pain and less leg pain. Contacted comp spine program through urgent care and CRNP recommended muscle relaxer to try to help with limited sleeping. Patient to be seen again Wednesday to determine progress of press up and repeated extension in standing at wall. Symptoms did no peripheralize with RFIS. Plan:  assess response to HEP. Precautions: hx of left cuff repair   Patient provided verbal consent to treatment plan and recommended interventions. Insurance:  AMA/CMS Eval/ Re-eval POC expires Cherie Nguyen #/ Referral # Total    Start date  Expiration date Extension  Visit limitation? PT only or  PT+OT?  Co-Insurance   cms 11.9 1.4.24  Not needed                          Patient provided verbal consent to treatment plan, grade 5 joint mobilization, and recommended interventions.       Manuals 11.9 11.20           L5-S1 gr. 5 FB                                                   Neuro Re-Ed                                                                                                        Ther Ex             Press up 3*10 10x           ANGEL with OP 2*10 2*10 against wall           Prone on elbows 2' 2'           Side glide to right in standing  10x                                                               Ther Activity             Pt edu FB FB                        Gait Training                                       Modalities

## 2023-11-20 NOTE — PROGRESS NOTES
Video Visit - Ross June 46 y.o. male MRN: [id number]    REQUIRED DOCUMENTATION:         1. This service was provided via [employer]. 2. Provider located at [address] [place] [address] [address]  [phone].  3. [facility] provider: [address]. 4. Identify all parties in room with patient during [facility] visit:  Patient   5. After connecting through [employer], patient was identified by name and date of birth. Patient was then informed that this was a Telemedicine visit and that the exam was being conducted confidentially over secure lines. My office door was closed. No one else was in the room. Patient acknowledged consent and understanding of privacy and security of the Telemedicine visit. I informed the patient that I have reviewed their record in Epic and presented the opportunity for them to ask any questions regarding the visit today. The patient agreed to participate. This is a 46year old male here today for video visit. He is having low back pain and seen at urgent care. He was treated with medrol dose pack. He was on a long trip and passenger. He had increased low back pain. He states that nothing was helping at that time. He is having a hard time sleeping. He states he was painting prior to onset of the pain. He had recently moved and getting the apartment ready. He was painting and ceiling 2 days prior to onset of the pain. When he bends his legs he states his thigh feels tight. PT states he is having spasms on left side. He has noticed some tingling on his left shin. Positive straight leg test on right side. ROM is good. He is currently being seen by PT for the comprehensive spine program.  PT thinks a muscle will help as he is not getting much sleep at night due to the pain. He is only sleeping about 1 hour at a time. Patient states he does not drive [place] at this time. He is not driving for work.   He understands he can not drive while taking muscle relaxer. H      Review of Systems   Constitutional:  Negative for activity change, chills, fatigue and fever. HENT: Negative. Respiratory: Negative. Cardiovascular: Negative. Musculoskeletal:  Positive for back pain. Skin: Negative. Neurological:  Negative for weakness and numbness. Psychiatric/Behavioral: Negative. There were no vitals filed for this visit. Physical Exam  Constitutional:       General: He is not in acute distress. Appearance: Normal appearance. He is not ill-appearing or toxic-appearing. HENT:      Head: Normocephalic and atraumatic. Eyes:      Conjunctiva/sclera: Conjunctivae normal.   Pulmonary:      Effort: Pulmonary effort is normal. No respiratory distress. Musculoskeletal:      Comments: See back exam from pt today. Skin:     Comments: No rash on head or neck. Neurological:      Mental Status: He is alert and oriented to person, place, and time. Psychiatric:         Mood and Affect: Mood normal.         Thought Content: Thought content normal.         Judgment: Judgment normal.       Diagnoses and all orders for this visit:    Acute low back pain, unspecified back pain laterality, unspecified whether sciatica present  -     cyclobenzaprine (FLEXERIL) 10 mg tablet; Take 1 tablet (10 mg total) by mouth 3 (three) times a day as needed for muscle spasms for up to 5 days      Patient Instructions   Rest.  Alternate ice and warm compresses as discussed. Start muscle relaxer for muscle spasms, avoid driving while taking medication. Continue Ibuprofen and you can alternate with tylenol. Continue follow up with PT>  Go to ER with worsening symptoms. Acute Low Back Pain, Ambulatory Care   GENERAL INFORMATION:   Acute low back pain  is discomfort in your lower back area that lasts for less than 12 weeks. The word acute is used to describe pain that starts suddenly, worsens quickly, and lasts for a short time.   Common symptoms include the following:   Back stiffness or spasms    Pain down the back or side of one leg    Holding yourself in an unusual position or posture to decrease your back pain    Not being able to find a sitting position that is comfortable    Slow increase in your pain for 24 to 48 hours after you stress your back    Tenderness on your lower back or severe pain when you move your back  Seek immediate care for the following symptoms:   Severe pain    Sudden stiffness and heaviness in both buttocks down to both legs    Numbness or weakness in one leg, or pain in both legs    Numbness in your genital area or across your lower back    Unable to control your urine or bowel movements  Treatment for acute low back pain  may include any of the following:  Medicines:      NSAIDs  help decrease swelling and pain or fever. This medicine is available with or without a doctor's order. NSAIDs can cause stomach bleeding or kidney problems in certain people. If you take blood thinner medicine, always ask your healthcare provider if NSAIDs are safe for you. Always read the medicine label and follow directions. Muscle relaxers  help decrease muscle spasms pain. Prescription pain medicine  may be given. Ask how to take this medicine safely. Surgery  may be needed if your pain is severe and other treatments do not work. Surgery may be needed for conditions of the lumbar spine, such as herniated disc or spinal stenosis. Manage your symptoms:   Sleep on a firm mattress. If you do not have a firm mattress, have someone move your mattress to the floor for a few days. A piece of plywood under your mattress can also help make it firmer. Apply ice  on your lower back for 15 to 20 minutes every hour or as directed. Use an ice pack, or put crushed ice in a plastic bag. Cover it with a towel. Ice helps prevent tissue damage and decreases swelling and pain. You can alternate ice and heat.     Apply heat  on your lower back for 20 to 30 minutes every 2 hours for as many days as directed. Heat helps decrease pain and muscle spasms. Go to physical therapy. A physical therapist teaches you exercises to help improve movement and strength, and to decrease pain. Prevent acute low back pain:   Use proper body mechanics. Bend at the hips and knees when you  objects. Do not bend from the waist. Use your leg muscles as you lift the load. Do not use your back. Keep the object close to your chest as you lift it. Try not to twist or lift anything above your waist.    Change your position often when you stand for long periods of time. Rest one foot on a small box or footrest, and then switch to the other foot often. Try not to sit for long periods of time. When you do, sit in a straight-backed chair with your feet flat on the floor. Never reach, pull, or push while you are sitting. Exercise regularly. Warm up before you exercise. Do exercises that strengthen your back muscles. Ask about the best exercise plan for you. Maintain a healthy weight. Ask your healthcare provider how much you should weigh. Ask him to help you create a weight loss plan if you are overweight. Follow up with your healthcare provider as directed:  Return for a follow-up visit if you still have pain after 1 to 3 weeks of treatment. You may need to visit an orthopedist if your back pain lasts more than 6 to 12 weeks. Write down your questions so you remember to ask them during your visits. CARE AGREEMENT:   You have the right to help plan your care. Learn about your health condition and how it may be treated. Discuss treatment options with your caregivers to decide what care you want to receive. You always have the right to refuse treatment. The above information is an  only. It is not intended as medical advice for individual conditions or treatments.  Talk to your doctor, nurse or pharmacist before following any medical regimen to see if it is safe and effective for you. © 2014 [facility] is for End User's use only and may not be sold, redistributed or otherwise used for commercial purposes. All illustrations and images included in CareNotes® are the copyrighted property of A.D.A.M., Inc. or Reginaldo Gaona. Follow up with PCP if not improved, if symptoms are worse, go to the ER.

## 2023-11-21 NOTE — CARE ANYWHERE EVISITS
Visit Summary for Edilbertoscarlett Munoz Helena Mtz - Gender: Male - Date of Birth: [id number]  Date: [id number] - Duration: 13 minutes  Patient: Ai Malik Smith  Provider: Tenisha CORTÉS    Patient Contact Information  Address  [address] [place] [address]; [address] [id number]    Visit Topics    Triage Questions   What is your current physical address in the event of a medical emergency? Answer []  Are you allergic to any medications? Answer []  Are you now or could you be pregnant? Answer []  Do you have any immune system compromise or chronic lung   disease? Answer []  Do you have any vulnerable family members in the home (infant, pregnant, cancer, elderly)? Answer []     Conversation Transcripts  [0A][0A] [Notification] You are connected with Sebastian Victor, Urgent Care Specialist.[0A][Notification] [facility] is located in [facility]. [0A][Notification] [facility] has shared health history. Shikha Vazquez .[0A]    Diagnosis  Strain of muscle, fascia and tendon of lower back, init    Procedures  Value: [id number] Code: CPT-4 UNLISTED E&M SERVICE    Medications Prescribed    No prescriptions ordered    Electronically signed by: Sebastian Collins(NPI [id number])

## 2023-11-21 NOTE — PATIENT INSTRUCTIONS
Rest.  Alternate ice and warm compresses as discussed. Start muscle relaxer for muscle spasms, avoid driving while taking medication. Continue Ibuprofen and you can alternate with tylenol. Continue follow up with PT>  Go to ER with worsening symptoms. Acute Low Back Pain, Ambulatory Care   GENERAL INFORMATION:   Acute low back pain  is discomfort in your lower back area that lasts for less than 12 weeks. The word acute is used to describe pain that starts suddenly, worsens quickly, and lasts for a short time. Common symptoms include the following:   Back stiffness or spasms    Pain down the back or side of one leg    Holding yourself in an unusual position or posture to decrease your back pain    Not being able to find a sitting position that is comfortable    Slow increase in your pain for 24 to 48 hours after you stress your back    Tenderness on your lower back or severe pain when you move your back  Seek immediate care for the following symptoms:   Severe pain    Sudden stiffness and heaviness in both buttocks down to both legs    Numbness or weakness in one leg, or pain in both legs    Numbness in your genital area or across your lower back    Unable to control your urine or bowel movements  Treatment for acute low back pain  may include any of the following:  Medicines:      NSAIDs  help decrease swelling and pain or fever. This medicine is available with or without a doctor's order. NSAIDs can cause stomach bleeding or kidney problems in certain people. If you take blood thinner medicine, always ask your healthcare provider if NSAIDs are safe for you. Always read the medicine label and follow directions. Muscle relaxers  help decrease muscle spasms pain. Prescription pain medicine  may be given. Ask how to take this medicine safely. Surgery  may be needed if your pain is severe and other treatments do not work.  Surgery may be needed for conditions of the lumbar spine, such as herniated disc or spinal stenosis. Manage your symptoms:   Sleep on a firm mattress. If you do not have a firm mattress, have someone move your mattress to the floor for a few days. A piece of plywood under your mattress can also help make it firmer. Apply ice  on your lower back for 15 to 20 minutes every hour or as directed. Use an ice pack, or put crushed ice in a plastic bag. Cover it with a towel. Ice helps prevent tissue damage and decreases swelling and pain. You can alternate ice and heat. Apply heat  on your lower back for 20 to 30 minutes every 2 hours for as many days as directed. Heat helps decrease pain and muscle spasms. Go to physical therapy. A physical therapist teaches you exercises to help improve movement and strength, and to decrease pain. Prevent acute low back pain:   Use proper body mechanics. Bend at the hips and knees when you  objects. Do not bend from the waist. Use your leg muscles as you lift the load. Do not use your back. Keep the object close to your chest as you lift it. Try not to twist or lift anything above your waist.    Change your position often when you stand for long periods of time. Rest one foot on a small box or footrest, and then switch to the other foot often. Try not to sit for long periods of time. When you do, sit in a straight-backed chair with your feet flat on the floor. Never reach, pull, or push while you are sitting. Exercise regularly. Warm up before you exercise. Do exercises that strengthen your back muscles. Ask about the best exercise plan for you. Maintain a healthy weight. Ask your healthcare provider how much you should weigh. Ask him to help you create a weight loss plan if you are overweight. Follow up with your healthcare provider as directed:  Return for a follow-up visit if you still have pain after 1 to 3 weeks of treatment. You may need to visit an orthopedist if your back pain lasts more than 6 to 12 weeks.  Write down your questions so you remember to ask them during your visits. CARE AGREEMENT:   You have the right to help plan your care. Learn about your health condition and how it may be treated. Discuss treatment options with your caregivers to decide what care you want to receive. You always have the right to refuse treatment. The above information is an  only. It is not intended as medical advice for individual conditions or treatments. Talk to your doctor, nurse or pharmacist before following any medical regimen to see if it is safe and effective for you. © 2014 [facility] is for End User's use only and may not be sold, redistributed or otherwise used for commercial purposes. All illustrations and images included in CareNotes® are the copyrighted property of A.D.A.M., Inc. or Reginaldo Gaona.

## 2023-11-22 ENCOUNTER — OFFICE VISIT (OUTPATIENT)
Dept: PHYSICAL THERAPY | Facility: CLINIC | Age: 48
End: 2023-11-22
Payer: COMMERCIAL

## 2023-11-22 DIAGNOSIS — M54.42 ACUTE LEFT-SIDED LOW BACK PAIN WITH LEFT-SIDED SCIATICA: Primary | ICD-10-CM

## 2023-11-22 PROCEDURE — 97110 THERAPEUTIC EXERCISES: CPT | Performed by: PHYSICAL THERAPIST

## 2023-11-22 PROCEDURE — 97530 THERAPEUTIC ACTIVITIES: CPT | Performed by: PHYSICAL THERAPIST

## 2023-11-22 NOTE — PROGRESS NOTES
Daily Note     Today's date: 2023  Patient name: Fidelia Swift  : 1975  MRN: [id number]  Referring provider: Magan Ahn PT  Dx:   Encounter Diagnosis     ICD-10-CM    1. Acute left-sided low back pain with left-sided sciatica  M54.42                      Subjective: Pt reports his HEP will alleviate pain for a few minutes but the pain comes back. His symptoms cont to aggravate L LE to knee with tingling in shin. Occass notices sharp pain L foot but fleeting in nature. On day 3 of muscle relaxer feels this is helping. Pt felt his back was bothering him more than normal, he believes this is related to the centralization phenomenon he discussed in prior session. Objective: See treatment diary below. Baseline tingling L shin, L knee throbbing 5/10. Lumbar AROM: Flexion WFL, Ext mod loss, B SGIS WNL however R SGIS inc pain in L LE. ANGEL forearms on wall x10: Dec tingling, NE knee. 2X10 with vc for dec glute recruitment. REIL abolish shin symptoms, slight dec/B L knee. REIL with clin OP drastic reduction L knee pain. HEP updated      Assessment: Tolerated treatment well. Patient demo inc glute and hip muscle recruitment during ANGEL at wall, vc to dec this and allow gravity to produce extension in this position. Cervical extension during this stretch is appreciated in reducing hip and glute musculature. Pt demo improved response to repeated motions in sagittal plane today as compared to last session. HEP updated to REIL, discussed means to incorporate into night shift and general ADLs. Heightened sensitivity of distal structures but prognosis overall is good as per response to today's session. Plan: Continue per plan of care. Precautions: hx of left cuff repair   Patient provided verbal consent to treatment plan and recommended interventions. Insurance:  AMA/CMS Eval/ Re-eval POC expires Jose Guadalupe Flatness #/ Referral # Total    Start date  Expiration date Extension  Visit limitation? PT only or  PT+OT? Co-Insurance   cms 11.9 1.4.24  Not needed                          Patient provided verbal consent to treatment plan, grade 5 joint mobilization, and recommended interventions.       Manuals 11.9 11.20 11/22/23          L5-S1 gr. 5 FB                                                   Neuro Re-Ed                                                                                                        Ther Ex             Press up 3*10 10x 5x5-10    REIL with clin OP 2 x5          ANGEL with OP 2*10 2*10 against wall 2x10 against wall    1x8 hands on countertop          Prone on elbows 2' 2'           Side glide to right in standing  10x                                                               Ther Activity             Pt edu FB FB KW                       Gait Training                                       Modalities

## 2023-11-28 ENCOUNTER — OFFICE VISIT (OUTPATIENT)
Dept: PHYSICAL THERAPY | Facility: CLINIC | Age: 48
End: 2023-11-28
Payer: COMMERCIAL

## 2023-11-28 DIAGNOSIS — M54.42 ACUTE LEFT-SIDED LOW BACK PAIN WITH LEFT-SIDED SCIATICA: Primary | ICD-10-CM

## 2023-11-28 PROCEDURE — 97110 THERAPEUTIC EXERCISES: CPT | Performed by: PHYSICAL THERAPIST

## 2023-11-28 NOTE — PROGRESS NOTES
Daily Note     Today's date: 2023  Patient name: Gerald Wilde  : 1975  MRN: [id number]  Referring provider: Raleigh Nowak PT  Dx:   Encounter Diagnosis     ICD-10-CM    1. Acute left-sided low back pain with left-sided sciatica  M54.42                      Subjective: Pt reports standing and walking has improved but sitting tolerance is still limited when sitting on a stool to use computer at work. 1 on 1 with PT for 23 minutes. Remaining time independent fitness program.    Objective: See treatment diary below. Assessment: Tolerated treatment well. Patient educated on role of HEP and modifications to perform HEP. Symptoms nearly fully resolved after repeated extensions during session. Plan: Continue per plan of care. Precautions: hx of left cuff repair   Patient provided verbal consent to treatment plan and recommended interventions. Insurance:  AMA/CMS Eval/ Re-eval POC expires Stu Clap #/ Referral # Total    Start date  Expiration date Extension  Visit limitation? PT only or  PT+OT? Co-Insurance   cms 11.9 1.4.24  Not needed                          Patient provided verbal consent to treatment plan, grade 5 joint mobilization, and recommended interventions.       Manuals 11.9 11.20 23 11.28     L5-S1 gr. 5 FB                                   Neuro Re-Ed                                    Ther Ex         Press up 3*10 10x 5x5-10    REIL with clin OP 2 x5 5*10  REIL with clin OP 2 x5     ANGEL with OP 2*10 2*10 against wall 2x10 against wall    1x8 hands on countertop 2*10 against wall with arms straight     Prone on elbows 2' 2'  3*2'     Side glide to right in standing  10x                                           Ther Activity         Pt edu FB FB KW               Gait Training                           Modalities

## 2023-12-05 ENCOUNTER — OFFICE VISIT (OUTPATIENT)
Dept: PHYSICAL THERAPY | Facility: CLINIC | Age: 48
End: 2023-12-05
Payer: COMMERCIAL

## 2023-12-05 DIAGNOSIS — M54.42 ACUTE LEFT-SIDED LOW BACK PAIN WITH LEFT-SIDED SCIATICA: Primary | ICD-10-CM

## 2023-12-05 PROCEDURE — 97110 THERAPEUTIC EXERCISES: CPT | Performed by: PHYSICAL THERAPIST

## 2023-12-05 PROCEDURE — 97140 MANUAL THERAPY 1/> REGIONS: CPT | Performed by: PHYSICAL THERAPIST

## 2023-12-05 NOTE — PROGRESS NOTES
Daily Note     Today's date: 2023  Patient name: Pamela Houston  : 1975  MRN: [id number]  Referring provider: Sven Noble PT  Dx:   Encounter Diagnosis     ICD-10-CM    1. Acute left-sided low back pain with left-sided sciatica  M54.42                      Subjective: Pt reports that his thigh continues to feel very tight, hard time walking, and sleeping. No improvement thus far.   1 on 1 with PT for 23 minutes. Remaining time independent fitness program.    Objective: See treatment diary below. Assessment: Tolerated treatment fair. Continues to present with pain in left knee, thigh, and hip not really changed with intervention today. Referral to pain management placed for possible ALAN if deemed appropriate. Plan: Continue per plan of care. Precautions: hx of left cuff repair   Patient provided verbal consent to treatment plan and recommended interventions. Insurance:  AMA/CMS Eval/ Re-eval POC expires Cherie Lucleland #/ Referral # Total    Start date  Expiration date Extension  Visit limitation? PT only or  PT+OT? Co-Insurance   cms 11.9 1.4.24  Not needed     30                      Patient provided verbal consent to treatment plan, grade 5 joint mobilization, and recommended interventions.       Manuals 11.9 11.20 23 11.28 12.5    visit 1 2 3 4 5    L5-S1 gr. 5 FB        P/a mobs     FB                      Neuro Re-Ed                                    Ther Ex         Press up 3*10 10x 5x5-10    REIL with clin OP 2 x5 5*10  REIL with clin OP 2 x5 2*10    ANGEL with OP 2*10 2*10 against wall 2x10 against wall    1x8 hands on countertop 2*10 against wall with arms straight     Prone on elbows 2' 2'  3*2' 3*2'    Side glide to right in standing  10x       Hips off center     1'    FRIsupine     3'                      Ther Activity         Pt edu FB FB KW               Gait Training                           Modalities

## 2023-12-18 ENCOUNTER — CONSULT (OUTPATIENT)
Dept: PAIN MEDICINE | Facility: CLINIC | Age: 48
End: 2023-12-18
Payer: COMMERCIAL

## 2023-12-18 ENCOUNTER — APPOINTMENT (OUTPATIENT)
Dept: RADIOLOGY | Facility: CLINIC | Age: 48
End: 2023-12-18
Payer: COMMERCIAL

## 2023-12-18 VITALS
SYSTOLIC BLOOD PRESSURE: 136 MMHG | WEIGHT: 255 LBS | BODY MASS INDEX: 34.54 KG/M2 | HEART RATE: 81 BPM | DIASTOLIC BLOOD PRESSURE: 72 MMHG | HEIGHT: 72 IN | TEMPERATURE: 98.6 F

## 2023-12-18 DIAGNOSIS — M54.42 ACUTE LEFT-SIDED LOW BACK PAIN WITH LEFT-SIDED SCIATICA: ICD-10-CM

## 2023-12-18 DIAGNOSIS — M54.16 LEFT LUMBAR RADICULOPATHY: ICD-10-CM

## 2023-12-18 DIAGNOSIS — M25.552 LEFT HIP PAIN: Primary | ICD-10-CM

## 2023-12-18 DIAGNOSIS — M25.552 LEFT HIP PAIN: ICD-10-CM

## 2023-12-18 PROCEDURE — 73502 X-RAY EXAM HIP UNI 2-3 VIEWS: CPT

## 2023-12-18 PROCEDURE — 72120 X-RAY BEND ONLY L-S SPINE: CPT

## 2023-12-18 PROCEDURE — 99204 OFFICE O/P NEW MOD 45 MIN: CPT | Performed by: ANESTHESIOLOGY

## 2023-12-18 RX ORDER — ACETAMINOPHEN 500 MG
500 TABLET ORAL EVERY 6 HOURS PRN
COMMUNITY

## 2023-12-18 RX ORDER — CYCLOBENZAPRINE HCL 10 MG
10 TABLET ORAL 3 TIMES DAILY PRN
Qty: 45 TABLET | Refills: 1 | Status: SHIPPED | OUTPATIENT
Start: 2023-12-18 | End: 2024-01-17

## 2023-12-18 RX ORDER — PREDNISONE 10 MG/1
TABLET ORAL
Qty: 36 TABLET | Refills: 0 | Status: SHIPPED | OUTPATIENT
Start: 2023-12-18

## 2023-12-18 NOTE — PROGRESS NOTES
Assessment  1. Left hip pain    2. Left lumbar radiculopathy    3. Acute left-sided low back pain with left-sided sciatica        Plan      At this point the patient's pain persists despite time, relative rest, activity modification, and nonsteroidal anti-inflammatories. His pain is significantly interfering with his daily living activities.  Is undergone chiropractic treatment as well as a course of physical therapy.  It is appropriate to order an MRI of the lumbar spine to rule out any significant etiology of his symptoms.    I will start him on a titrating dose of oral prednisone to address any inflammatory component of the patient's pain. He understands he should not take nonsteroidal anti-inflammatories until he is finished with this steroid. He understands there is a chance of decreased immunity secondary to steroids. If he has any problems or questions he will give us a call.    Once we obtain MRI results, I will follow-up with the patient, review the results and current symptoms, and discuss the next steps of the treatment plan.    In regards to his testicular pain of asked him to follow-up with the primary care physician but also explained that this could secondary be due to his lumbar spine pathology.    My impressions and treatment recommendations were discussed in detail with the patient who verbalized understanding and had no further questions.  Discharge instructions were provided. I personally saw and examined the patient and I agree with the above discussed plan of care.  This note is created using dictation transcription.  It may contain typographical errors, grammatical errors, improperly dictated words, background noise and other errors.    Orders Placed This Encounter   Procedures    MRI lumbar spine without contrast     Standing Status:   Future     Standing Expiration Date:   12/18/2027     Scheduling Instructions:      There is no preparation for this test. Please leave your jewelry and  valuables at home, wedding rings are the exception. All patients will be required to change into a hospital gown and pants.  Street clothes are not permitted in the MRI.  Magnetic nail polish must be removed prior to arrival for your test. Please bring your insurance cards, a form of photo ID and a list of your medications with you. Arrive 15 minutes prior to your appointment time in order to register. Please bring any prior CT or MRI studies of this area that were not performed at a [facility].            To schedule this appointment, please contact Central Scheduling at [phone].            Prior to your appointment, please make sure you complete the MRI Screening Form when you e-Check in for your appointment. This will be available starting 7 days before your appointment in [facility]. You may receive an e-mail with an activation code if you do not have a [facility] account. If you do not have access to a device, we will complete your screening at your appointment.     Order Specific Question:   What is the patient's sedation requirement? If Medication for Claustrophobia is selected, order medication at this point.     Answer:   No Sedation     Order Specific Question:   Does this procedure require the 3T MRI at [facility] or [facility]?     Answer:   No     Order Specific Question:   Release to patient through [employer]     Answer:   Immediate     Order Specific Question:   Is order priority selected as STAT?     Answer:   No     Order Specific Question:   Reason for Exam (FREE TEXT)     Answer:   left lumbarradic    XR lumbar sp/bending only min 2-3 v     Standing Status:   Future     Standing Expiration Date:   12/18/2027     Scheduling Instructions:      Bring along any outside films relating to this procedure.          XR hip/pelv 2-3 vws left if performed     Standing Status:   Future     Standing Expiration Date:   12/18/2027     Scheduling Instructions:      Bring along any outside films relating to this  procedure.           New Medications Ordered This Visit   Medications    acetaminophen (TYLENOL) 500 mg tablet     Sig: Take 500 mg by mouth every 6 (six) hours as needed for mild pain    predniSONE 10 mg tablet     Sig: Take according to titration schedule     Dispense:  36 tablet     Refill:  0    cyclobenzaprine (FLEXERIL) 10 mg tablet     Sig: Take 1 tablet (10 mg total) by mouth 3 (three) times a day as needed for muscle spasms     Dispense:  45 tablet     Refill:  1     Referred by Eduardo Olmos  History of Present Illness    Tony Mtz is a 48 y.o. male 2-month history of low back and left lower extremity pain initially went to a chiropractor.  Follow-up with a course of physical therapy without relief.  He did go to urgent care.  His pain is severe rates it as 10 out of 10 the visual analog scale despite oral steroids Tylenol and therapy.  It is constant scribes burning cramping shooting is not radiating to his testicle with subjective weakness of her lower limb.  He reports that lying down and sitting increases symptoms but denies any loss of bowel or bladder function.    I have personally reviewed and/or updated the patient's past medical history, past surgical history, family history, social history, current medications, allergies, and vital signs today.     Review of Systems   Constitutional:  Negative for fever and unexpected weight change.   HENT:  Negative for trouble swallowing.    Eyes:  Negative for visual disturbance.   Respiratory:  Negative for shortness of breath and wheezing.    Cardiovascular:  Negative for chest pain and palpitations.   Gastrointestinal:  Negative for constipation, diarrhea, nausea and vomiting.   Endocrine: Negative for cold intolerance, heat intolerance and polydipsia.   Genitourinary:  Negative for difficulty urinating and frequency.   Musculoskeletal:  Positive for arthralgias and myalgias. Negative for gait problem and joint swelling.   Skin:  Negative for rash.  "  Neurological:  Negative for dizziness, seizures, syncope, weakness and headaches.   Hematological:  Does not bruise/bleed easily.   Psychiatric/Behavioral:  Negative for dysphoric mood.    All other systems reviewed and are negative.      Patient Active Problem List   Diagnosis    Mass of left hand    Carpal tunnel syndrome, bilateral    Smoking    Class 2 obesity in adult    Carpal tunnel syndrome of right wrist    Traumatic rotator cuff tear, left, initial encounter    Obstructive sleep apnea    Insomnia    Excessive daytime sleepiness    Circadian rhythm sleep disorder    Sleep deprivation    Other sleep disorders       Past Medical History:   Diagnosis Date    Anesthesia     \"woke up during procedure\"    Elevated hemoglobin A1c     Obstructive sleep apnea 06/13/2023       Past Surgical History:   Procedure Laterality Date    EYE SURGERY      lasik    FOOT SURGERY Bilateral     hammer toe    FOREARM SURGERY Right     HAND SURGERY Left     WA NEUROPLASTY &/TRANSPOS MEDIAN NRV CARPAL TUNNE Left 8/20/2019    Procedure: CARPAL TUNNEL RELEASE;  Surgeon: Rafaela Castillo MD;  Location: [facility];  Service: Orthopedics    WA NEUROPLASTY &/TRANSPOS MEDIAN NRV CARPAL TUNNE Right 12/4/2020    Procedure: RELEASE CARPAL TUNNEL;  Surgeon: Rafaela Castillo MD;  Location:  MAIN OR;  Service: Orthopedics    WA SURGICAL ARTHROSCOPY SHOULDER W/ROTATOR CUFF RPR Left 2/27/2023    Procedure: ARTHROSCOPY SHOULDER- left, rotator cuff repair, biceps tenodesis and all necessary procedures;  Surgeon: Estefani Silva;  Location:  MAIN OR;  Service: Orthopedics    SHOULDER SURGERY Right     x2       Family History   Problem Relation Age of Onset    Diabetes Father     Diabetes Paternal Grandmother        Social History     Occupational History    Not on file   Tobacco Use    Smoking status: Every Day     Current packs/day: 0.50     Types: Cigarettes    Smokeless tobacco: Never    Tobacco comments:     last cigarette at 0644 "   Vaping Use    Vaping status: Never Used   Substance and Sexual Activity    Alcohol use: Yes     Alcohol/week: 4.0 standard drinks of alcohol     Types: 4 Cans of beer per week     Comment: occasionally    Drug use: Never    Sexual activity: Not on file       Current Outpatient Medications on File Prior to Visit   Medication Sig    acetaminophen (TYLENOL) 500 mg tablet Take 500 mg by mouth every 6 (six) hours as needed for mild pain    lidocaine (Lidoderm) 5 % Apply 1 patch topically over 12 hours daily Remove & Discard patch within 12 hours or as directed by MD    [DISCONTINUED] cyclobenzaprine (FLEXERIL) 10 mg tablet Take 1 tablet (10 mg total) by mouth 3 (three) times a day as needed for muscle spasms for up to 5 days    [DISCONTINUED] diclofenac (VOLTAREN) 75 mg EC tablet Take 1 tablet (75 mg total) by mouth 2 (two) times a day (Patient not taking: Reported on 10/5/2023)    [DISCONTINUED] ibuprofen (MOTRIN) 800 mg tablet Take 1 tablet (800 mg total) by mouth every 8 (eight) hours as needed for mild pain    [DISCONTINUED] nicotine (NICODERM CQ) 21 mg/24 hr TD 24 hr patch Place 1 patch on the skin over 24 hours every 24 hours (Patient not taking: Reported on 4/20/2023)     No current facility-administered medications on file prior to visit.       Allergies   Allergen Reactions    Adhesive [Medical Tape] Hives    Penicillins Rash     Pt reported rash on left side ear, neck, arm, both thighs, right arm and chest after taking 2 days of PCN. No resp distress or swelling of lips, tongue or throat. Rx'd Benadryl.       Physical Exam    /72 (BP Location: Left arm, Patient Position: Sitting, Cuff Size: Large)   Pulse 81   Temp 98.6 °F (37 °C)   Ht 6' (1.829 m)   Wt 116 kg (255 lb)   BMI 34.58 kg/m²     Constitutional: normal, well developed, well nourished, alert, in no distress and non-toxic and no overt pain behavior. and overweight  Eyes: anicteric  HEENT: grossly intact  Neck: supple, symmetric, trachea  midline and no masses   Pulmonary:even and unlabored  Cardiovascular:No edema or pitting edema present  Skin:Normal without rashes or lesions and well hydrated  Psychiatric:Mood and affect appropriate  Neurologic:Cranial Nerves II-XII grossly intact  Musculoskeletal:normal, difficulty going from sitting to standing sitting position; no obvious skin lesions or erythema lumbar sacral spine; mild tenderness in lumbar paravertebrals, no sacroiliac or greater trochanteric tenderness bilateral; deep tendon reflexes are diminished but symmetrical bilateral Achilles absent left patella 1+ right patella 4-5 strength left extensor hallux longus and left knee extension no other motor deficits are appreciated.; positive straight leg raising on the left    Imaging  XRAY LUMBAR SPINE 10/29/2023       INDICATION:   M79.605: Pain in left leg.     COMPARISON:  None     VIEWS:  XR SPINE LUMBAR 2 OR 3 VIEWS INJURY        FINDINGS:     There are 5 non rib bearing lumbar vertebral bodies.     There is no evidence of acute fracture or destructive osseous lesion.     Mild levoscoliosis of the mid lumbar spine. Normal lordosis.     No significant lumbar degenerative change noted.     The pedicles appear intact.     There are atherosclerotic calcifications. Soft tissues are otherwise unremarkable.     IMPRESSION:     Normal examination.    I have personally reviewed pertinent films in PACS and my interpretation is spondylosis.

## 2024-01-06 ENCOUNTER — HOSPITAL ENCOUNTER (OUTPATIENT)
Dept: MRI IMAGING | Facility: HOSPITAL | Age: 49
Discharge: HOME | End: 2024-01-06
Attending: ANESTHESIOLOGY
Payer: COMMERCIAL

## 2024-01-06 ENCOUNTER — HOSPITAL ENCOUNTER (OUTPATIENT)
Dept: RADIOLOGY | Facility: HOSPITAL | Age: 49
Discharge: HOME | End: 2024-01-06

## 2024-01-06 DIAGNOSIS — M54.16 LEFT LUMBAR RADICULOPATHY: ICD-10-CM

## 2024-01-06 PROCEDURE — 72148 MRI LUMBAR SPINE W/O DYE: CPT

## 2024-01-06 PROCEDURE — G1004 CDSM NDSC: HCPCS

## 2024-01-11 ENCOUNTER — TELEPHONE (OUTPATIENT)
Dept: PAIN MEDICINE | Facility: CLINIC | Age: 49
End: 2024-01-11

## 2024-01-11 NOTE — TELEPHONE ENCOUNTER
----- Message from Gucci Teresa DO sent at 1/11/2024  8:09 AM EST -----  MRI reveals discrimination as well as paraspinal spasm, at this point I would like him to schedule follow-up appointment with NP/PA to review MRI and discuss neck step with treatment plan

## 2024-01-11 NOTE — TELEPHONE ENCOUNTER
S/w pt, advised of above. Pt verbalized understanding and appreciation. Scheduled fu ov on 1/23/2024 with MG - 30 min. Advised pt, this office will cb if there is a sooner ov.

## 2024-01-12 ENCOUNTER — OFFICE VISIT (OUTPATIENT)
Dept: SLEEP CENTER | Facility: CLINIC | Age: 49
End: 2024-01-12
Payer: COMMERCIAL

## 2024-01-12 VITALS
HEIGHT: 74 IN | SYSTOLIC BLOOD PRESSURE: 144 MMHG | WEIGHT: 264 LBS | DIASTOLIC BLOOD PRESSURE: 86 MMHG | BODY MASS INDEX: 33.88 KG/M2

## 2024-01-12 DIAGNOSIS — Z78.9 DIFFICULTY USING CONTINUOUS POSITIVE AIRWAY PRESSURE (CPAP) DEVICE: ICD-10-CM

## 2024-01-12 DIAGNOSIS — G47.33 OBSTRUCTIVE SLEEP APNEA: Primary | ICD-10-CM

## 2024-01-12 PROCEDURE — 99214 OFFICE O/P EST MOD 30 MIN: CPT | Performed by: NURSE PRACTITIONER

## 2024-01-12 NOTE — PROGRESS NOTES
Progress Note - [facility]   Tony Mtz 48 y.o. male   :1975, MRN: [id number]  2024          Follow Up Evaluation / Problem:     Severe Obstructive Sleep Apnea      Thank you for the opportunity of participating in the evaluation and care of this patient in the Sleep Clinic at [facility].      HPI: Tony Mtz is a 48 y.o. year old male.  The patient presents for follow up of severe obstructive sleep apnea.  He reported loud snoring, gasping, choking and periods of apnea during sleep.  He wakes up frequently during the night and has difficulty returning to sleep.  He tried use of trazodone and ambien, but still woke up during the night and felt groggy in the morning.    He completed a home sleep study.  Testing did not confirm ANGELICA.  NEETA was only 3.7, with oxygen riana of 82%.  Due to hypoxemia and severity of reported symptoms, a diagnostic sleep study was completed.  AHI was 40.2 events per hour, worsening to 90.5 when supine.  Oxygen riana was 81% with 49.3 minutes spent at saturations less than 89%, confirming severe obstructive sleep apnea.  A CPAP titration study was recommended, however, to expedite treatment, APAP was started.  He returns to review compliance and effectiveness of treatment.      Current Outpatient Medications:     acetaminophen (TYLENOL) 500 mg tablet, Take 500 mg by mouth every 6 (six) hours as needed for mild pain, Disp: , Rfl:     cyclobenzaprine (FLEXERIL) 10 mg tablet, Take 1 tablet (10 mg total) by mouth 3 (three) times a day as needed for muscle spasms, Disp: 45 tablet, Rfl: 1    lidocaine (Lidoderm) 5 %, Apply 1 patch topically over 12 hours daily Remove & Discard patch within 12 hours or as directed by MD (Patient not taking: Reported on 2024), Disp: 15 patch, Rfl: 0    predniSONE 10 mg tablet, Take according to titration schedule (Patient not taking: Reported on 2024), Disp: 36 tablet,  "Rfl: 0    How likely are you to doze off or fall asleep in the following situations, in contrast to feeling just tired?  Sitting and reading: Slight chance of dozing  Watching TV: High chance of dozing  Sitting, inactive in a public place (e.g. a theatre or a meeting): Moderate chance of dozing  As a passenger in a car for an hour without a break: Moderate chance of dozing  Lying down to rest in the afternoon when circumstances permit: Moderate chance of dozing  Sitting and talking to someone: Would never doze  Sitting quietly after a lunch without alcohol: Slight chance of dozing  In a car, while stopped for a few minutes in traffic: Would never doze  Total score: 11              Vitals:    01/12/24 0917   BP: 144/86   Weight: 120 kg (264 lb)   Height: 6' 1.83\" (1.875 m)       Body mass index is 34.05 kg/m².            EPWORTH SLEEPINESS SCORE  Total score: 11      Past History Since Last Sleep Center Visit:   He states that he began use of PAP therapy in October.  He initially had difficulty using the equipment, due to reports of congestion when using it.  He discussed this with the DME rep via phone and the humidity setting was adjusted for him.  He used it when he slept.  Shortly after receiving the equipment, he developed severe back pain.  Back pain has limited his ability to sleep.  He reports that he can only lay down briefly and needs to get up and down out of bed, as well as reposition frequently.  He has not been using the equipment, due to same.  He is unable to make any assessment of improvement in sleep apnea, due to back pain affecting his ability to sleep.  He has follow up with pain management in the near future since having PT and an MRI.        The review of systems and following portions of the patient's history were reviewed and updated as appropriate: allergies, current medications, past family history, past medical history, past social history, past surgical history, and problem " list.        OBJECTIVE  Equipment set up date:  10/4/23 - ResMed AirSense 11  PAP Pressure: Nasal AutoPAP using a lower limit of 5 cm and an upper limit of 15 cm of water pressure  Type of mask used: full face  DME Provider: [facility]    Physical Exam:     General Appearance:   Alert, cooperative, no distress, appears stated age, obese     Lungs:    Clear to auscultation bilaterally, respirations unlabored     Heart:   Regular rate and rhythm, S1 and S2 normal, no murmur, rub or gallop           ASSESSMENT / PLAN    1. Obstructive sleep apnea  PAP DME Resupply/Reorder    CPAP Study      2. Difficulty using continuous positive airway pressure (CPAP) device  CPAP Study              Counseling / Coordination of Care  I have spent a total time of 25 minutes on 01/12/24 in caring for this patient including Risks and benefits of tx options, Instructions for management, Patient and family education, Importance of tx compliance, Risk factor reductions, Impressions, Counseling / Coordination of care, Documenting in the medical record, and Reviewing / ordering tests, medicine, procedures  .      Today I reviewed the patient's compliance data.  he has been able to use the equipment 70% of all days recorded.  Average usage was 4 or more hours 3% of all days recorded.  The patient uses the equipment for an average of 1 hours and 53 minutes per night.  The estimated AHI is 0.7 abnormal breathing events per hour.  He is not at goal for hours of sleep, however, AHI is now normal.  This is difficult to assess, since he may have been awake for much of the time he was using it.  The patient is unable to determine improvements in ANGELICA with use of CPAP, due to severe back pain.  It is medically necessary for him to have an extension to his compliance period, due to comorbid conditions affecting him during start of treatment.  He also has had some difficulty using the equipment.  A CPAP titration study was initially recommended and has  been ordered.  He was advised to continue to try to use the equipment until the study.  A prescription for supplies has been provided to last for the next year.    He will continue using this equipment at the settings noted above until after the CPAP titration study.  Any necessary adjustments to therapy will be made at that time.  He will  return for a routine follow-up evaluation after testing. I have asked the patient to contact the Sleep Disorders Center if he encounters any difficulties prior to that time.    The following instructions have been given to the patient today:    Patient Instructions    Continue to use CPAP equipment  Call if you are having difficulty, so we can trouble shoot and make it more comfortable for you  Schedule follow up after sleep study  Call if insurance requires you to return your equipment so we can change the study type        Nursing Support:  When: Monday through Friday 7A-5PM except holidays  Where: Our direct line is [phone].    If you are having a true emergency please call 911.  In the event that the line is busy or it is after hours please leave a voice message and we will return your call.  Please speak clearly, leaving your full name, birth date, best number to reach you and the reason for your call.   Medication refills: We will need the name of the medication, the dosage, the ordering provider, whether you get a 30 or 90 day refill, and the pharmacy name and address.  Medications will be ordered by the provider only.  Nurses cannot call in prescriptions.  Please allow 7 days for medication refills.  Physician requested updates: If your provider requested that you call with an update after starting medication, please be ready to provide us the medication and dosage, what time you take your medication, the time you attempt to fall asleep, time you fall asleep, when you wake up, and what time you get out of bed.  Sleep Study Results: We will contact you with sleep  "study results and/or next steps after the physician has reviewed your testing.        MILANA Brown  [facility] [place]      Portions of the record may have been created with voice recognition software. Occasional wrong word or \"sound a like\" substitutions may have occurred due to the inherent limitations of voice recognition software. Read the chart carefully and recognize, using context, where substitutions have occurred.       "

## 2024-01-12 NOTE — PATIENT INSTRUCTIONS
Continue to use CPAP equipment  Call if you are having difficulty, so we can trouble shoot and make it more comfortable for you  Schedule follow up after sleep study  Call if insurance requires you to return your equipment so we can change the study type        Nursing Support:  When: Monday through Friday 7A-5PM except holidays  Where: Our direct line is [phone].    If you are having a true emergency please call 911.  In the event that the line is busy or it is after hours please leave a voice message and we will return your call.  Please speak clearly, leaving your full name, birth date, best number to reach you and the reason for your call.   Medication refills: We will need the name of the medication, the dosage, the ordering provider, whether you get a 30 or 90 day refill, and the pharmacy name and address.  Medications will be ordered by the provider only.  Nurses cannot call in prescriptions.  Please allow 7 days for medication refills.  Physician requested updates: If your provider requested that you call with an update after starting medication, please be ready to provide us the medication and dosage, what time you take your medication, the time you attempt to fall asleep, time you fall asleep, when you wake up, and what time you get out of bed.  Sleep Study Results: We will contact you with sleep study results and/or next steps after the physician has reviewed your testing.     fall from standing, no dizzy/LOC. on ASA. L hip pain

## 2024-01-15 ENCOUNTER — TELEPHONE (OUTPATIENT)
Dept: SLEEP CENTER | Facility: CLINIC | Age: 49
End: 2024-01-15

## 2024-01-18 LAB

## 2024-01-23 ENCOUNTER — OFFICE VISIT (OUTPATIENT)
Dept: PAIN MEDICINE | Facility: CLINIC | Age: 49
End: 2024-01-23
Payer: COMMERCIAL

## 2024-01-23 VITALS
HEART RATE: 84 BPM | DIASTOLIC BLOOD PRESSURE: 92 MMHG | HEIGHT: 73 IN | BODY MASS INDEX: 34.85 KG/M2 | WEIGHT: 263 LBS | TEMPERATURE: 98.2 F | SYSTOLIC BLOOD PRESSURE: 144 MMHG

## 2024-01-23 DIAGNOSIS — M54.16 LUMBAR RADICULOPATHY: ICD-10-CM

## 2024-01-23 DIAGNOSIS — M51.26 LUMBAR DISC HERNIATION: Primary | ICD-10-CM

## 2024-01-23 PROCEDURE — 99214 OFFICE O/P EST MOD 30 MIN: CPT | Performed by: PHYSICIAN ASSISTANT

## 2024-01-23 NOTE — PROGRESS NOTES
Assessment:  1. Lumbar disc herniation    2. Lumbar radiculopathy        Plan:  While the patient was in the office today, I did have a thorough conversation regarding their chronic pain syndrome, medication management, and treatment plan options.    After discussing options and upon review of his recent MRI, I recommended the patient proceed with a left L2 and L3 transforaminal epidural steroid injection to address the radicular component of his pain pattern.  He was made aware that injections may need to be repeated.    If he does not respond to injection therapy then I would refer the patient to our neurosurgical team to discuss the possible need for surgical intervention.      The patient was advised to contact the office should their symptoms worsen in the interim. The patient was agreeable and verbalized an understanding.        History of Present Illness:    The patient is a 48 y.o. male last seen on 12/18/2023 who presents for a follow up office visit in regards to low back pain.  The patient currently reports unchanged left-sided low back pain with radiation to the left groin, medial and anterior thigh and into the knee area.  He rates his current pain a 10 out of 10 and describes it as constant sharp, throbbing, cramping, pressure-like and shooting type of pain with associated numbness and paresthesias along that distribution.  Patient has subjective weakness of the leg.  He has exhausted all conservative efforts including physical therapy, chiropractic care, courses of prednisone, anti-inflammatory medications and muscle relaxants.  Since his last visit, he had the MRI of the lumbar spine today and presents to review that and discuss the next steps in his treatment plan.    I have personally reviewed and/or updated the patient's past medical history, past surgical history, family history, social history, current medications, allergies, and vital signs today.       Review of Systems:    Review of Systems  "  Respiratory:  Negative for shortness of breath.    Cardiovascular:  Negative for chest pain.   Gastrointestinal:  Negative for constipation, diarrhea, nausea and vomiting.   Musculoskeletal:  Positive for gait problem. Negative for arthralgias, joint swelling (Joint stiffness) and myalgias.   Skin:  Negative for rash.   Neurological:  Positive for weakness. Negative for dizziness and seizures.   All other systems reviewed and are negative.        Past Medical History:   Diagnosis Date   • Anesthesia     \"woke up during procedure\"   • Elevated hemoglobin A1c    • Obstructive sleep apnea 06/13/2023       Past Surgical History:   Procedure Laterality Date   • EYE SURGERY      lasik   • FOOT SURGERY Bilateral     hammer toe   • FOREARM SURGERY Right    • HAND SURGERY Left    • [place] NEUROPLASTY &/TRANSPOS MEDIAN NRV CARPAL TUNNE Left 8/20/2019    Procedure: CARPAL TUNNEL RELEASE;  Surgeon: Rafaela Castillo MD;  Location: [facility];  Service: Orthopedics   • [place] NEUROPLASTY &/TRANSPOS MEDIAN NRV CARPAL TUNNE Right 12/4/2020    Procedure: RELEASE CARPAL TUNNEL;  Surgeon: Rafaela Castillo MD;  Location:  MAIN OR;  Service: Orthopedics   • [place] SURGICAL ARTHROSCOPY SHOULDER W/ROTATOR CUFF RPR Left 2/27/2023    Procedure: ARTHROSCOPY SHOULDER- left, rotator cuff repair, biceps tenodesis and all necessary procedures;  Surgeon: Estefani Silva;  Location:  MAIN OR;  Service: Orthopedics   • SHOULDER SURGERY Right     x2       Family History   Problem Relation Age of Onset   • Diabetes Father    • Diabetes Paternal Grandmother        Social History     Occupational History   • Not on file   Tobacco Use   • Smoking status: Every Day     Current packs/day: 0.50     Types: Cigarettes   • Smokeless tobacco: Never   • Tobacco comments:     last cigarette at 0644   Vaping Use   • Vaping status: Never Used   Substance and Sexual Activity   • Alcohol use: Yes     Alcohol/week: 4.0 standard drinks of alcohol     Types: 4 Cans of " "beer per week     Comment: occasionally   • Drug use: Never   • Sexual activity: Not on file         Current Outpatient Medications:   •  acetaminophen (TYLENOL) 500 mg tablet, Take 500 mg by mouth every 6 (six) hours as needed for mild pain, Disp: , Rfl:   •  lidocaine (Lidoderm) 5 %, Apply 1 patch topically over 12 hours daily Remove & Discard patch within 12 hours or as directed by MD (Patient not taking: Reported on 1/12/2024), Disp: 15 patch, Rfl: 0    Allergies   Allergen Reactions   • Adhesive [Medical Tape] Hives   • Penicillins Rash     Pt reported rash on left side ear, neck, arm, both thighs, right arm and chest after taking 2 days of PCN. No resp distress or swelling of lips, tongue or throat. Rx'd Benadryl.       Physical Exam:    /92 (BP Location: Left arm, Patient Position: Sitting, Cuff Size: Large)   Pulse 84   Temp 98.2 °F (36.8 °C)   Ht 6' 1\" (1.854 m)   Wt 119 kg (263 lb)   BMI 34.70 kg/m²     Constitutional:normal, well developed, well nourished, alert, in no distress and non-toxic and no overt pain behavior. and overweight  Eyes:anicteric  HEENT:grossly intact  Neck:supple, symmetric, trachea midline and no masses   Pulmonary:even and unlabored  Cardiovascular:No edema or pitting edema present  Skin:Normal without rashes or lesions and well hydrated  Psychiatric:Mood and affect appropriate  Neurologic:Cranial Nerves II-XII grossly intact  Musculoskeletal: Gait is antalgic, favoring the right side, positive straight leg raise test left side in seated position      Imaging  MRI LUMBAR SPINE WITHOUT CONTRAST     INDICATION: M54.16: Radiculopathy, lumbar region.     COMPARISON: X-rays dated 12/18/2023.     TECHNIQUE:  Multiplanar, multisequence imaging of the lumbar spine was performed. .        IMAGE QUALITY:  Diagnostic     FINDINGS:     VERTEBRAL BODIES:  There are 5 lumbar type vertebral bodies. Minimal levoscoliosis of the lumbar spine. No spondylolisthesis or spondylolysis. " Moderate size hemangioma within the L2 vertebral body.     SACRUM:  Normal signal within the sacrum. No evidence of insufficiency or stress fracture.     DISTAL CORD AND CONUS:  Normal size and signal within the distal cord and conus.     PARASPINAL SOFT TISSUES:  Paraspinal soft tissues are unremarkable.     LOWER THORACIC DISC SPACES:  Normal disc height and signal.  No disc herniation, canal stenosis or foraminal narrowing.     LUMBAR DISC SPACES:     L1-L2:  Normal.     L2-L3: Mild annular bulging with a moderate focal central and bilateral subarticular disc herniation, extrusion type extending slightly inferiorly within the anterior epidural space. On the right there is partial effacement of the epidural space and mild   mass effect upon the ventral aspect of the thecal sac where there is mass effect upon the right L3 nerve within the anterior lateral aspect of the thecal sac. There is also inferior extrusion on the left extending into the lateral recess with mass   effect upon the left L3 nerve. There is no foraminal nerve impingement.     L3-L4: Annular bulging with a tiny central and right-sided disc herniation and associated annular fissure. There is mild resulting canal stenosis without foraminal narrowing or nerve impingement.     L4-L5: Disc desiccation and loss of disc height. Mild annular bulging with a small left subarticular disc herniation with associated annular fissure. Minor facet arthropathy. No significant canal stenosis. Minimal left foraminal narrowing without nerve   impingement.     L5-S1: Normal disc height and signal without disc herniation, canal stenosis or foraminal narrowing.     OTHER FINDINGS: There is mild edema identified within the left lateral posterior paraspinal musculature at the L5-S1 level best appreciated on series 6 images 16 through 25 and sagittal inversion recovery imaging, series 5 images 2 through 6. This is   nonspecific and may represent muscle strain.      IMPRESSION:     Lumbar degenerative disc disease including a disc extrusion at the L2-3 level extending inferiorly into the lateral recess. At the level of the disc space there is mass effect upon the right anterior lateral aspect of the thecal sac and more inferiorly   there is left lateral recess stenosis. Correlate for bilateral L3 radiculopathy.     Additional mild noncompressive lumbar degenerative change at the L3-4 and L4-5 levels.     Focal paraspinal muscle edema is identified within the left lateral aspect of the posterior paraspinal muscles at the lumbosacral junction without a discrete intramuscular mass. This is nonspecific and may represent muscle strain.        [place] spine and pain procedure    (Results Pending)         Orders Placed This Encounter   Procedures   • [place] spine and pain procedure

## 2024-01-23 NOTE — PATIENT INSTRUCTIONS
Epidural Steroid Injection   WHAT YOU NEED TO KNOW:   What do I need to know about an epidural steroid injection (ALAN)?  An ALAN is a procedure to inject steroid medicine into the epidural space. The epidural space is between your spinal cord and vertebrae. Steroids reduce inflammation and fluid buildup in your spine that may be causing pain. You may be given pain medicine along with the steroids.        How do I prepare for an ALAN?  Your provider will talk to you about how to prepare for your procedure. He or she will tell you what medicines to take or not take on the day of your procedure. You may need to stop taking blood thinners or other medicines several days before your procedure. You may need to adjust any diabetes medicine you take on the day of your procedure. Steroid medicine can increase your blood sugar level. Arrange for someone to drive you home when you are discharged.  What will happen during an ALAN?   You will be given medicine to numb the procedure area. You will be awake for the procedure, but you will not feel pain. You may also be given medicine to help you relax. Contrast liquid will be used to help your provider see the area better. Tell the provider if you have ever had an allergic reaction to contrast liquid.    Your provider may place the needle into your neck area, middle of your back, or tailbone area. He or she may inject the medicine next to the nerves that are causing your pain. He or she may instead inject the medicine into a larger area of the epidural space. This helps the medicine spread to more nerves. Your provider will use a fluoroscope to help guide the needle to the right place. A fluoroscope is a type of x-ray. After the procedure, a bandage will be placed over the injection site to prevent infection.    What will happen after an ALAN?  You will have a bandage over the injection site to prevent infection. Your provider will tell you when you can bathe and any activity  guidelines. You will be able to go home.  What are the risks of an ALAN?  You may have temporary or permanent nerve damage or paralysis. You may have bleeding or develop a serious infection, such as meningitis (swelling of the brain coverings). An abscess may also develop. An abscess is a pus-filled area under the skin. You may need surgery to fix the abscess. You may have a seizure, anxiety, or trouble sleeping. If you are a man, you may have temporary erectile dysfunction (not able to have an erection).   CARE AGREEMENT:   You have the right to help plan your care. Learn about your health condition and how it may be treated. Discuss treatment options with your healthcare providers to decide what care you want to receive. You always have the right to refuse treatment. The above information is an  only. It is not intended as medical advice for individual conditions or treatments. Talk to your doctor, nurse or pharmacist before following any medical regimen to see if it is safe and effective for you.  © Copyright [employer] 2023 Information is for End User's use only and may not be sold, redistributed or otherwise used for commercial purposes.

## 2024-02-05 ENCOUNTER — HOSPITAL ENCOUNTER (OUTPATIENT)
Dept: RADIOLOGY | Facility: CLINIC | Age: 49
Discharge: HOME | End: 2024-02-05
Admitting: ANESTHESIOLOGY
Payer: COMMERCIAL

## 2024-02-05 VITALS
RESPIRATION RATE: 18 BRPM | TEMPERATURE: 98.4 F | DIASTOLIC BLOOD PRESSURE: 76 MMHG | OXYGEN SATURATION: 95 % | HEART RATE: 87 BPM | SYSTOLIC BLOOD PRESSURE: 140 MMHG

## 2024-02-05 DIAGNOSIS — M51.26 LUMBAR DISC HERNIATION: Primary | ICD-10-CM

## 2024-02-05 DIAGNOSIS — M51.26 LUMBAR DISC HERNIATION: ICD-10-CM

## 2024-02-05 DIAGNOSIS — M54.16 LUMBAR RADICULOPATHY: ICD-10-CM

## 2024-02-05 PROCEDURE — 64483 NJX AA&/STRD TFRM EPI L/S 1: CPT | Performed by: ANESTHESIOLOGY

## 2024-02-05 PROCEDURE — 64484 NJX AA&/STRD TFRM EPI L/S EA: CPT | Performed by: ANESTHESIOLOGY

## 2024-02-05 RX ORDER — PAPAVERINE HCL 150 MG
15 CAPSULE, EXTENDED RELEASE ORAL ONCE
Status: COMPLETED | OUTPATIENT
Start: 2024-02-05 | End: 2024-02-05

## 2024-02-05 RX ADMIN — DEXAMETHASONE SODIUM PHOSPHATE 15 MG: 10 INJECTION, SOLUTION INTRAMUSCULAR; INTRAVENOUS at 13:12

## 2024-02-05 RX ADMIN — IOHEXOL 2 ML: 300 INJECTION, SOLUTION INTRAVENOUS at 13:11

## 2024-02-05 NOTE — H&P
"History of Present Illness: The patient is a 48 y.o. male who presents with complaints of low back and leg pain.    Past Medical History:   Diagnosis Date    Anesthesia     \"woke up during procedure\"    Elevated hemoglobin A1c     Obstructive sleep apnea 06/13/2023       Past Surgical History:   Procedure Laterality Date    EYE SURGERY      lasik    FOOT SURGERY Bilateral     hammer toe    FOREARM SURGERY Right     HAND SURGERY Left     [place] NEUROPLASTY &/TRANSPOS MEDIAN NRV CARPAL TUNNE Left 8/20/2019    Procedure: CARPAL TUNNEL RELEASE;  Surgeon: Rafaela Castillo MD;  Location[facility];  Service: Orthopedics    [place] NEUROPLASTY &/TRANSPOS MEDIAN NRV CARPAL TUNNE Right 12/4/2020    Procedure: RELEASE CARPAL TUNNEL;  Surgeon: Rafaela Castillo MD;  [facility];  Service: Orthopedics    [place] SURGICAL ARTHROSCOPY SHOULDER W/ROTATOR CUFF RPR Left 2/27/2023    Procedure: ARTHROSCOPY SHOULDER- left, rotator cuff repair, biceps tenodesis and all necessary procedures;  Surgeon: Estefani Silva;  Location:  MAIN OR;  Service: Orthopedics    SHOULDER SURGERY Right     x2         Current Outpatient Medications:     acetaminophen (TYLENOL) 500 mg tablet, Take 500 mg by mouth every 6 (six) hours as needed for mild pain, Disp: , Rfl:     lidocaine (Lidoderm) 5 %, Apply 1 patch topically over 12 hours daily Remove & Discard patch within 12 hours or as directed by MD (Patient not taking: Reported on 1/12/2024), Disp: 15 patch, Rfl: 0    Current Facility-Administered Medications:     dexamethasone (PF) (DECADRON) injection 15 mg, 15 mg, Epidural, Once, Gucci Teresa,     iohexol (OMNIPAQUE) 300 mg/mL injection 2 mL, 2 mL, Epidural, Once, Gucci Danielsev, DO    Allergies   Allergen Reactions    Adhesive [Medical Tape] Hives    Penicillins Rash     Pt reported rash on left side ear, neck, arm, both thighs, right arm and chest after taking 2 days of PCN. No resp distress or swelling of lips, tongue or throat. Rx'd Benadryl. "       Physical Exam:   General: Awake, Alert, Oriented x 3, Mood and affect appropriate  Respiratory: Respirations even and unlabored  Cardiovascular: Peripheral pulses intact; no edema  Musculoskeletal Exam: Normal gait    ASA Score: III    Patient/Chart Verification  Patient ID Verified: Verbal  ID Band Applied: No  Consents Confirmed: Procedural, To be obtained in the Pre-Procedure area  Interval H&P(within 24 hr) Complete (required for Outpatients and Surgery Admit only): To be obtained in the Pre-Procedure area  Allergies Reviewed: Yes  Anticoag/NSAID held?: NA  Currently on antibiotics?: No    Assessment:   1. Lumbar disc herniation    2. Lumbar radiculopathy        Plan: LT L2, L3 TFESI

## 2024-02-05 NOTE — DISCHARGE INSTRUCTIONS
Epidural Steroid Injection   WHAT YOU NEED TO KNOW:   An epidural steroid injection (ALAN) is a procedure to inject steroid medicine into the epidural space. The epidural space is between your spinal cord and vertebrae. Steroids reduce inflammation and fluid buildup in your spine that may be causing pain. You may be given pain medicine along with the steroids.          ACTIVITY  Do not drive or operate machinery today.  No strenuous activity today - bending, lifting, etc.  You may resume normal activites starting tomorrow - start slowly and as tolerated.  You may shower today, but no tub baths or hot tubs.  You may have numbness for several hours from the local anesthetic. Please use caution and common sense, especially with weight-bearing activities.    CARE OF THE INJECTION SITE  If you have soreness or pain, apply ice to the area today (20 minutes on/20 minutes off).  Starting tomorrow, you may use warm, moist heat or ice if needed.  You may have an increase or change in your discomfort for 36-48 hours after your treatment.  Apply ice and continue with any pain medication you have been prescribed.  Notify the [facility] if you have any of the following: redness, drainage, swelling, headache, stiff neck or fever above 100°F.    SPECIAL INSTRUCTIONS  Our office will contact you in approximately 7 days for a progress report.    MEDICATIONS  Continue to take all routine medications.  Our office may have instructed you to hold some medications.    As no general anesthesia was used in today's procedure, you should not experience any side effects related to anesthesia.     If you are diabetic, the steroids used in today's injection may temporarily increase your blood sugar levels after the first few days after your injection. Please keep a close eye on your sugars and alert the doctor who manages your diabetes if your sugars are significantly high from your baseline or you are symptomatic.     If you have a  problem specifically related to your procedure, please call our office at [phone].  Problems not related to your procedure should be directed to your primary care physician.

## 2024-02-08 ENCOUNTER — CONSULT (OUTPATIENT)
Dept: NEUROSURGERY | Facility: CLINIC | Age: 49
End: 2024-02-08
Payer: COMMERCIAL

## 2024-02-08 VITALS
TEMPERATURE: 98.2 F | DIASTOLIC BLOOD PRESSURE: 82 MMHG | BODY MASS INDEX: 34.13 KG/M2 | RESPIRATION RATE: 17 BRPM | HEART RATE: 80 BPM | OXYGEN SATURATION: 96 % | WEIGHT: 252 LBS | SYSTOLIC BLOOD PRESSURE: 140 MMHG | HEIGHT: 72 IN

## 2024-02-08 DIAGNOSIS — Z01.818 PRE-PROCEDURAL EXAMINATION: Primary | ICD-10-CM

## 2024-02-08 DIAGNOSIS — M51.26 LUMBAR DISC HERNIATION: ICD-10-CM

## 2024-02-08 PROCEDURE — 99204 OFFICE O/P NEW MOD 45 MIN: CPT | Performed by: STUDENT IN AN ORGANIZED HEALTH CARE EDUCATION/TRAINING PROGRAM

## 2024-02-08 RX ORDER — CHLORHEXIDINE GLUCONATE ORAL RINSE 1.2 MG/ML
15 SOLUTION DENTAL ONCE
OUTPATIENT
Start: 2024-02-08 | End: 2024-02-08

## 2024-02-08 NOTE — PROGRESS NOTES
Assessment/Plan:    48-year-old male presented clinic for evaluation of low back pain and left lower extremity pain that has been present for 3 months.  MRI lumbar spine shows left-sided L2-3 herniated disc.  His symptoms fit a left-sided L3 radiculopathy.  He has tried physical therapy and injections and is failed conservative treatment.  I recommended a left-sided L2-3 microdiscectomy.  I discussed risk benefits along with alternatives, all questions were answered, he agreed to proceed and consent was obtained.  Will proceed with surgery scheduling.    I spent 45 minutes obtaining history and physical exam, reviewing imaging, discussing treatment options, and coordinating care.      Subjective:      Patient ID: Tony Mtz is a 48 y.o. male.    HPI    48-year-old male presented clinic for evaluation of low back pain and left lower extremity pain that has been present for 3 months.  The pain fits left-sided L3 dermatomal distribution.  He states that it began roughly 3 months ago after an incident at work.  The pain begins in his lumbar spine and radiates down the left lower extremity in the posterior lateral buttock and anterior thigh and ends in his knee.  He does not radiate down to his lower extremities.  The pain does not involve the right side.  It is 10 out of 10 in severity is worse and worse with movement and better with recumbency.  The pain feels like a sharp stabbing sensation.  He had MRI lumbar spine that showed a left-sided L2-3 herniated disc.  He has tried physical therapy and an epidural steroid injection.  His epidural steroid injection gave him relief for approximately 1 to 2 hours and then the pain returned.  He presents to clinic to discussed surgical treatment options.    The following portions of the patient's history were reviewed and updated as appropriate: allergies, current medications, past family history, past medical history, past social history, past surgical history, and problem  list.    Review of Systems      Objective:      /82 (BP Location: Left arm, Patient Position: Sitting, Cuff Size: Standard)   Pulse 80   Temp 98.2 °F (36.8 °C) (Temporal)   Resp 17   Ht 6' (1.829 m)   Wt 114 kg (252 lb)   SpO2 96%   BMI 34.18 kg/m²          Physical Exam    A&OX3  Gaze conjugate  EOMI  FS  TM  Fcx4  5/5 BUE  5/5 BLE  SILT  No clonus  No hogan  No babinski

## 2024-02-12 ENCOUNTER — TELEPHONE (OUTPATIENT)
Dept: RADIOLOGY | Facility: MEDICAL CENTER | Age: 49
End: 2024-02-12

## 2024-02-12 NOTE — TELEPHONE ENCOUNTER
1st attempt  Lm to cb with % improvement and pain level.        LT L2 and L3 TFESI 2/5, F/U 2/20

## 2024-02-20 ENCOUNTER — APPOINTMENT (OUTPATIENT)
Dept: LAB | Facility: HOSPITAL | Age: 49
End: 2024-02-20
Payer: COMMERCIAL

## 2024-02-20 DIAGNOSIS — M51.26 LUMBAR DISC HERNIATION: ICD-10-CM

## 2024-02-20 DIAGNOSIS — Z01.818 PRE-PROCEDURAL EXAMINATION: ICD-10-CM

## 2024-02-20 LAB
ALBUMIN SERPL BCP-MCNC: 4.2 G/DL (ref 3.5–5)
ALP SERPL-CCNC: 45 U/L (ref 34–104)
ALT SERPL W P-5'-P-CCNC: 24 U/L (ref 7–52)
ANION GAP SERPL CALCULATED.3IONS-SCNC: 7 MMOL/L
APTT PPP: 29 SECONDS (ref 23–37)
AST SERPL W P-5'-P-CCNC: 13 U/L (ref 13–39)
ATRIAL RATE: 74 BPM
BASOPHILS # BLD AUTO: 0.09 THOUSANDS/ÂΜL (ref 0–0.1)
BASOPHILS NFR BLD AUTO: 1 % (ref 0–1)
BILIRUB SERPL-MCNC: 0.47 MG/DL (ref 0.2–1)
BILIRUB UR QL STRIP: NEGATIVE
BUN SERPL-MCNC: 17 MG/DL (ref 5–25)
CALCIUM SERPL-MCNC: 9.7 MG/DL (ref 8.4–10.2)
CHLORIDE SERPL-SCNC: 106 MMOL/L (ref 96–108)
CLARITY UR: CLEAR
CO2 SERPL-SCNC: 25 MMOL/L (ref 21–32)
COLOR UR: YELLOW
CREAT SERPL-MCNC: 0.94 MG/DL (ref 0.6–1.3)
EOSINOPHIL # BLD AUTO: 0.43 THOUSAND/ÂΜL (ref 0–0.61)
EOSINOPHIL NFR BLD AUTO: 4 % (ref 0–6)
ERYTHROCYTE [DISTWIDTH] IN BLOOD BY AUTOMATED COUNT: 12.7 % (ref 11.6–15.1)
EST. AVERAGE GLUCOSE BLD GHB EST-MCNC: 131 MG/DL
GFR SERPL CREATININE-BSD FRML MDRD: 94 ML/MIN/1.73SQ M
GLUCOSE P FAST SERPL-MCNC: 129 MG/DL (ref 65–99)
GLUCOSE UR STRIP-MCNC: NEGATIVE MG/DL
HBA1C MFR BLD: 6.2 %
HCT VFR BLD AUTO: 45 % (ref 36.5–49.3)
HGB BLD-MCNC: 15.3 G/DL (ref 12–17)
HGB UR QL STRIP.AUTO: NEGATIVE
IMM GRANULOCYTES # BLD AUTO: 0.04 THOUSAND/UL (ref 0–0.2)
IMM GRANULOCYTES NFR BLD AUTO: 0 % (ref 0–2)
INR PPP: 1.04 (ref 0.84–1.19)
KETONES UR STRIP-MCNC: NEGATIVE MG/DL
LEUKOCYTE ESTERASE UR QL STRIP: NEGATIVE
LYMPHOCYTES # BLD AUTO: 2.47 THOUSANDS/ÂΜL (ref 0.6–4.47)
LYMPHOCYTES NFR BLD AUTO: 23 % (ref 14–44)
MCH RBC QN AUTO: 31.4 PG (ref 26.8–34.3)
MCHC RBC AUTO-ENTMCNC: 34 G/DL (ref 31.4–37.4)
MCV RBC AUTO: 92 FL (ref 82–98)
MONOCYTES # BLD AUTO: 0.76 THOUSAND/ÂΜL (ref 0.17–1.22)
MONOCYTES NFR BLD AUTO: 7 % (ref 4–12)
NEUTROPHILS # BLD AUTO: 7.03 THOUSANDS/ÂΜL (ref 1.85–7.62)
NEUTS SEG NFR BLD AUTO: 65 % (ref 43–75)
NITRITE UR QL STRIP: NEGATIVE
NRBC BLD AUTO-RTO: 0 /100 WBCS
P AXIS: 27 DEGREES
PH UR STRIP.AUTO: 5.5 [PH]
PLATELET # BLD AUTO: 243 THOUSANDS/UL (ref 149–390)
PMV BLD AUTO: 9.7 FL (ref 8.9–12.7)
POTASSIUM SERPL-SCNC: 4.2 MMOL/L (ref 3.5–5.3)
PR INTERVAL: 320 MS
PROT SERPL-MCNC: 7.3 G/DL (ref 6.4–8.4)
PROT UR STRIP-MCNC: NEGATIVE MG/DL
PROTHROMBIN TIME: 14 SECONDS (ref 11.6–14.5)
QRS AXIS: 23 DEGREES
QRSD INTERVAL: 118 MS
QT INTERVAL: 374 MS
QTC INTERVAL: 415 MS
RBC # BLD AUTO: 4.88 MILLION/UL (ref 3.88–5.62)
SODIUM SERPL-SCNC: 138 MMOL/L (ref 135–147)
SP GR UR STRIP.AUTO: 1.02 (ref 1–1.03)
T WAVE AXIS: 23 DEGREES
UROBILINOGEN UR STRIP-ACNC: <2 MG/DL
VENTRICULAR RATE: 74 BPM
WBC # BLD AUTO: 10.82 THOUSAND/UL (ref 4.31–10.16)

## 2024-02-20 PROCEDURE — 81003 URINALYSIS AUTO W/O SCOPE: CPT

## 2024-02-20 PROCEDURE — 80053 COMPREHEN METABOLIC PANEL: CPT

## 2024-02-20 PROCEDURE — 85610 PROTHROMBIN TIME: CPT

## 2024-02-20 PROCEDURE — 85730 THROMBOPLASTIN TIME PARTIAL: CPT

## 2024-02-20 PROCEDURE — 85025 COMPLETE CBC W/AUTO DIFF WBC: CPT

## 2024-02-20 PROCEDURE — 83036 HEMOGLOBIN GLYCOSYLATED A1C: CPT

## 2024-02-20 PROCEDURE — 36415 COLL VENOUS BLD VENIPUNCTURE: CPT

## 2024-03-11 RX ORDER — PHENOL 1.4 %
20 AEROSOL, SPRAY (ML) MUCOUS MEMBRANE AS NEEDED
COMMUNITY

## 2024-03-11 RX ORDER — BUPROPION HYDROCHLORIDE 150 MG/1
150 TABLET, EXTENDED RELEASE ORAL 2 TIMES DAILY
COMMUNITY

## 2024-03-11 NOTE — PRE-PROCEDURE INSTRUCTIONS
Pre-Surgery Instructions:   Medication Instructions    acetaminophen (TYLENOL) 500 mg tablet Uses PRN- OK to take day of surgery    buPROPion (Wellbutrin SR) 150 mg 12 hr tablet Take day of surgery.    Melatonin 10 MG TABS Take day of surgery. If needed   Medication instructions for day surgery reviewed. Please use only a sip of water to take your instructed medications. Avoid all over the counter vitamins, supplements and NSAIDS for one week prior to surgery per anesthesia guidelines. Tylenol is ok to take as needed.     You will receive a call one business day prior to surgery with an arrival time and hospital directions. If your surgery is scheduled on a Monday, the hospital will be calling you on the Friday prior to your surgery. If you have not heard from anyone by 8pm, please call the hospital supervisor through the hospital  at [phone]. ([place] [phone] or [facility] [phone]).    Do not eat or drink anything after midnight the night before your surgery, including candy, mints, lifesavers, or chewing gum. Do not drink alcohol 24hrs before your surgery. Try not to smoke at least 24hrs before your surgery.       Follow the pre surgery showering instructions as listed in the “My Surgical Experience Booklet” or otherwise provided by your surgeon's office. Do not use a blade to shave the surgical area 1 week before surgery. It is okay to use a clean electric clippers up to 24 hours before surgery. Do not apply any lotions, creams, including makeup, cologne, deodorant, or perfumes after showering on the day of your surgery. Do not use dry shampoo, hair spray, hair gel, or any type of hair products.     No contact lenses, eye make-up, or artificial eyelashes. Remove nail polish, including gel polish, and any artificial, gel, or acrylic nails if possible. Remove all jewelry including rings and body piercing jewelry.     Wear causal clothing that is easy to take on and off. Consider your type of  surgery.    Keep any valuables, jewelry, piercings at home. Please bring any specially ordered equipment (sling, braces) if indicated.    Arrange for a responsible person to drive you to and from the hospital on the day of your surgery. Please confirm the visitor policy for the day of your procedure when you receive your phone call with an arrival time.     Call the surgeon's office with any new illnesses, exposures, or additional questions prior to surgery.    Please reference your “My Surgical Experience Booklet” for additional information to prepare for your upcoming surgery.

## 2024-03-12 ENCOUNTER — HOSPITAL ENCOUNTER (OUTPATIENT)
Facility: HOSPITAL | Age: 49
Setting detail: OUTPATIENT SURGERY
Discharge: HOME | End: 2024-03-12
Attending: STUDENT IN AN ORGANIZED HEALTH CARE EDUCATION/TRAINING PROGRAM | Admitting: STUDENT IN AN ORGANIZED HEALTH CARE EDUCATION/TRAINING PROGRAM
Payer: COMMERCIAL

## 2024-03-12 ENCOUNTER — DOCUMENTATION (OUTPATIENT)
Dept: NEUROSURGERY | Facility: CLINIC | Age: 49
End: 2024-03-12

## 2024-03-12 ENCOUNTER — ANESTHESIA (OUTPATIENT)
Dept: PERIOP | Facility: HOSPITAL | Age: 49
End: 2024-03-12
Payer: COMMERCIAL

## 2024-03-12 ENCOUNTER — ANESTHESIA EVENT (OUTPATIENT)
Dept: PERIOP | Facility: HOSPITAL | Age: 49
End: 2024-03-12
Payer: COMMERCIAL

## 2024-03-12 ENCOUNTER — APPOINTMENT (OUTPATIENT)
Dept: RADIOLOGY | Facility: HOSPITAL | Age: 49
End: 2024-03-12
Payer: COMMERCIAL

## 2024-03-12 VITALS
WEIGHT: 252.4 LBS | RESPIRATION RATE: 14 BRPM | OXYGEN SATURATION: 93 % | DIASTOLIC BLOOD PRESSURE: 69 MMHG | HEIGHT: 72 IN | HEART RATE: 82 BPM | BODY MASS INDEX: 34.19 KG/M2 | SYSTOLIC BLOOD PRESSURE: 140 MMHG | TEMPERATURE: 98.5 F

## 2024-03-12 DIAGNOSIS — M54.16 LUMBAR RADICULOPATHY: Primary | ICD-10-CM

## 2024-03-12 PROCEDURE — 99024 POSTOP FOLLOW-UP VISIT: CPT | Performed by: STUDENT IN AN ORGANIZED HEALTH CARE EDUCATION/TRAINING PROGRAM

## 2024-03-12 PROCEDURE — 63030 LAMOT DCMPRN NRV RT 1 LMBR: CPT | Performed by: STUDENT IN AN ORGANIZED HEALTH CARE EDUCATION/TRAINING PROGRAM

## 2024-03-12 PROCEDURE — 72100 X-RAY EXAM L-S SPINE 2/3 VWS: CPT

## 2024-03-12 RX ORDER — HYDROMORPHONE HCL/PF 1 MG/ML
SYRINGE (ML) INJECTION AS NEEDED
Status: DISCONTINUED | OUTPATIENT
Start: 2024-03-12 | End: 2024-03-12

## 2024-03-12 RX ORDER — ONDANSETRON 2 MG/ML
4 INJECTION INTRAMUSCULAR; INTRAVENOUS ONCE AS NEEDED
Status: DISCONTINUED | OUTPATIENT
Start: 2024-03-12 | End: 2024-03-12 | Stop reason: HOSPADM

## 2024-03-12 RX ORDER — OXYCODONE HYDROCHLORIDE 5 MG/1
5 TABLET ORAL EVERY 4 HOURS PRN
Status: DISCONTINUED | OUTPATIENT
Start: 2024-03-12 | End: 2024-03-12 | Stop reason: HOSPADM

## 2024-03-12 RX ORDER — DEXAMETHASONE SODIUM PHOSPHATE 10 MG/ML
INJECTION, SOLUTION INTRAMUSCULAR; INTRAVENOUS AS NEEDED
Status: DISCONTINUED | OUTPATIENT
Start: 2024-03-12 | End: 2024-03-12

## 2024-03-12 RX ORDER — CEFAZOLIN SODIUM 1 G/3ML
INJECTION, POWDER, FOR SOLUTION INTRAMUSCULAR; INTRAVENOUS AS NEEDED
Status: DISCONTINUED | OUTPATIENT
Start: 2024-03-12 | End: 2024-03-12

## 2024-03-12 RX ORDER — LIDOCAINE HYDROCHLORIDE AND EPINEPHRINE 10; 10 MG/ML; UG/ML
INJECTION, SOLUTION INFILTRATION; PERINEURAL AS NEEDED
Status: DISCONTINUED | OUTPATIENT
Start: 2024-03-12 | End: 2024-03-12 | Stop reason: HOSPADM

## 2024-03-12 RX ORDER — CEFAZOLIN SODIUM 2 G/50ML
2000 SOLUTION INTRAVENOUS ONCE
Status: DISCONTINUED | OUTPATIENT
Start: 2024-03-12 | End: 2024-03-12 | Stop reason: HOSPADM

## 2024-03-12 RX ORDER — SODIUM CHLORIDE 9 MG/ML
INJECTION, SOLUTION INTRAVENOUS CONTINUOUS PRN
Status: DISCONTINUED | OUTPATIENT
Start: 2024-03-12 | End: 2024-03-12

## 2024-03-12 RX ORDER — PROPOFOL 10 MG/ML
INJECTION, EMULSION INTRAVENOUS AS NEEDED
Status: DISCONTINUED | OUTPATIENT
Start: 2024-03-12 | End: 2024-03-12

## 2024-03-12 RX ORDER — METOCLOPRAMIDE HYDROCHLORIDE 5 MG/ML
10 INJECTION INTRAMUSCULAR; INTRAVENOUS ONCE AS NEEDED
Status: DISCONTINUED | OUTPATIENT
Start: 2024-03-12 | End: 2024-03-12 | Stop reason: HOSPADM

## 2024-03-12 RX ORDER — MIDAZOLAM HYDROCHLORIDE 2 MG/2ML
INJECTION, SOLUTION INTRAMUSCULAR; INTRAVENOUS AS NEEDED
Status: DISCONTINUED | OUTPATIENT
Start: 2024-03-12 | End: 2024-03-12

## 2024-03-12 RX ORDER — LIDOCAINE HYDROCHLORIDE 10 MG/ML
INJECTION, SOLUTION EPIDURAL; INFILTRATION; INTRACAUDAL; PERINEURAL AS NEEDED
Status: DISCONTINUED | OUTPATIENT
Start: 2024-03-12 | End: 2024-03-12

## 2024-03-12 RX ORDER — ROCURONIUM BROMIDE 10 MG/ML
INJECTION, SOLUTION INTRAVENOUS AS NEEDED
Status: DISCONTINUED | OUTPATIENT
Start: 2024-03-12 | End: 2024-03-12

## 2024-03-12 RX ORDER — HYDROMORPHONE HCL/PF 1 MG/ML
0.5 SYRINGE (ML) INJECTION
Status: DISCONTINUED | OUTPATIENT
Start: 2024-03-12 | End: 2024-03-12 | Stop reason: HOSPADM

## 2024-03-12 RX ORDER — SUCCINYLCHOLINE/SOD CL,ISO/PF 100 MG/5ML
SYRINGE (ML) INTRAVENOUS AS NEEDED
Status: DISCONTINUED | OUTPATIENT
Start: 2024-03-12 | End: 2024-03-12

## 2024-03-12 RX ORDER — FENTANYL CITRATE/PF 50 MCG/ML
50 SYRINGE (ML) INJECTION
Status: DISCONTINUED | OUTPATIENT
Start: 2024-03-12 | End: 2024-03-12 | Stop reason: HOSPADM

## 2024-03-12 RX ORDER — FENTANYL CITRATE 50 UG/ML
INJECTION, SOLUTION INTRAMUSCULAR; INTRAVENOUS AS NEEDED
Status: DISCONTINUED | OUTPATIENT
Start: 2024-03-12 | End: 2024-03-12

## 2024-03-12 RX ORDER — KETAMINE HCL IN NACL, ISO-OSM 100MG/10ML
SYRINGE (ML) INJECTION AS NEEDED
Status: DISCONTINUED | OUTPATIENT
Start: 2024-03-12 | End: 2024-03-12

## 2024-03-12 RX ORDER — SODIUM CHLORIDE, SODIUM LACTATE, POTASSIUM CHLORIDE, CALCIUM CHLORIDE 600; 310; 30; 20 MG/100ML; MG/100ML; MG/100ML; MG/100ML
INJECTION, SOLUTION INTRAVENOUS CONTINUOUS PRN
Status: DISCONTINUED | OUTPATIENT
Start: 2024-03-12 | End: 2024-03-12

## 2024-03-12 RX ORDER — OXYCODONE HYDROCHLORIDE 5 MG/1
5 TABLET ORAL EVERY 4 HOURS PRN
Qty: 15 TABLET | Refills: 0 | Status: SHIPPED | OUTPATIENT
Start: 2024-03-12 | End: 2024-03-22

## 2024-03-12 RX ORDER — ONDANSETRON 2 MG/ML
INJECTION INTRAMUSCULAR; INTRAVENOUS AS NEEDED
Status: DISCONTINUED | OUTPATIENT
Start: 2024-03-12 | End: 2024-03-12

## 2024-03-12 RX ORDER — CHLORHEXIDINE GLUCONATE ORAL RINSE 1.2 MG/ML
15 SOLUTION DENTAL ONCE
Status: COMPLETED | OUTPATIENT
Start: 2024-03-12 | End: 2024-03-12

## 2024-03-12 RX ORDER — METHYLPREDNISOLONE ACETATE 40 MG/ML
INJECTION, SUSPENSION INTRA-ARTICULAR; INTRALESIONAL; INTRAMUSCULAR; SOFT TISSUE AS NEEDED
Status: DISCONTINUED | OUTPATIENT
Start: 2024-03-12 | End: 2024-03-12 | Stop reason: HOSPADM

## 2024-03-12 RX ADMIN — SODIUM CHLORIDE, SODIUM LACTATE, POTASSIUM CHLORIDE, AND CALCIUM CHLORIDE: .6; .31; .03; .02 INJECTION, SOLUTION INTRAVENOUS at 12:06

## 2024-03-12 RX ADMIN — ROCURONIUM BROMIDE 40 MG: 10 INJECTION, SOLUTION INTRAVENOUS at 12:51

## 2024-03-12 RX ADMIN — DEXAMETHASONE SODIUM PHOSPHATE 10 MG: 10 INJECTION, SOLUTION INTRAMUSCULAR; INTRAVENOUS at 12:51

## 2024-03-12 RX ADMIN — ONDANSETRON 4 MG: 2 INJECTION INTRAMUSCULAR; INTRAVENOUS at 12:34

## 2024-03-12 RX ADMIN — SODIUM CHLORIDE: 0.9 INJECTION, SOLUTION INTRAVENOUS at 12:06

## 2024-03-12 RX ADMIN — PHENYLEPHRINE HYDROCHLORIDE 20 MCG/MIN: 10 INJECTION, SOLUTION INTRAVENOUS at 13:29

## 2024-03-12 RX ADMIN — CEFAZOLIN 2000 MG: 1 INJECTION, POWDER, FOR SOLUTION INTRAMUSCULAR; INTRAVENOUS at 13:00

## 2024-03-12 RX ADMIN — SODIUM CHLORIDE, SODIUM LACTATE, POTASSIUM CHLORIDE, AND CALCIUM CHLORIDE: .6; .31; .03; .02 INJECTION, SOLUTION INTRAVENOUS at 12:05

## 2024-03-12 RX ADMIN — MIDAZOLAM 2 MG: 1 INJECTION INTRAMUSCULAR; INTRAVENOUS at 12:28

## 2024-03-12 RX ADMIN — Medication 10 MG: at 13:13

## 2024-03-12 RX ADMIN — PROPOFOL 200 MG: 10 INJECTION, EMULSION INTRAVENOUS at 12:35

## 2024-03-12 RX ADMIN — FENTANYL CITRATE 50 MCG: 50 INJECTION, SOLUTION INTRAMUSCULAR; INTRAVENOUS at 13:13

## 2024-03-12 RX ADMIN — SUGAMMADEX 400 MG: 100 INJECTION, SOLUTION INTRAVENOUS at 15:04

## 2024-03-12 RX ADMIN — LIDOCAINE HYDROCHLORIDE 50 MG: 10 INJECTION, SOLUTION EPIDURAL; INFILTRATION; INTRACAUDAL; PERINEURAL at 12:35

## 2024-03-12 RX ADMIN — ROCURONIUM BROMIDE 10 MG: 10 INJECTION, SOLUTION INTRAVENOUS at 12:35

## 2024-03-12 RX ADMIN — ROCURONIUM BROMIDE 20 MG: 10 INJECTION, SOLUTION INTRAVENOUS at 13:53

## 2024-03-12 RX ADMIN — HYDROMORPHONE HYDROCHLORIDE 0.5 MG: 1 INJECTION, SOLUTION INTRAMUSCULAR; INTRAVENOUS; SUBCUTANEOUS at 13:55

## 2024-03-12 RX ADMIN — CHLORHEXIDINE GLUCONATE 15 ML: 1.2 SOLUTION ORAL at 10:44

## 2024-03-12 RX ADMIN — Medication 30 MG: at 12:35

## 2024-03-12 RX ADMIN — Medication 10 MG: at 12:51

## 2024-03-12 RX ADMIN — FENTANYL CITRATE 50 MCG: 50 INJECTION, SOLUTION INTRAMUSCULAR; INTRAVENOUS at 12:35

## 2024-03-12 RX ADMIN — ROCURONIUM BROMIDE 30 MG: 10 INJECTION, SOLUTION INTRAVENOUS at 13:26

## 2024-03-12 RX ADMIN — Medication 100 MG: at 12:36

## 2024-03-12 NOTE — H&P
H&P reviewed. After examining the patient I find no changes in the patients condition since the H&P had been written.    Patient personally seen and examined.  Neurological examination unchanged compared to last office/progress note, with the following exceptions:    /70   Pulse 71   Temp (!) 97.3 °F (36.3 °C) (Temporal)   Resp 16   Ht 6' (1.829 m)   Wt 114 kg (252 lb 6.4 oz)   SpO2 97%   BMI 34.23 kg/m²      A&OX3  Gaze conjugate  EOMI  FS  TM  Fcx4  5/5 BUE  5/5 BLE  SILT.    Regular cardiac rate and rhythm.  No respiratory distress.  Abdomen nontender.  Normocephalic.    Post operative instructions and medications have been reviewed with the patient and family.    Assessment and Plan:    All questions have been answered to the patient satisfaction.  Plan to proceed with Left L2-3 MIS microdiscectomy. They are in agreement with proceeding.

## 2024-03-12 NOTE — OP NOTE
OPERATIVE REPORT  PATIENT NAME: Tony Mtz    :  1975  MRN: [id number]  Pt Location:  OR ROOM 03    SURGERY DATE: 3/12/2024    Surgeons and Role:     * Adan Moore MD - Primary    Preop Diagnosis:  Lumbar disc herniation [M51.26]    Post-Op Diagnosis Codes:     * Lumbar disc herniation [M51.26]    Procedure(s):  Left L2-3 MIS microdiscectomy  Use of intraoperative fluoro  Use of intraoperative microscope    Specimen(s):  * No specimens in log *    Estimated Blood Loss:   Minimal    Drains:  * No LDAs found *    Anesthesia Type:   General    Operative Indications:  Lumbar disc herniation [M51.26]    Complications:   None    Procedure and Technique:  Patient was brought back to main operating room and general endotracheal anesthesia was induced.  Appropriate lines were placed.  Preoperative antibiotics given were Ancef.  He is placed prone on the operating room table and all pressure points were appropriately padded.  The lumbar area was prepped and draped in usual sterile fashion and a timeout was performed.  Intraoperative fluoroscopy was used to confirm the L2-3 level.  A linear incision approximately 1 cm lateral to the left of midline was made using 10 blade scalpel.  Monopolar was used to dissect down to the fascia and the fascia was divided using monopolar electrocautery.  Finger dissection was used to bluntly dissect the paraspinal muscles away from the L2-3 lamina on the left side.  The minimally invasive tube dilators were inserted over the L2-3 level on the left side and secured to the table.  Intraoperative fluoroscopy was used to again confirm the correct level.  The intraoperative microscope was brought to the field for dissection.  The laminotomy was completed using an M8 drill bit and then upgoing curettes and blunt nerve hooks were used to dissect the underlying ligamentum flavum away from the dura.  Ligamentum flavum was resected using Kerrison rongeurs.  The thecal sac and the  traversing L3 nerve root were retracted medially.  Bipolar electrocautery was used to coagulate the epidural veins.  The annulotomy was made using an 11 blade.  Pituitary rongeurs were used to remove the herniated disc fragment.  I then used a blunt nerve hook to further dissect down more of the disc fragment.  Once a sufficient amount of the herniated disc was removed, I used a blunt nerve hook to palpate the traversing L3 nerve root as well as the thecal sac anteriorly and centrally.  The thecal sac and the traversing L3 nerve root appeared to be well decompressed.  Hemostasis was achieved using Floseal and thrombin-soaked cotton patties.  The wound was irrigated with large amount sterile saline.  Fascia was closed with 0 Vicryl sutures.  Dermis was closed with 2-0 Vicryl sutures.  Skin was closed with Monocryl.  After procedure was done a counts performed and all sponge instrument needle counts verified to be correct.  Patient was transported to the PACU in stable condition.     I was present for the entire procedure.    Patient Disposition:  PACU         SIGNATURE: Adan Moore MD  DATE: March 12, 2024  TIME: 12:07 PM

## 2024-03-12 NOTE — ANESTHESIA PREPROCEDURE EVALUATION
Procedure:  LEFT L2-3 MIS MICRODISCECTOMY (Left: Spine Lumbar)    Relevant Problems   PULMONARY   (+) Obstructive sleep apnea   (+) Smoking        Physical Exam    Airway    Mallampati score: II  TM Distance: >3 FB  Neck ROM: full     Dental       Cardiovascular      Pulmonary      Other Findings        Anesthesia Plan  ASA Score- 2     Anesthesia Type- general with ASA Monitors.         Additional Monitors:     Airway Plan: ETT.           Plan Factors-    Chart reviewed.                      Induction- intravenous.    Postoperative Plan-     Informed Consent- Anesthetic plan and risks discussed with patient.  I personally reviewed this patient with the CRNA. Discussed and agreed on the Anesthesia Plan with the CRNA..

## 2024-03-12 NOTE — ANESTHESIA POSTPROCEDURE EVALUATION
Post-Op Assessment Note    CV Status:  Stable    Pain management: adequate       Mental Status:  Alert and awake   Hydration Status:  Euvolemic   PONV Controlled:  Controlled   Airway Patency:  Patent     Post Op Vitals Reviewed: Yes    No anethesia notable event occurred.    Staff: Anesthesiologist, CRNA               BP   133/62   Temp      Pulse  90   Resp   12   SpO2   96

## 2024-03-12 NOTE — PROGRESS NOTES
[place] PDMP including surrounding states were searched for patient that is scheduled for surgery today.  No current record exists.

## 2024-03-14 ENCOUNTER — TELEPHONE (OUTPATIENT)
Dept: NEUROSURGERY | Facility: CLINIC | Age: 49
End: 2024-03-14

## 2024-03-14 NOTE — TELEPHONE ENCOUNTER
Called patient to see how he is doing after surgery. Patient reports he is doing well overall and denies any incisional issues or fevers. Patient is able to ambulate around the house and complete ADLs. Educated the patient about the importance of preventing blood clots and provided measures how to prevent them.     Patient has moved his bowels since the surgery. Encouraged patient to take an over the counter stool softener, if he is taking narcotic pain medication. Encouraged fiber intake and fluids.    Reviewed incision care with the patient. Advised that after three days he may take a shower and gently wash the surgical site with soap and water. Use clean wash cloth, towels, and clothing. Do not submerge in water until cleared by the surgeon. Do not apply any creams, ointments, or lotions to the site.  Patient states that he has a rash that popped up several spots on his legs and chest starting yesterday.  He stated that it itches a little and the only medications he has taken are Wellbutrin and Benadryl since his surgery. Benadryl provided relief for the itching. He has not yet showered.  He was encouraged to cleans skin with clean washcloth and to reach out to PCP or our office if it were to get worse or with any additional symptoms  Patient is aware to call the office if any redness, swelling, drainage, dehiscence of incision, or fever >100 F occurs.    Patient is aware to call the office if any concerns or questions may arise. Reminded patient of his upcoming appointments with the date/time/location. Patient was appreciative for the call.

## 2024-03-27 ENCOUNTER — OFFICE VISIT (OUTPATIENT)
Dept: NEUROSURGERY | Facility: CLINIC | Age: 49
End: 2024-03-27

## 2024-03-27 VITALS
OXYGEN SATURATION: 98 % | TEMPERATURE: 98.5 F | DIASTOLIC BLOOD PRESSURE: 88 MMHG | HEIGHT: 72 IN | HEART RATE: 70 BPM | WEIGHT: 252 LBS | BODY MASS INDEX: 34.13 KG/M2 | SYSTOLIC BLOOD PRESSURE: 150 MMHG | RESPIRATION RATE: 17 BRPM

## 2024-03-27 DIAGNOSIS — M54.16 LUMBAR RADICULOPATHY: Primary | ICD-10-CM

## 2024-03-27 PROCEDURE — 99024 POSTOP FOLLOW-UP VISIT: CPT | Performed by: STUDENT IN AN ORGANIZED HEALTH CARE EDUCATION/TRAINING PROGRAM

## 2024-03-27 RX ORDER — METHYLPREDNISOLONE 4 MG/1
TABLET ORAL
Qty: 1 EACH | Refills: 0 | Status: SHIPPED | OUTPATIENT
Start: 2024-03-27

## 2024-03-27 NOTE — PROGRESS NOTES
Assessment/Plan:  48-year-old male with a history of left-sided L3 radiculopathy and a left-sided L2-3 herniated disc who is status post left L2-3 microdiscectomy on 3/12/2024.  He presents today for his 2-week postop visit.  Overall he is doing well from surgery.  Incisions clean dry and intact.  His left leg pain is dramatically improved.  Return to clinic in 4 weeks for his 6-week postop visit.  In regards to the rash that he developed after surgery, I stated that he may have an allergy to cephalosporins as well however if he were to develop a rash from the Ancef I expect him to develop it within a couple hours after it being injected and the rash did not develop until approximately 24 hours after surgery.  I offered him a referral to an allergist to be tested however he stated that he was not concerned and he would decline at this time.  If it happens again in the future then he may reconsider.      Subjective:      Patient ID: Tony Mtz is a 48 y.o. male.    HPI    48-year-old male with a history of left-sided L3 radiculopathy and a left-sided L2-3 herniated disc who is status post left L2-3 microdiscectomy on 3/12/2024.  He presents today for his 2-week postop visit.  Overall he is doing well from surgery.  His incisions clean dry and intact.  His left leg pain has dramatically improved.  He states that he will have days where he is pain-free.  He does occasionally get return of his pain on some days but it is very mild compared to preoperatively and only lasts for a couple hours and then goes away.  He no longer walks with a limp.  He states that the next day after surgery he broke out in a full body rash but the rash has improved since.  He states that he broke out in a rash after his shoulder surgery several years ago and was told he might have a penicillin allergy.  For the lumbar microdisc surgery he was given Ancef.    The following portions of the patient's history were reviewed and updated as  appropriate: allergies, current medications, past family history, past medical history, past social history, past surgical history, and problem list.    Review of Systems      Objective:      /88 (BP Location: Right arm, Patient Position: Sitting)   Pulse 70   Temp 98.5 °F (36.9 °C) (Temporal)   Resp 17   Ht 6' (1.829 m)   Wt 114 kg (252 lb)   SpO2 98%   BMI 34.18 kg/m²          Physical Exam    A&OX3  Gaze conjugate  EOMI  FS  TM  Fcx4  5/5 BUE  5/5 BLE  SILT  No clonus  No hogan  No babinski  Lumbar incision clean dry and intact.

## 2024-03-28 ENCOUNTER — TELEPHONE (OUTPATIENT)
Dept: PODIATRY | Facility: CLINIC | Age: 49
End: 2024-03-28

## 2024-04-08 ENCOUNTER — TELEPHONE (OUTPATIENT)
Dept: NEUROSURGERY | Facility: CLINIC | Age: 49
End: 2024-04-08

## 2024-04-08 NOTE — TELEPHONE ENCOUNTER
Received a call from patient requesting a letter to return to work to his part time job.     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 He stated he will need a letter to return to this.   Advised this RN will confirm with Dr Moore prior to providing the letter since he is still within the 6 weeks postop timeframe.   Patient is aware of his activity restrictions.    He stated an understanding and was appreciative of the call back.

## 2024-04-11 NOTE — TELEPHONE ENCOUNTER
Patient phoned the nurse line to inquire about the letter he requested.  Patient updated that we are still pending response from his surgeon as as soon as we hear we will call him to let him know the letter was placed in my chart.  Patient appreciative for conversation.

## 2024-04-22 ENCOUNTER — OFFICE VISIT (OUTPATIENT)
Dept: NEUROSURGERY | Facility: CLINIC | Age: 49
End: 2024-04-22

## 2024-04-22 VITALS
RESPIRATION RATE: 16 BRPM | BODY MASS INDEX: 34.13 KG/M2 | SYSTOLIC BLOOD PRESSURE: 142 MMHG | HEIGHT: 72 IN | TEMPERATURE: 98.1 F | HEART RATE: 74 BPM | DIASTOLIC BLOOD PRESSURE: 78 MMHG | WEIGHT: 252 LBS | OXYGEN SATURATION: 98 %

## 2024-04-22 DIAGNOSIS — M54.16 LUMBAR RADICULOPATHY: Primary | ICD-10-CM

## 2024-04-22 PROCEDURE — 99024 POSTOP FOLLOW-UP VISIT: CPT | Performed by: STUDENT IN AN ORGANIZED HEALTH CARE EDUCATION/TRAINING PROGRAM

## 2024-04-22 NOTE — PROGRESS NOTES
Assessment/Plan:  48-year-old male with a history of left-sided L3 radiculopathy and a left-sided L2-3 herniated disc who is status post left L2-3 microdiscectomy on 3/12/2024.  He presents today for 6-week postop visit.  He is doing well.  He is cleared for full activity.  He is already returned back to work.  Next return visit is in 6 weeks for 3-month postop visit.      Subjective:      Patient ID: Tony Mtz is a 48 y.o. male.    HPI    48-year-old male with a history of left-sided L3 radiculopathy and a left-sided L2-3 herniated disc who is status post left L2-3 microdiscectomy on 3/12/2024.  He presents today for 6-week postop visit.  He is doing well.  No leg pain.  Incisions clean dry and intact.  The rash on his back is improving.  He has went back to work.  Ambulating independently.    The following portions of the patient's history were reviewed and updated as appropriate: allergies, current medications, past family history, past medical history, past social history, past surgical history, and problem list.    Review of Systems      Objective:      /78 (BP Location: Left arm, Patient Position: Sitting)   Pulse 74   Temp 98.1 °F (36.7 °C) (Temporal)   Resp 16   Ht 6' (1.829 m)   Wt 114 kg (252 lb)   SpO2 98%   BMI 34.18 kg/m²          Physical Exam    A&OX3  Gaze conjugate  EOMI  FS  TM  Fcx4  5/5 BUE  5/5 BLE  SILT

## 2024-05-28 ENCOUNTER — TELEPHONE (OUTPATIENT)
Dept: DIABETES SERVICES | Facility: CLINIC | Age: 49
End: 2024-05-28

## 2024-06-05 ENCOUNTER — TELEPHONE (OUTPATIENT)
Age: 49
End: 2024-06-05

## 2024-06-05 ENCOUNTER — TELEMEDICINE (OUTPATIENT)
Age: 49
End: 2024-06-05

## 2024-06-05 DIAGNOSIS — M54.16 LUMBAR RADICULOPATHY: Primary | ICD-10-CM

## 2024-06-05 PROCEDURE — 99024 POSTOP FOLLOW-UP VISIT: CPT | Performed by: STUDENT IN AN ORGANIZED HEALTH CARE EDUCATION/TRAINING PROGRAM

## 2024-06-05 RX ORDER — MELOXICAM 7.5 MG/1
7.5 TABLET ORAL DAILY
Qty: 14 TABLET | Refills: 0 | Status: SHIPPED | OUTPATIENT
Start: 2024-06-05 | End: 2024-06-19

## 2024-06-05 NOTE — TELEPHONE ENCOUNTER
06/05/2024- CALLED PT AND LEFT MESSAGE ON MACHINE TO SCHEDULE A 4 WK F/U AFTER TODAY'S VIRTUAL APT.

## 2024-06-05 NOTE — PROGRESS NOTES
Virtual Regular Visit    Verification of patient location:    Patient is located at Home in the following state in which I hold an active license [place]      Assessment/Plan:  Tony Mtz is a 48 y.o. male with a history of left-sided L3 radiculopathy and a left-sided L2-3 herniated disc who is status post left L2-3 microdiscectomy on 3/12/2024. He presents today for 3 month postop visit.  He has been doing well postoperatively with improvement of his leg pain however approximately 2 to 3 weeks ago he developed a flareup of his left leg pain into his left knee.  He did take a Medrol Dosepak and it helped a bit with the pain but overall his pain still lingers.  His pain is much improved compared to preoperatively however it is becoming more of a nuisance.  Prescribed a short course of Mobic and return visit in 4 weeks.  I stated if his pain continued and did not improve then we will proceed with a repeat MRI lumbar spine as well as a referral to pain management for epidural steroid injection.  I offered a prescription for physical therapy as well however he is unable to go to physical therapy due to work constraints.      Problem List Items Addressed This Visit    None           Reason for visit is   Chief Complaint   Patient presents with    Virtual Regular Visit          Encounter provider Adan Moore MD      Recent Visits  No visits were found meeting these conditions.  Showing recent visits within past 7 days and meeting all other requirements  Today's Visits  Date Type Provider Dept   06/05/24 Telemedicine Adan Moore MD  [facility]   Showing today's visits and meeting all other requirements  Future Appointments  No visits were found meeting these conditions.  Showing future appointments within next 150 days and meeting all other requirements       The patient was identified by name and date of birth. Tony Mtz was informed that this is a telemedicine visit and that the visit is being  "conducted through the Epic Embedded platform. He agrees to proceed..  My office door was closed. No one else was in the room.  He acknowledged consent and understanding of privacy and security of the video platform. The patient has agreed to participate and understands they can discontinue the visit at any time.    Patient is aware this is a billable service.     Subjective  Tony Mtz is a 48 y.o. male with a history of left-sided L3 radiculopathy and a left-sided L2-3 herniated disc who is status post left L2-3 microdiscectomy on 3/12/2024. He presents today for 3 month postop visit. Over the last several weeks he's had return of the left sided leg pain into his left knee. States it may be due to increased activity that he is doing recently.  The pain is approximately 4-5  In severity.  It is not as severe as preoperatively and much improved, however it is becoming more of a nuisance.  He has tried the Medrol Dosepak approximately 2 weeks ago and it helped a little bit with the pain but overall the pain has lingered.      HPI     Past Medical History:   Diagnosis Date    Anesthesia     \"woke up during procedure\"    Anxiety     Depression     Elevated hemoglobin A1c     Obstructive sleep apnea 06/13/2023    has CPAP but cannot use       Past Surgical History:   Procedure Laterality Date    EYE SURGERY      lasik    FOOT SURGERY Bilateral     hammer toe    FOREARM SURGERY Right     HAND SURGERY Left     [place] LAMNOTMY INCL W/DCMPRSN NRV ROOT 1 INTRSPC LUMBR Left 3/12/2024    Procedure: LEFT L2-3 MIS MICRODISCECTOMY;  Surgeon: Adan Moore MD;  Location:  MAIN OR;  Service: Neurosurgery    CA NEUROPLASTY &/TRANSPOS MEDIAN NRV CARPAL TUNNE Left 08/20/2019    Procedure: CARPAL TUNNEL RELEASE;  Surgeon: Rafaela Castillo MD;  Location: [facility];  Service: Orthopedics    CA NEUROPLASTY &/TRANSPOS MEDIAN NRV CARPAL TUNNE Right 12/04/2020    Procedure: RELEASE CARPAL TUNNEL;  Surgeon: Rafaela Castillo MD;  " Location:  MAIN OR;  Service: Orthopedics    [place] SURGICAL ARTHROSCOPY SHOULDER W/ROTATOR CUFF RPR Left 02/27/2023    Procedure: ARTHROSCOPY SHOULDER- left, rotator cuff repair, biceps tenodesis and all necessary procedures;  Surgeon: Estefani Silva;  Location:  MAIN OR;  Service: Orthopedics    SHOULDER SURGERY Right     rotator cuff x 2       Current Outpatient Medications   Medication Sig Dispense Refill    buPROPion (Wellbutrin SR) 150 mg 12 hr tablet Take 150 mg by mouth 2 (two) times a day      Melatonin 10 MG TABS Take 20 mg by mouth if needed (Patient not taking: Reported on 4/22/2024)      methylPREDNISolone 4 MG tablet therapy pack Use as directed on package (Patient not taking: Reported on 4/22/2024) 1 each 0     No current facility-administered medications for this visit.        Allergies   Allergen Reactions    Adhesive [Medical Tape] Hives       Review of Systems    Video Exam    There were no vitals filed for this visit.    Physical Exam   A&OX3  Gaze conjugate  EOMI  FS  TM  Fcx4  5/5 BUE  5/5 BLE  SILT      Visit Time  Total Visit Duration: 10 min

## 2024-06-25 ENCOUNTER — TELEPHONE (OUTPATIENT)
Dept: NEUROSURGERY | Facility: CLINIC | Age: 49
End: 2024-06-25

## 2024-06-25 ENCOUNTER — TELEPHONE (OUTPATIENT)
Age: 49
End: 2024-06-25

## 2024-06-25 DIAGNOSIS — M54.16 LUMBAR RADICULOPATHY: Primary | ICD-10-CM

## 2024-06-25 NOTE — TELEPHONE ENCOUNTER
Received inbox via triage queue, patient requesting MRI prior to visit with Dr. Moore. After review of the chart, per MEKHI last office note, ok to order MRI.

## 2024-07-05 ENCOUNTER — TELEMEDICINE (OUTPATIENT)
Age: 49
End: 2024-07-05
Payer: COMMERCIAL

## 2024-07-05 DIAGNOSIS — M54.16 LUMBAR RADICULOPATHY: Primary | ICD-10-CM

## 2024-07-05 PROCEDURE — 99212 OFFICE O/P EST SF 10 MIN: CPT | Performed by: STUDENT IN AN ORGANIZED HEALTH CARE EDUCATION/TRAINING PROGRAM

## 2024-07-05 RX ORDER — OXYCODONE HYDROCHLORIDE AND ACETAMINOPHEN 5; 325 MG/1; MG/1
1-2 TABLET ORAL EVERY 6 HOURS PRN
Qty: 40 TABLET | Refills: 0 | Status: SHIPPED | OUTPATIENT
Start: 2024-07-05

## 2024-07-05 NOTE — PROGRESS NOTES
Virtual Regular Visit    Verification of patient location:    Patient is located at Home in the following state in which I hold an active license [place]      Assessment/Plan:  Tony Mtz is a 48 y.o. male  with a history of left-sided L3 radiculopathy and a left-sided L2-3 herniated disc who is status post left L2-3 microdiscectomy on 3/12/2024.  He is approximately 3.5 to 4 months postop.  Overall he is doing well postoperatively however approximately 4 to 6 weeks ago he developed a return of his left leg pain radiating to his knee.  He has an MRI lumbar spine scheduled for 1 week from now.  We will schedule a return visit to review the results 1 to 2 days after the MRI is completed.    Problem List Items Addressed This Visit    None           Reason for visit is   Chief Complaint   Patient presents with    Virtual Regular Visit          Encounter provider Adan Moore MD      Recent Visits  No visits were found meeting these conditions.  Showing recent visits within past 7 days and meeting all other requirements  Today's Visits  Date Type Provider Dept   07/05/24 Telemedicine Adan Moore MD  [facility]   Showing today's visits and meeting all other requirements  Future Appointments  No visits were found meeting these conditions.  Showing future appointments within next 150 days and meeting all other requirements       The patient was identified by name and date of birth. Tony Mtz was informed that this is a telemedicine visit and that the visit is being conducted through the Epic Embedded platform. He agrees to proceed..  My office door was closed. No one else was in the room.  He acknowledged consent and understanding of privacy and security of the video platform. The patient has agreed to participate and understands they can discontinue the visit at any time.    Patient is aware this is a billable service.     Subjective  Tony Mtz is a 48 y.o. male  with a history of left-sided  "L3 radiculopathy and a left-sided L2-3 herniated disc who is status post left L2-3 microdiscectomy on 3/12/2024.  He is approximately 3.5 to 4 months postop.  Overall he is doing well postoperatively however approximately 4 to 6 weeks ago he developed a return of his left leg pain radiating to his knee.  He has tried Medrol Dosepak and Mobic and the pain has still lingered.  He is awaiting MRI lumbar spine for further evaluation.      HPI     Past Medical History:   Diagnosis Date    Anesthesia     \"woke up during procedure\"    Anxiety     Depression     Elevated hemoglobin A1c     Obstructive sleep apnea 06/13/2023    has CPAP but cannot use       Past Surgical History:   Procedure Laterality Date    EYE SURGERY      lasik    FOOT SURGERY Bilateral     hammer toe    FOREARM SURGERY Right     HAND SURGERY Left     [place] LAMNOTMY INCL W/DCMPRSN NRV ROOT 1 INTRSPC LUMBR Left 3/12/2024    Procedure: LEFT L2-3 MIS MICRODISCECTOMY;  Surgeon: Adan Moore MD;  Location: [facility];  Service: Neurosurgery    [place] NEUROPLASTY &/TRANSPOS MEDIAN NRV CARPAL TUNNE Left 08/20/2019    Procedure: CARPAL TUNNEL RELEASE;  Surgeon: Rafaela Castillo MD;  [facility];  Service: Orthopedics    [place] NEUROPLASTY &/TRANSPOS MEDIAN NRV CARPAL TUNNE Right 12/04/2020    Procedure: RELEASE CARPAL TUNNEL;  Surgeon: Rafaela Castillo MD;  Location: [facility];  Service: Orthopedics    [place] SURGICAL ARTHROSCOPY SHOULDER W/ROTATOR CUFF RPR Left 02/27/2023    Procedure: ARTHROSCOPY SHOULDER- left, rotator cuff repair, biceps tenodesis and all necessary procedures;  Surgeon: Estefani Silva;  Location: [facility];  Service: Orthopedics    SHOULDER SURGERY Right     rotator cuff x 2       Current Outpatient Medications   Medication Sig Dispense Refill    buPROPion (Wellbutrin SR) 150 mg 12 hr tablet Take 150 mg by mouth 2 (two) times a day      Melatonin 10 MG TABS Take 20 mg by mouth if needed (Patient not taking: Reported on 4/22/2024)      " meloxicam (Mobic) 7.5 mg tablet Take 1 tablet (7.5 mg total) by mouth daily for 14 days 14 tablet 0    methylPREDNISolone 4 MG tablet therapy pack Use as directed on package (Patient not taking: Reported on 4/22/2024) 1 each 0     No current facility-administered medications for this visit.        Allergies   Allergen Reactions    Adhesive [Medical Tape] Hives       Review of Systems    Video Exam    There were no vitals filed for this visit.    Physical Exam     Visit Time  Total Visit Duration: 15 mins

## 2024-07-13 ENCOUNTER — HOSPITAL ENCOUNTER (OUTPATIENT)
Dept: MRI IMAGING | Facility: HOSPITAL | Age: 49
Discharge: HOME | End: 2024-07-13
Attending: STUDENT IN AN ORGANIZED HEALTH CARE EDUCATION/TRAINING PROGRAM
Payer: COMMERCIAL

## 2024-07-13 DIAGNOSIS — M54.16 LUMBAR RADICULOPATHY: ICD-10-CM

## 2024-07-13 PROCEDURE — A9585 GADOBUTROL INJECTION: HCPCS | Performed by: STUDENT IN AN ORGANIZED HEALTH CARE EDUCATION/TRAINING PROGRAM

## 2024-07-13 PROCEDURE — 72158 MRI LUMBAR SPINE W/O & W/DYE: CPT

## 2024-07-13 RX ORDER — GADOBUTROL 604.72 MG/ML
4 INJECTION INTRAVENOUS
Status: COMPLETED | OUTPATIENT
Start: 2024-07-13 | End: 2024-07-13

## 2024-07-13 RX ORDER — GADOBUTROL 604.72 MG/ML
11 INJECTION INTRAVENOUS
Status: DISCONTINUED | OUTPATIENT
Start: 2024-07-13 | End: 2024-07-13

## 2024-07-13 RX ADMIN — GADOBUTROL 4 ML: 604.72 INJECTION INTRAVENOUS at 16:52

## 2024-07-15 ENCOUNTER — TELEMEDICINE (OUTPATIENT)
Dept: NEUROSURGERY | Facility: CLINIC | Age: 49
End: 2024-07-15
Payer: COMMERCIAL

## 2024-07-15 DIAGNOSIS — M54.16 LUMBAR RADICULOPATHY: Primary | ICD-10-CM

## 2024-07-15 PROCEDURE — 99213 OFFICE O/P EST LOW 20 MIN: CPT | Performed by: STUDENT IN AN ORGANIZED HEALTH CARE EDUCATION/TRAINING PROGRAM

## 2024-07-15 RX ORDER — MELOXICAM 7.5 MG/1
7.5 TABLET ORAL DAILY
Qty: 14 TABLET | Refills: 0 | Status: SHIPPED | OUTPATIENT
Start: 2024-07-15

## 2024-07-15 NOTE — PROGRESS NOTES
Virtual Regular Visit  Name: Tony Mtz      : 1975      MRN: [id number]  Encounter Provider: Adan Moore MD  Encounter Date: 7/15/2024   Encounter department: [facility] [place]    Verification of patient location:    Patient is located at Home in the following state in which I hold an active license [place]    Assessment & Plan   Tony Mtz is a 48 y.o. male who presents with a history of left-sided L3 radiculopathy and a left-sided L2-3 herniated disc who is status post left L2-3 microdiscectomy on 3/12/2024.  He is approximately 4 months postop.  He did well postoperatively however approximately 1.5 months ago he has had a return of his left leg pain radiating to his knee.  He presents to clinic to discuss results of MRI and lumbar spine repeat.  I reviewed the imaging with him.  Overall the postop scan shows good decompression of the thecal sac at L2-3.  His traversing nerve roots appear well decompressed compared to preoperative imaging.  I recommended pain management referral for a left L3-4 transforaminal ALAN.  I also ordered him a short course of Mobic.  I also ordered an x-ray left knee as he states the pain is largely just in his left knee and does not really radiate from his back to his knee.  Return to clinic in 2 to 3 weeks after x-ray and ALAN completed to discuss results.      Encounter provider Adan Moore MD    The patient was identified by name and date of birth. Tony Mtz was informed that this is a telemedicine visit and that the visit is being conducted through the Epic Embedded platform. He agrees to proceed..  My office door was closed. No one else was in the room.  He acknowledged consent and understanding of privacy and security of the video platform. The patient has agreed to participate and understands they can discontinue the visit at any time.    Patient is aware this is a billable service.     History of Present Illness     Tony Mtz  is a 48 y.o. male who presents with a history of left-sided L3 radiculopathy and a left-sided L2-3 herniated disc who is status post left L2-3 microdiscectomy on 3/12/2024.  He is approximately 4 months postop.  He did well postoperatively however approximately 1.5 months ago he has had a return of his left leg pain radiating to his knee.  He presents to clinic to discuss results of MRI and lumbar spine repeat.    Review of Systems    Objective     There were no vitals taken for this visit.  Physical Exam  A&OX3  Gaze conjugate  EOMI  FS  TM  Fcx4    Visit Time  Total Visit Duration: 20 mins

## 2024-08-23 NOTE — ANESTHESIA PROCEDURE NOTES
Caller States possible UTI:  Symptoms:   Odor   Frequency increased  Incontinence   Onset:   About a month  Originally treated by [facility]  Has not gotten better  What has been tried:   Antibiotic from [facility], completed      Appointments:  Appointment offers during call:  Requests medication without appt    Date of last appointment:    5/31/2024       Pharmacy confirmed as:  [id number] [place] [id number] [facility] -  [phone] - F [phone]        Request:  Medication   Call pt [phone] or cell   Peripheral Block    Patient location during procedure: holding area  Start time: 2/27/2023 9:55 AM  Reason for block: at surgeon's request and post-op pain management  Staffing  Performed: Anesthesiologist   Anesthesiologist: Karmen Faria MD  Preanesthetic Checklist  Completed: patient identified, IV checked, site marked, risks and benefits discussed, surgical consent, monitors and equipment checked, pre-op evaluation and timeout performed  Peripheral Block  Patient position: supine  Prep: ChloraPrep  Patient monitoring: continuous pulse ox, frequent blood pressure checks, heart rate and cardiac monitor  Block type: interscalene  Laterality: left  Injection technique: single-shot  Procedures: ultrasound guided, Ultrasound guidance required for the procedure to increase accuracy and safety of medication placement and decrease risk of complications    Ultrasound permanent image saved  Needle  Needle type: Stimuplex   Needle gauge: 22 G  Needle length: 2 in  Needle localization: ultrasound guidance  Needle insertion depth: 2 cm  Test dose: negative  Assessment  Injection assessment: incremental injection, local visualized surrounding nerve on ultrasound, negative aspiration for heme and no paresthesia on injection  Paresthesia pain: none  Heart rate change: no  Slow fractionated injection: yes  Post-procedure:  site cleaned  patient tolerated the procedure well with no immediate complications

## 2024-08-26 ENCOUNTER — HOSPITAL ENCOUNTER (OUTPATIENT)
Dept: RADIOLOGY | Facility: HOSPITAL | Age: 49
Discharge: HOME | End: 2024-08-26
Payer: COMMERCIAL

## 2024-08-26 DIAGNOSIS — M54.16 LUMBAR RADICULOPATHY: ICD-10-CM

## 2024-08-26 PROCEDURE — 73562 X-RAY EXAM OF KNEE 3: CPT

## 2024-08-28 ENCOUNTER — HOSPITAL ENCOUNTER (OUTPATIENT)
Dept: RADIOLOGY | Facility: HOSPITAL | Age: 49
Discharge: HOME | End: 2024-08-28
Attending: STUDENT IN AN ORGANIZED HEALTH CARE EDUCATION/TRAINING PROGRAM
Payer: COMMERCIAL

## 2024-08-28 ENCOUNTER — OFFICE VISIT (OUTPATIENT)
Dept: PAIN MEDICINE | Facility: CLINIC | Age: 49
End: 2024-08-28
Payer: COMMERCIAL

## 2024-08-28 ENCOUNTER — TELEMEDICINE (OUTPATIENT)
Age: 49
End: 2024-08-28
Payer: COMMERCIAL

## 2024-08-28 ENCOUNTER — TELEPHONE (OUTPATIENT)
Age: 49
End: 2024-08-28

## 2024-08-28 VITALS
TEMPERATURE: 98.7 F | WEIGHT: 235 LBS | HEIGHT: 72 IN | BODY MASS INDEX: 31.83 KG/M2 | SYSTOLIC BLOOD PRESSURE: 140 MMHG | HEART RATE: 80 BPM | DIASTOLIC BLOOD PRESSURE: 78 MMHG

## 2024-08-28 VITALS — WEIGHT: 230 LBS | HEIGHT: 72 IN | RESPIRATION RATE: 20 BRPM | BODY MASS INDEX: 31.15 KG/M2

## 2024-08-28 DIAGNOSIS — R20.0 ARM NUMBNESS: Primary | ICD-10-CM

## 2024-08-28 DIAGNOSIS — M96.1 LUMBAR POSTLAMINECTOMY SYNDROME: Primary | ICD-10-CM

## 2024-08-28 DIAGNOSIS — R20.0 ARM NUMBNESS: ICD-10-CM

## 2024-08-28 DIAGNOSIS — M79.18 MYOFASCIAL PAIN SYNDROME: ICD-10-CM

## 2024-08-28 DIAGNOSIS — M54.16 LUMBAR RADICULOPATHY: ICD-10-CM

## 2024-08-28 PROCEDURE — 99212 OFFICE O/P EST SF 10 MIN: CPT | Performed by: STUDENT IN AN ORGANIZED HEALTH CARE EDUCATION/TRAINING PROGRAM

## 2024-08-28 PROCEDURE — 72141 MRI NECK SPINE W/O DYE: CPT

## 2024-08-28 PROCEDURE — 99214 OFFICE O/P EST MOD 30 MIN: CPT | Performed by: PHYSICIAN ASSISTANT

## 2024-08-28 NOTE — PROGRESS NOTES
Virtual Regular Visit  Name: Tony Mtz      : 1975      MRN: [id number]  Encounter Provider: Adan Moore MD  Encounter Date: 2024   Encounter department: [facility]    Verification of patient location:    Patient is located at Home in the following state in which I hold an active license [place]    Assessment & Plan   Tony Mtz is a 48 y.o. male who presents with a history of left-sided L3 radiculopathy and a left-sided L2-3 herniated disc who is status post left L2-3 microdiscectomy on 3/12/2024.  He developed the return of left knee pain approximately 2 months ago.  The pain is largely just in his left knee and does not seem to radiate.  His x-ray left knee did not show any acute findings.  His previous MRI showed good decompression of the left herniated disc at L2-3.  He has a referral to pain management for an ALAN.  I recommended ALAN and physical therapy.  If the ALAN fails then I recommended him discussing with pain management for possible spinal cord stimulator trial.  During the visit he endorsed that he has a flareup of neck pain and right arm numbness.  The numbness involves the entirety of his right arm.  This is a chronic problem that he has had for many years and it flares up from time to time but it is particularly bad currently.  He has not had any MRI cervical imaging.  I ordered an MRI cervical for further evaluation for this.  Return to clinic after MRI cervical completed to discuss results.  I did discuss with him that unfortunately I will be leaving the practice at the end of September due to personal family issues and moving closer to home.  If his MRI cervical showed any findings that would need surgery then I discussed with him that I would refer him to my partner for further treatment.    Encounter provider Adan Moore MD    The patient was identified by name and date of birth. Tony Mtz was informed that this is a telemedicine  visit and that the visit is being conducted through the Epic Embedded platform. He agrees to proceed..  My office door was closed. No one else was in the room.  He acknowledged consent and understanding of privacy and security of the video platform. The patient has agreed to participate and understands they can discontinue the visit at any time.    Patient is aware this is a billable service.     History of Present Illness     Tony Mtz is a 48 y.o. male who presents with a history of left-sided L3 radiculopathy and a left-sided L2-3 herniated disc who is status post left L2-3 microdiscectomy on 3/12/2024.  He developed the return of left knee pain approximately 2 months ago.  The pain largely is slowly in the knee and does not feel like it radiates from his back to his knee.  He presents to clinic to discuss results of his left knee x-ray.  Overall the x-ray did not show any effusion, lesions, or fractures.  He continues to endorse left knee pain.    Review of Systems    Objective     Resp 20   Ht 6' (1.829 m)   Wt 104 kg (230 lb)   BMI 31.19 kg/m²   Physical Exam  A&OX3  Gaze conjugate  EOMI  FS  TM  Fcx4    Visit Time  Total Visit Duration: 15 mins

## 2024-08-28 NOTE — PROGRESS NOTES
Assessment:  1. Lumbar postlaminectomy syndrome    2. Lumbar radiculopathy    3. Myofascial pain syndrome        Plan:  While the patient was in the office today, I did have a thorough conversation regarding their chronic pain syndrome, medication management, and treatment plan options.    After discussing options, we will get the patient scheduled for a left L3 and L4 transforaminal epidural steroid injection to address the persistent radicular component of his pain pattern.    The spinal cord stimulator process was discussed in full detail with the patient on today's visit.  At this point he is not entirely sure the amount of pain he is experiencing warrants going through this.    I have prescribed tizanidine 4 mg nightly.  I advised the patient that they should not drive or operate machinery while on this medication until they see how it affects them, as it could cause lethargy and mental cloudiness. I advised the patient to call our office if they experience any side effects or issues with the medication changes. The patient verbalized an understanding.    The patient will follow-up with Dr. Moore as scheduled. The patient was advised to contact the office should their symptoms worsen in the interim. The patient was agreeable and verbalized an understanding.        History of Present Illness:    The patient is a 48 y.o. male last seen on 2/5/2024 who presents for a follow up office visit in regards to chronic radicular low back pain secondary to lumbar postlaminectomy pain syndrome, lumbar spondylosis and degenerative disc disease.  The patient currently reports persistent pain in the left knee area that he rates a 2 out of 10 and describes it as an intermittent sharp, throbbing and shooting pain.  He states there are no particular aggravating or alleviating factors.  Since we last saw the patient he underwent a left L2-3 microdiscectomy which he states alleviated this thigh pain but not the knee pain.  He was  "recently reevaluated by Dr. Moore who has recommended an L3-4 transforaminal epidural steroid injection.  Spinal cord stimulation was also mentioned.    I have personally reviewed and/or updated the patient's past medical history, past surgical history, family history, social history, current medications, allergies, and vital signs today.       Review of Systems:    Review of Systems   Respiratory:  Negative for shortness of breath.    Cardiovascular:  Negative for chest pain.   Gastrointestinal:  Negative for constipation, diarrhea, nausea and vomiting.   Musculoskeletal:  Positive for gait problem. Negative for arthralgias, joint swelling and myalgias.   Skin:  Negative for rash.   Neurological:  Negative for dizziness, seizures and weakness.   All other systems reviewed and are negative.        Past Medical History:   Diagnosis Date   • Anesthesia     \"woke up during procedure\"   • Anxiety    • Depression    • Elevated hemoglobin A1c    • Obstructive sleep apnea 06/13/2023    has CPAP but cannot use       Past Surgical History:   Procedure Laterality Date   • EYE SURGERY      lasik   • FOOT SURGERY Bilateral     hammer toe   • FOREARM SURGERY Right    • HAND SURGERY Left    • MA LAMNOTMY INCL W/DCMPRSN NRV ROOT 1 INTRSPC LUMBR Left 3/12/2024    Procedure: LEFT L2-3 MIS MICRODISCECTOMY;  Surgeon: Adan Moore MD;  Location:  MAIN OR;  Service: Neurosurgery   • MA NEUROPLASTY &/TRANSPOS MEDIAN NRV CARPAL TUNNE Left 08/20/2019    Procedure: CARPAL TUNNEL RELEASE;  Surgeon: Rafaela Castillo MD;  Location: [facility]  MAIN OR;  Service: Orthopedics   • MA NEUROPLASTY &/TRANSPOS MEDIAN NRV CARPAL TUNNE Right 12/04/2020    Procedure: RELEASE CARPAL TUNNEL;  Surgeon: Rafaela Castillo MD;  Location: [facility]IN OR;  Service: Orthopedics   • MA SURGICAL ARTHROSCOPY SHOULDER W/ROTATOR CUFF RPR Left 02/27/2023    Procedure: ARTHROSCOPY SHOULDER- left, rotator cuff repair, biceps tenodesis and all necessary procedures;  Surgeon: " Estefani Silva;  Location:  MAIN OR;  Service: Orthopedics   • SHOULDER SURGERY Right     rotator cuff x 2       Family History   Problem Relation Age of Onset   • Diabetes Father    • Diabetes Paternal Grandmother        Social History     Occupational History   • Not on file   Tobacco Use   • Smoking status: Every Day     Current packs/day: 1.00     Average packs/day: 1 pack/day for 35.7 years (35.7 ttl pk-yrs)     Types: Cigarettes     Start date: 1989   • Smokeless tobacco: Never   Vaping Use   • Vaping status: Never Used   Substance and Sexual Activity   • Alcohol use: Yes     Comment: occasional   • Drug use: Never   • Sexual activity: Yes         Current Outpatient Medications:   •  buPROPion (Wellbutrin SR) 150 mg 12 hr tablet, Take 150 mg by mouth 2 (two) times a day, Disp: , Rfl:   •  oxyCODONE-acetaminophen (Percocet) 5-325 mg per tablet, Take 1-2 tablets by mouth every 6 (six) hours as needed for severe pain Max Daily Amount: 8 tablets, Disp: 40 tablet, Rfl: 0  •  tiZANidine (ZANAFLEX) 4 mg tablet, Take 1 tablet (4 mg total) by mouth daily at bedtime, Disp: 30 tablet, Rfl: 2    Allergies   Allergen Reactions   • Adhesive [Medical Tape] Hives       Physical Exam:    /78 (BP Location: Left arm, Patient Position: Sitting, Cuff Size: Standard)   Pulse 80   Temp 98.7 °F (37.1 °C)   Ht 6' (1.829 m)   Wt 107 kg (235 lb)   BMI 31.87 kg/m²     Constitutional:normal, well developed, well nourished, alert, in no distress and non-toxic and no overt pain behavior. and overweight  Eyes:anicteric  HEENT:grossly intact  Neck:supple, symmetric, trachea midline and no masses   Pulmonary:even and unlabored  Cardiovascular:No edema or pitting edema present  Skin:Normal without rashes or lesions and well hydrated  Psychiatric:Mood and affect appropriate  Neurologic:Cranial Nerves II-XII grossly intact  Musculoskeletal: Gait is stable without the use of assistive devices      Imaging  FL spine and pain procedure     (Results Pending)         Orders Placed This Encounter   Procedures   • FL spine and pain procedure

## 2024-08-29 NOTE — PROGRESS NOTES
Virtual Regular Visit  Name: Tony Mtz      : 1975      MRN: [id number]  Encounter Provider: Adan Moore MD  Encounter Date: 2024   Encounter department: [facility]    Verification of patient location:    Patient is located at Home in the following state in which I hold an active license [place]    Assessment & Plan   Tony Mtz is a 48 y.o. male who presents with a history of left-sided L3 radiculopathy and a left-sided L2-3 herniated disc who is status post left L2-3 microdiscectomy on 3/12/2024.  He developed the return of left knee pain approximately 2 months ago.  The pain is largely just in his left knee and does not seem to radiate.  His x-ray left knee did not show any acute findings.  His previous MRI showed good decompression of the left herniated disc at L2-3.  He has a referral to pain management for an ALAN.  I recommended ALAN and physical therapy.  If the ALAN fails then I recommended him discussing with pain management for possible spinal cord stimulator trial.  During the visit he endorsed that he has a flareup of neck pain and right arm numbness.  The numbness involves the entirety of his right arm.  This is a chronic problem that he has had for many years and it flares up from time to time but it is particularly bad currently.  He has not had any MRI cervical imaging.  I ordered an MRI cervical for further evaluation for this.  Return to clinic after MRI cervical completed to discuss results.  I did discuss with him that unfortunately I will be leaving the practice at the end of September due to personal family issues and moving closer to home.  If his MRI cervical showed any findings that would need surgery then I discussed with him that I would refer him to my partner for further treatment.    Encounter provider Adan Moore MD    The patient was identified by name and date of birth. Tony Mtz was informed that this is a telemedicine  visit and that the visit is being conducted through the Epic Embedded platform. He agrees to proceed..  My office door was closed. No one else was in the room.  He acknowledged consent and understanding of privacy and security of the video platform. The patient has agreed to participate and understands they can discontinue the visit at any time.    Patient is aware this is a billable service.     History of Present Illness     Tony Mtz is a 48 y.o. male who presents with a history of left-sided L3 radiculopathy and a left-sided L2-3 herniated disc who is status post left L2-3 microdiscectomy on 3/12/2024.  He developed the return of left knee pain approximately 2 months ago.  The pain largely is slowly in the knee and does not feel like it radiates from his back to his knee.  He presents to clinic to discuss results of his left knee x-ray.  Overall the x-ray did not show any effusion, lesions, or fractures.  He continues to endorse left knee pain.    Review of Systems    Objective     Resp 20   Ht 6' (1.829 m)   Wt 104 kg (230 lb)   BMI 31.19 kg/m²   Physical Exam  A&OX3  Gaze conjugate  EOMI  FS  TM  Fcx4    Visit Time  Total Visit Duration: 15 mins

## 2024-09-11 ENCOUNTER — TELEMEDICINE (OUTPATIENT)
Age: 49
End: 2024-09-11
Payer: COMMERCIAL

## 2024-09-11 VITALS — HEIGHT: 72 IN | WEIGHT: 235 LBS | BODY MASS INDEX: 31.83 KG/M2

## 2024-09-11 DIAGNOSIS — M54.12 RADICULOPATHY, CERVICAL: Primary | ICD-10-CM

## 2024-09-11 PROCEDURE — 99212 OFFICE O/P EST SF 10 MIN: CPT | Performed by: STUDENT IN AN ORGANIZED HEALTH CARE EDUCATION/TRAINING PROGRAM

## 2024-09-11 NOTE — PROGRESS NOTES
Virtual Regular Visit  Name: Tony Mtz      : 1975      MRN: [id number]  Encounter Provider: Adan Moore MD  Encounter Date: 2024   Encounter department: [facility]    Verification of patient location:    Patient is located at Home in the following state in which I hold an active license [place]    Assessment & Plan    Tony Mtz is a 48 y.o. male who presents with a history of left-sided L3 radiculopathy and a left-sided L2-3 herniated disc who is status post left L2-3 microdiscectomy on 3/12/2024. He developed the return of left knee pain approximately 2 months ago. The pain is largely just in his left knee and does not seem to radiate. His x-ray left knee did not show any acute findings. His previous MRI showed good decompression of the left herniated disc at L2-3. He has a referral to pain management for an ALAN. I recommended ALAN and physical therapy. If the ALAN fails then I recommended him discussing with pain management for possible spinal cord stimulator trial.  He presents to clinic today to discuss results of MRI cervical spine that was performed for worsening neck pain and right arm pain and numbness that has been flaring up over the last 6 to 8 weeks.  I reviewed the MRI cervical spine with him.  Overall it shows a C5-6 herniated disc that is worse on the left side and abutting the spinal cord.  No cord signal change at this level.  I discussed with him that he does have a disc bulge on the right as well at this level and right-sided C5-6 foraminal stenosis however it is odd that all of his symptoms are only right-sided and does not involve the left at all because it is much more severe on the left.  I also discussed with him that the location of his pain on the right is more consistent with a C7 radiculopathy but his disc herniation is at C5-6.  I ordered EMG and x-ray cervical flex ex for further evaluation.  I also gave him a prescription for  physical therapy as well as referral to pain management for a cervical epidural steroid injection.  Furthermore, I discussed with him that unfortunately I will be leaving the practice at the end of September due to personal family issues and having to move closer to family, and as his MRI cervical spine showed an abnormality at C5-6 I could potentially require surgery, I would have to refer him for a follow-up to my partner in order to discuss potential surgical options further with him.      Encounter provider Adan Moore MD    The patient was identified by name and date of birth. Tony Mtz was informed that this is a telemedicine visit and that the visit is being conducted through the Epic Embedded platform. He agrees to proceed..  My office door was closed. No one else was in the room.  He acknowledged consent and understanding of privacy and security of the video platform. The patient has agreed to participate and understands they can discontinue the visit at any time.    Patient is aware this is a billable service.     History of Present Illness     Tony Mtz is a 48 y.o. male who presents with a history of left-sided L3 radiculopathy and a left-sided L2-3 herniated disc who is status post left L2-3 microdiscectomy on 3/12/2024. He developed the return of left knee pain approximately 2 months ago. The pain is largely just in his left knee and does not seem to radiate. His x-ray left knee did not show any acute findings. His previous MRI showed good decompression of the left herniated disc at L2-3. He has a referral to pain management for an ALAN. I recommended ALAN and physical therapy. If the ALAN fails then I recommended him discussing with pain management for possible spinal cord stimulator trial.  He presents to clinic today to discuss results of MRI cervical spine that was performed for worsening neck pain and right arm pain and numbness that has been flaring up over the last 6 to 8 weeks.  He  has a chronic history of neck pain and right arm pain over the last 4 to 5 years.  The pain radiates in the right upper extremity into the hand and involves all 5 fingers but seems to involve the second and fourth digits primarily.      Review of Systems        Objective     There were no vitals taken for this visit.  Physical Exam  A&OX3  Gaze conjugate  EOMI  FS  TM  Fcx4    Visit Time  Total Visit Duration: 15 mins

## 2024-09-13 ENCOUNTER — TELEPHONE (OUTPATIENT)
Age: 49
End: 2024-09-13

## 2024-09-23 ENCOUNTER — APPOINTMENT (OUTPATIENT)
Dept: RADIOLOGY | Age: 49
End: 2024-09-23
Payer: COMMERCIAL

## 2024-09-23 ENCOUNTER — OFFICE VISIT (OUTPATIENT)
Dept: OBGYN CLINIC | Facility: CLINIC | Age: 49
End: 2024-09-23
Payer: OTHER MISCELLANEOUS

## 2024-09-23 VITALS
SYSTOLIC BLOOD PRESSURE: 135 MMHG | BODY MASS INDEX: 31.97 KG/M2 | DIASTOLIC BLOOD PRESSURE: 82 MMHG | HEART RATE: 60 BPM | WEIGHT: 236 LBS | HEIGHT: 72 IN

## 2024-09-23 DIAGNOSIS — M79.601 RIGHT ARM PAIN: ICD-10-CM

## 2024-09-23 DIAGNOSIS — M25.521 PAIN IN RIGHT ELBOW: Primary | ICD-10-CM

## 2024-09-23 PROCEDURE — 73080 X-RAY EXAM OF ELBOW: CPT

## 2024-09-23 PROCEDURE — 99214 OFFICE O/P EST MOD 30 MIN: CPT | Performed by: ORTHOPAEDIC SURGERY

## 2024-09-23 PROCEDURE — 73030 X-RAY EXAM OF SHOULDER: CPT
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
## 2024-09-30 ENCOUNTER — TELEPHONE (OUTPATIENT)
Age: 49
End: 2024-09-30

## 2024-09-30 NOTE — TELEPHONE ENCOUNTER
9/30/24 CALLED PT AND LMOM FOR HIM TO CB TO OFFER TO RESCHEDULE CANCELLED APPT WITH DR SIMMONS, PT WILL NEED XRAYS PRIOR.     Appointment canceled for Tony Mtz ([id number])   Visit type: OFFICE VISIT LONG [facility]   10/29/2024 12:30 PM (45 minutes) with Jose Mays MD in [facility] [place]      Reason for cancellation: Canceled via MyChart

## 2024-10-26 ENCOUNTER — OFFICE VISIT (OUTPATIENT)
Dept: URGENT CARE | Facility: CLINIC | Age: 49
End: 2024-10-26
Payer: COMMERCIAL

## 2024-10-26 VITALS
SYSTOLIC BLOOD PRESSURE: 110 MMHG | OXYGEN SATURATION: 98 % | TEMPERATURE: 97 F | RESPIRATION RATE: 18 BRPM | DIASTOLIC BLOOD PRESSURE: 70 MMHG | HEART RATE: 74 BPM

## 2024-10-26 DIAGNOSIS — L03.012 PARONYCHIA OF LEFT MIDDLE FINGER: Primary | ICD-10-CM

## 2024-10-26 PROCEDURE — 99213 OFFICE O/P EST LOW 20 MIN: CPT

## 2024-10-26 RX ORDER — BUPROPION HYDROCHLORIDE 150 MG/1
450 TABLET ORAL DAILY
COMMUNITY
Start: 2024-08-26

## 2024-10-26 RX ORDER — SULFAMETHOXAZOLE AND TRIMETHOPRIM 800; 160 MG/1; MG/1
1 TABLET ORAL EVERY 12 HOURS SCHEDULED
Qty: 10 TABLET | Refills: 0 | Status: SHIPPED | OUTPATIENT
Start: 2024-10-26 | End: 2024-10-31

## 2024-10-26 NOTE — PATIENT INSTRUCTIONS
Take Bactrim as prescribed.    Start warm water and Epsom salt soaks.    Complete the entire course of antibiotics - even if you are feeling better.     Return to [facility] or go to ER for worsening redness, red line streaking, warmth, swelling, pain, pus drainage, or if you develop fevers/chills.    Follow up with your PCP in 3-5 days.

## 2024-10-26 NOTE — PROGRESS NOTES
[facility]        NAME: Tony Mtz is a 49 y.o. male  : 1975    MRN: [id number]  DATE: 2024  TIME: 9:03 AM    Assessment and Plan   Paronychia of left middle finger [L03.012]  1. Paronychia of left middle finger  sulfamethoxazole-trimethoprim (BACTRIM DS) 800-160 mg per tablet            Patient Instructions     Take Bactrim as prescribed.    Start warm water and Epsom salt soaks.    Complete the entire course of antibiotics - even if you are feeling better.     Return to [facility] or go to ER for worsening redness, red line streaking, warmth, swelling, pain, pus drainage, or if you develop fevers/chills.    Follow up with your PCP in 3-5 days.    If tests are performed, our office will contact you with results only if changes need to made to the care plan discussed with you at the visit. [facility]      Chief Complaint     Chief Complaint   Patient presents with    Left Middle Finger Pain and Swelling         History of Present Illness       49-year-old male who presents for evaluation of left middle finger pain and swelling x 4 days. Noticed redness yesterday. It is occasionally numb and tingly. Notes slightly limited flexion. Denies fevers. No prior history of skin infections or abscesses. No PMH T2DM.        Review of Systems   Review of Systems   Constitutional:  Negative for chills and fever.   Respiratory:  Negative for shortness of breath.    Cardiovascular:  Negative for chest pain.   Gastrointestinal:  Negative for abdominal pain.   Skin:  Positive for color change (redness, left middle finger).   Neurological:  Negative for dizziness and headaches.         Current Medications       Current Outpatient Medications:     buPROPion (WELLBUTRIN XL) 150 mg 24 hr tablet, Take 450 mg by mouth daily, Disp: , Rfl:     sulfamethoxazole-trimethoprim (BACTRIM DS) 800-160 mg per tablet, Take 1 tablet by mouth every 12 (twelve) hours for 5  incisional hernia "days, Disp: 10 tablet, Rfl: 0    tiZANidine (ZANAFLEX) 4 mg tablet, Take 1 tablet (4 mg total) by mouth daily at bedtime, Disp: 30 tablet, Rfl: 2    buPROPion (Wellbutrin SR) 150 mg 12 hr tablet, Take 150 mg by mouth 2 (two) times a day (Patient not taking: Reported on 10/26/2024), Disp: , Rfl:     oxyCODONE-acetaminophen (Percocet) 5-325 mg per tablet, Take 1-2 tablets by mouth every 6 (six) hours as needed for severe pain Max Daily Amount: 8 tablets (Patient not taking: Reported on 9/23/2024), Disp: 40 tablet, Rfl: 0    Current Allergies     Allergies as of 10/26/2024 - Reviewed 10/26/2024   Allergen Reaction Noted    Adhesive [medical tape] Hives 06/04/2018            The following portions of the patient's history were reviewed and updated as appropriate: allergies, current medications, past family history, past medical history, past social history, past surgical history and problem list.     Past Medical History:   Diagnosis Date    Anesthesia     \"woke up during procedure\"    Anxiety     Depression     Elevated hemoglobin A1c     Obstructive sleep apnea 06/13/2023    has CPAP but cannot use       Past Surgical History:   Procedure Laterality Date    EYE SURGERY      lasik    FOOT SURGERY Bilateral     hammer toe    FOREARM SURGERY Right     HAND SURGERY Left     MO LAMNOTMY INCL W/DCMPRSN NRV ROOT 1 INTRSPC LUMBR Left 3/12/2024    Procedure: LEFT L2-3 MIS MICRODISCECTOMY;  Surgeon: Adan Moore MD;  Location:  MAIN OR;  Service: Neurosurgery    MO NEUROPLASTY &/TRANSPOS MEDIAN NRV CARPAL TUNNE Left 08/20/2019    Procedure: CARPAL TUNNEL RELEASE;  Surgeon: Rafaela Castillo MD;  [facility];  Service: Orthopedics    MO NEUROPLASTY &/TRANSPOS MEDIAN NRV CARPAL TUNNE Right 12/04/2020    Procedure: RELEASE CARPAL TUNNEL;  Surgeon: Rafaela Castillo MD;  [facility];  Service: Orthopedics    MO SURGICAL ARTHROSCOPY SHOULDER W/ROTATOR CUFF RPR Left 02/27/2023    Procedure: ARTHROSCOPY " SHOULDER- left, rotator cuff repair, biceps tenodesis and all necessary procedures;  Surgeon: Estefani Silva;  Location:  MAIN OR;  Service: Orthopedics    SHOULDER SURGERY Right     rotator cuff x 2       Family History   Problem Relation Age of Onset    Diabetes Father     Diabetes Paternal Grandmother          Medications have been verified.        Objective   /70   Pulse 74   Temp (!) 97 °F (36.1 °C)   Resp 18   SpO2 98%        Physical Exam     Physical Exam  Vitals and nursing note reviewed.   Constitutional:       General: He is not in acute distress.     Appearance: He is not ill-appearing.   HENT:      Head: Normocephalic and atraumatic.      Mouth/Throat:      Mouth: Mucous membranes are moist.   Cardiovascular:      Rate and Rhythm: Normal rate.   Pulmonary:      Effort: Pulmonary effort is normal.   Musculoskeletal:         General: Normal range of motion.      Cervical back: Normal range of motion and neck supple.   Skin:     General: Skin is warm and dry.      Capillary Refill: Capillary refill takes less than 2 seconds.      Findings: Erythema present.      Comments: Redness, swelling, and warmth to area of left 3rd distal phalanx, exam consistent with paronychia. +TTP. Slightly limited flexion at DIP joint. Sensation intact. Cap refill 2 sec.   Neurological:      Mental Status: He is alert and oriented to person, place, and time.

## 2025-02-07 ENCOUNTER — OFFICE VISIT (OUTPATIENT)
Dept: PODIATRY | Facility: CLINIC | Age: 50
End: 2025-02-07
Payer: COMMERCIAL

## 2025-02-07 VITALS — HEIGHT: 72 IN | BODY MASS INDEX: 31.15 KG/M2 | WEIGHT: 230 LBS

## 2025-02-07 DIAGNOSIS — M21.961 METATARSAL DEFORMITY, RIGHT: ICD-10-CM

## 2025-02-07 DIAGNOSIS — L85.1 PLANTAR POROKERATOSIS, ACQUIRED: Primary | ICD-10-CM

## 2025-02-07 DIAGNOSIS — M20.5X2 ACQUIRED CLAW TOE OF BOTH FEET: ICD-10-CM

## 2025-02-07 DIAGNOSIS — M20.5X1 ACQUIRED CLAW TOE OF BOTH FEET: ICD-10-CM

## 2025-02-07 DIAGNOSIS — M77.41 METATARSALGIA, RIGHT FOOT: ICD-10-CM

## 2025-02-07 PROCEDURE — 99213 OFFICE O/P EST LOW 20 MIN: CPT | Performed by: PODIATRIST

## 2025-02-08 NOTE — PROGRESS NOTES
Assessment/Plan:   Treatment options for painful lesions and clinical deformity noted especially on the right foot discussed.  Clinically plantarflexed second metatarsal contributes to the formation of the painful lesion subsecond MPJ on the right foot.  Also present are contracted digits 2 and 3 bilateral with the right being a little more severe.  Claw toe contracture on the right contributes to plantarflexion of the second metatarsal head.  Dispensed adhesive felt padding to offload this area, palliative care of hyperkeratotic lesions on an as-needed basis recommended.  Also should consider requiring better athletic type shoe gear with more robust forefoot padding.  Patient should bring old orthotics to next visit so they can be evaluated for possible refurbishment and incorporating a forefoot offloading pad system if possible.  Follow-up on an as-needed basis depending on the recurrence rate of the lesions.     Diagnoses and all orders for this visit:    Plantar porokeratosis, acquired  Comments:  Right foot subsecond MPJ    Acquired claw toe of both feet  Comments:  Second and third toes bilateral    Metatarsal deformity, right  Comments:  Second metatarsal    Metatarsalgia, right foot  Comments:  Second MPJ          Subjective:     Patient ID: Tony Mtz is a 49 y.o. male.    2/7/2025: 49-year-old male presents today concerned with pain in both feet right being more significant than the left.  Pain is localized to the second toe joint plantarly with a callus lesion present.  Patient reports having orthotics but the materials  and started to fall apart so he is no longer wearing them.  Patient reports heel pain that he was previously seeing Dr. Tellez for seems to have resolved.      Review of Systems   Constitutional: Negative.    HENT: Negative.     Eyes: Negative.    Respiratory: Negative.     Cardiovascular: Negative.    Gastrointestinal: Negative.    Endocrine: Negative.    Genitourinary:  Negative.    Musculoskeletal:         Contracted toes with painful forefoot second toe joints   Skin:         Painful lesion bilateral feet underneath the second toe joint with the right being greater than the left   Allergic/Immunologic: Negative.    Neurological: Negative.    Hematological: Negative.    Psychiatric/Behavioral: Negative.           Objective:     Physical Exam  Constitutional:       Appearance: Normal appearance.   HENT:      Head: Normocephalic.      Right Ear: Tympanic membrane normal.      Left Ear: Tympanic membrane normal.      Nose: No congestion.      Mouth/Throat:      Pharynx: No oropharyngeal exudate or posterior oropharyngeal erythema.   Eyes:      Conjunctiva/sclera: Conjunctivae normal.      Pupils: Pupils are equal, round, and reactive to light.   Cardiovascular:      Rate and Rhythm: Normal rate and regular rhythm.      Pulses:           Dorsalis pedis pulses are 2+ on the right side and 2+ on the left side.        Posterior tibial pulses are 1+ on the right side and 1+ on the left side.   Pulmonary:      Effort: Pulmonary effort is normal.   Musculoskeletal:         General: Tenderness and deformity present.      Right foot: Deformity present.      Left foot: Deformity present.      Comments:   Bilateral second toes are contracted with flexion deformity at the PIP and DIP consistent with a claw toe type deformity.  This deformity is semireducible and slightly more extensive involving the right foot.    Right foot clearly has a clinically palpable plantarflexed second metatarsal prominence.    No further pain with palpation of heels.   Feet:      Right foot:      Protective Sensation: 10 sites tested.  10 sites sensed.      Skin integrity: Callus present.      Left foot:      Protective Sensation: 10 sites tested.  10 sites sensed.      Skin integrity: Callus present.   Skin:     General: Skin is warm and dry.      Capillary Refill: Capillary refill takes 2 to 3 seconds.       Coloration: Skin is not pale.      Findings: Lesion present. No bruising, erythema or rash.      Comments:   Right foot: Subsecond MPJ porokeratotic lesion measuring approximately 1 cm x 0.5 cm overall in size with the porokeratotic nature being about 0.3 cm² in size with a depth of 0.3 cm.    Left foot subsecond MPJ has a more diffuse hyperkeratotic lesion with no porokeratotic nature.  Minimal tenderness with palpation.   Neurological:      Mental Status: He is alert.      Cranial Nerves: No cranial nerve deficit.      Sensory: No sensory deficit.      Motor: No weakness.      Gait: Gait normal.      Deep Tendon Reflexes: Reflexes normal.   Psychiatric:         Mood and Affect: Mood normal.         Behavior: Behavior normal.         Judgment: Judgment normal.

## 2025-02-10 ENCOUNTER — HOSPITAL ENCOUNTER (OUTPATIENT)
Dept: NEUROLOGY | Facility: CLINIC | Age: 50
Discharge: HOME | End: 2025-02-10
Payer: COMMERCIAL

## 2025-02-10 DIAGNOSIS — M54.12 RADICULOPATHY, CERVICAL: ICD-10-CM

## 2025-02-10 PROCEDURE — 95886 MUSC TEST DONE W/N TEST COMP: CPT | Performed by: PSYCHIATRY & NEUROLOGY

## 2025-02-10 PROCEDURE — 95911 NRV CNDJ TEST 9-10 STUDIES: CPT | Performed by: PSYCHIATRY & NEUROLOGY

## 2025-02-11 ENCOUNTER — TELEPHONE (OUTPATIENT)
Age: 50
End: 2025-02-11

## 2025-02-11 NOTE — TELEPHONE ENCOUNTER
02/11/2025- Called pt and left message on machine to reschedule their canceled apt. If pt calls back please offer 02/14 with Dr. Jimenez at 8am or 3pm.

## 2025-02-19 ENCOUNTER — TELEPHONE (OUTPATIENT)
Dept: NEUROSURGERY | Facility: CLINIC | Age: 50
End: 2025-02-19

## 2025-02-19 NOTE — TELEPHONE ENCOUNTER
02/19/2025 CALLED PT TO R/S LVM TO CB **3RD ATTEMPT** SENDING WASN'T ABLE TO REACH YOU LETTER OUT TODAY                **CANCELLED**  Tony Mtz MRN: [id number]    Date: 2/14/2025 Status: Can   Time: 8:00 AM Length: 45   Visit Type: OFFICE VISIT LONG [facility] [[id number]] Copay: $0.00   Provider: Jimmy Jimenez MD Department:  [facility] [place]   Bill Area:  Department Phone: [phone]   Referral Number:   Referral Status:         Auto Confirm Status: Patient Cancelled   Notes: NEEDS XRS LVM WITH REMINDER    LILI FROM [facility] WK F/U W/ X-RAY CERVICAL SPINE

## (undated) DEVICE — SPONGE PVP SCRUB WING STERILE

## (undated) DEVICE — BLADE SHAVER TORPEDO 4MM 13CM  COOLCUT

## (undated) DEVICE — INTENDED FOR TISSUE SEPARATION, AND OTHER PROCEDURES THAT REQUIRE A SHARP SURGICAL BLADE TO PUNCTURE OR CUT.: Brand: BARD-PARKER ® CARBON RIB-BACK BLADES

## (undated) DEVICE — TOOL MR8-SP14MH30T MR8 14CM SP MH 3F 3MM: Brand: MIDAS REX MR8

## (undated) DEVICE — COBAN 4 IN STERILE

## (undated) DEVICE — OCCLUSIVE GAUZE STRIP,3% BISMUTH TRIBROMOPHENATE IN PETROLATUM BLEND: Brand: XEROFORM

## (undated) DEVICE — LIGHT HANDLE COVER SLEEVE DISP BLUE STELLAR

## (undated) DEVICE — CHLORAPREP HI-LITE 26ML ORANGE

## (undated) DEVICE — MINI BLADE ROUND TIP SHARP ON ONE SIDE

## (undated) DEVICE — GLOVE INDICATOR PI UNDERGLOVE SZ 8 BLUE

## (undated) DEVICE — U-DRAPE: Brand: CONVERTORS

## (undated) DEVICE — ACE WRAP 3 IN STERILE

## (undated) DEVICE — 3M™ STERI-STRIP™ REINFORCED ADHESIVE SKIN CLOSURES, R1547, 1/2 IN X 4 IN (12 MM X 100 MM), 6 STRIPS/ENVELOPE: Brand: 3M™ STERI-STRIP™

## (undated) DEVICE — STERILE BETHLEHEM PLASTIC HAND: Brand: CARDINAL HEALTH

## (undated) DEVICE — SUTURETAPE 1.3MM W/CLOSED LOOP BLUE/WHITE AR-7535

## (undated) DEVICE — GLOVE SRG BIOGEL 7

## (undated) DEVICE — DISPOSABLE EQUIPMENT COVER: Brand: SMALL TOWEL DRAPE

## (undated) DEVICE — GLOVE SRG BIOGEL 8

## (undated) DEVICE — NEEDLE SPINAL18G X 3.5 IN QUINCKE

## (undated) DEVICE — CUFF TOURNIQUET 18 X 5.5 IN QUICK CONNECT 2BLA

## (undated) DEVICE — GAUZE SPONGES,16 PLY: Brand: CURITY

## (undated) DEVICE — PADDING CAST 4 IN  COTTON STRL

## (undated) DEVICE — SUT PROLENE 5-0 P-3 18 IN 8698G

## (undated) DEVICE — CUFF TOURNIQUET 18 X 4 IN QUICK CONNECT DISP 1 BLADDER

## (undated) DEVICE — SYRINGE 20ML LL

## (undated) DEVICE — SUT STRATAFIX SPIRAL MONOCRYL PLUS 4-0 PS-2 45CM SXMP1B118

## (undated) DEVICE — DRESSING MEPILEX AG BORDER 4 X 4 IN

## (undated) DEVICE — SUT ETHILON 3-0 PS-1 18 IN 1663G

## (undated) DEVICE — KNIFE LIGHT 10,PK: Brand: KNIFELIGHT

## (undated) DEVICE — PROBE ABLATION  APOLLORF 90 DEG MULTI PORT

## (undated) DEVICE — SUTURETAPE 1.7MM W/CLOSED LOOP WHITE/BLUE AR-7538

## (undated) DEVICE — ADHESIVE SKIN HIGH VISCOSITY EXOFIN 1ML

## (undated) DEVICE — SNAP KOVER: Brand: UNBRANDED

## (undated) DEVICE — GLOVE INDICATOR PI UNDERGLOVE SZ 7.5 BLUE

## (undated) DEVICE — GROUNDING PAD UNIVERSAL SLW

## (undated) DEVICE — DRESSING MEPILEX AG BORDER POST-OP 4 X 8 IN

## (undated) DEVICE — SUT MONOCRYL 3-0 PS-2 27 IN Y427H

## (undated) DEVICE — SUT PROLENE 4-0 PS-2 18 IN 8682G

## (undated) DEVICE — ANTIBACTERIAL VIOLET BRAIDED (POLYGLACTIN 910), SYNTHETIC ABSORBABLE SUTURE: Brand: COATED VICRYL

## (undated) DEVICE — NEPTUNE E-SEP SMOKE EVACUATION PENCIL, COATED, 70MM BLADE, PUSH BUTTON SWITCH: Brand: NEPTUNE E-SEP

## (undated) DEVICE — MAYO STAND COVER: Brand: CONVERTORS

## (undated) DEVICE — TUBING SUCTION 5MM X 12 FT

## (undated) DEVICE — INTENDED FOR TISSUE SEPARATION, AND OTHER PROCEDURES THAT REQUIRE A SHARP SURGICAL BLADE TO PUNCTURE OR CUT.: Brand: BARD-PARKER SAFETY BLADES SIZE 10, STERILE

## (undated) DEVICE — IMMOBILIZER SHOULDER UNIVERAL

## (undated) DEVICE — PACK MAJOR ORTHO W/SPLITS PBDS

## (undated) DEVICE — FIBERTAK ROTATOR CUFF DISPOSABLES KIT

## (undated) DEVICE — GLOVE SRG BIOGEL 6.5

## (undated) DEVICE — CHLORAPREP HI-LITE 10.5ML ORANGE

## (undated) DEVICE — SUT MONOCRYL 2-0 SH 27 IN Y417H

## (undated) DEVICE — CANNULA BUTTON 8 X 30MM PASSPORT

## (undated) DEVICE — NEEDLE 15 INSPINAL TIP 18 GA

## (undated) DEVICE — IMPERVIOUS STOCKINETTE: Brand: DEROYAL

## (undated) DEVICE — STRETCH BANDAGE: Brand: CURITY

## (undated) DEVICE — HEMOSTATIC MATRIX SURGIFLO 8ML W/THROMBIN

## (undated) DEVICE — PENCIL ELECTROSURG E-Z CLEAN -0035H

## (undated) DEVICE — NEEDLE 25G X 1 1/2

## (undated) DEVICE — 10K ARTHROSCOPY INFLOW/OUTFLOW TUBE SET: Brand: 10K

## (undated) DEVICE — IMPLANT SYSTEM, FIBERTAK BUTTON
Type: IMPLANTABLE DEVICE | Site: SHOULDER | Status: NON-FUNCTIONAL
Brand: ARTHREX®

## (undated) DEVICE — ELECTRODE BLADE MOD  E-Z CLEAN 6.5IN -0014M

## (undated) DEVICE — Device

## (undated) DEVICE — GLOVE INDICATOR PI UNDERGLOVE SZ 6.5 BLUE

## (undated) DEVICE — GLOVE INDICATOR PI UNDERGLOVE SZ 7 BLUE

## (undated) DEVICE — NEURO PATTIES 1/2 X 1 1/2

## (undated) DEVICE — BETHLEHEM UNIVERSAL SPINE, KIT: Brand: CARDINAL HEALTH

## (undated) DEVICE — DRAPE MICROSCOPE OPMI PENTERO

## (undated) DEVICE — GLOVE SRG BIOGEL 7.5

## (undated) DEVICE — POSITIONER TRIMANO LIMB BEACH CHAIR

## (undated) DEVICE — NEEDLE SUT SCORPION MULTIFIRE

## (undated) DEVICE — TIBURON SPLIT SHEET: Brand: CONVERTORS

## (undated) DEVICE — DRESSING MEPILEX AG BORDER POST-OP 4 X 6 IN

## (undated) DEVICE — ARTHROSCOPY FLOOR MAT